# Patient Record
Sex: MALE | Race: WHITE | Employment: PART TIME | URBAN - METROPOLITAN AREA
[De-identification: names, ages, dates, MRNs, and addresses within clinical notes are randomized per-mention and may not be internally consistent; named-entity substitution may affect disease eponyms.]

---

## 2018-11-30 ENCOUNTER — HOSPITAL ENCOUNTER (OUTPATIENT)
Age: 68
Discharge: HOME | End: 2018-12-02
Payer: COMMERCIAL

## 2018-11-30 PROCEDURE — 88342 IMHCHEM/IMCYTCHM 1ST ANTB: CPT

## 2018-11-30 PROCEDURE — 88305 TISSUE EXAM BY PATHOLOGIST: CPT

## 2018-12-03 ENCOUNTER — HOSPITAL ENCOUNTER (OUTPATIENT)
Age: 68
Discharge: HOME | End: 2018-12-03
Payer: MEDICARE

## 2018-12-03 LAB
BUN BLDV-MCNC: 15 MG/DL (ref 8–23)
CREAT SERPL-MCNC: 0.8 MG/DL (ref 0.7–1.2)
GFR AFRICAN AMERICAN: >60
GFR NON-AFRICAN AMERICAN: >60 ML/MIN/1.73
MONO TEST: NEGATIVE
TSH SERPL DL<=0.05 MIU/L-ACNC: 1.05 UIU/ML (ref 0.27–4.2)

## 2018-12-03 PROCEDURE — 86308 HETEROPHILE ANTIBODY SCREEN: CPT

## 2018-12-03 PROCEDURE — 86618 LYME DISEASE ANTIBODY: CPT

## 2018-12-03 PROCEDURE — 82565 ASSAY OF CREATININE: CPT

## 2018-12-03 PROCEDURE — 36415 COLL VENOUS BLD VENIPUNCTURE: CPT

## 2018-12-03 PROCEDURE — 84520 ASSAY OF UREA NITROGEN: CPT

## 2018-12-03 PROCEDURE — 84443 ASSAY THYROID STIM HORMONE: CPT

## 2018-12-06 LAB — LYME, EIA: 2.32 LIV (ref 0–1.2)

## 2018-12-07 LAB
LYME (B. BURGDORFERI) AB IGG WB: POSITIVE
LYME AB IGM BY WB:: POSITIVE

## 2019-01-11 DIAGNOSIS — A69.20 LYME DISEASE: ICD-10-CM

## 2019-01-11 RX ORDER — HEPARIN SODIUM (PORCINE) LOCK FLUSH IV SOLN 100 UNIT/ML 100 UNIT/ML
300 SOLUTION INTRAVENOUS PRN
Status: CANCELLED | OUTPATIENT
Start: 2019-01-11

## 2019-01-11 RX ORDER — SODIUM CHLORIDE 0.9 % (FLUSH) 0.9 %
10 SYRINGE (ML) INJECTION PRN
Status: CANCELLED | OUTPATIENT
Start: 2019-01-11

## 2019-01-14 ENCOUNTER — HOSPITAL ENCOUNTER (OUTPATIENT)
Dept: INFUSION THERAPY | Age: 69
Setting detail: INFUSION SERIES
Discharge: HOME | End: 2019-01-14
Payer: MEDICARE

## 2019-01-14 VITALS
TEMPERATURE: 98.5 F | SYSTOLIC BLOOD PRESSURE: 144 MMHG | HEART RATE: 75 BPM | RESPIRATION RATE: 16 BRPM | DIASTOLIC BLOOD PRESSURE: 67 MMHG | OXYGEN SATURATION: 99 %

## 2019-01-14 DIAGNOSIS — A69.20 LYME DISEASE: ICD-10-CM

## 2019-01-14 LAB
ALBUMIN SERPL-MCNC: 3.2 G/DL (ref 3.5–5.2)
ALP BLD-CCNC: 88 U/L (ref 40–129)
ALT SERPL-CCNC: 11 U/L (ref 0–40)
ANION GAP SERPL CALCULATED.3IONS-SCNC: 10 MMOL/L (ref 7–16)
AST SERPL-CCNC: 28 U/L (ref 0–39)
BASOPHILS ABSOLUTE: 0 E9/L (ref 0–0.2)
BASOPHILS RELATIVE PERCENT: 0 % (ref 0–2)
BILIRUB SERPL-MCNC: 0.3 MG/DL (ref 0–1.2)
BUN BLDV-MCNC: 16 MG/DL (ref 8–23)
C-REACTIVE PROTEIN: 4.7 MG/DL (ref 0–0.4)
CALCIUM SERPL-MCNC: 9 MG/DL (ref 8.6–10.2)
CHLORIDE BLD-SCNC: 105 MMOL/L (ref 98–107)
CO2: 25 MMOL/L (ref 22–29)
CREAT SERPL-MCNC: 0.7 MG/DL (ref 0.7–1.2)
EOSINOPHILS ABSOLUTE: 0.05 E9/L (ref 0.05–0.5)
EOSINOPHILS RELATIVE PERCENT: 1.4 % (ref 0–6)
GFR AFRICAN AMERICAN: >60
GFR NON-AFRICAN AMERICAN: >60 ML/MIN/1.73
GLUCOSE BLD-MCNC: 99 MG/DL (ref 74–99)
HCT VFR BLD CALC: 28.6 % (ref 37–54)
HEMOGLOBIN: 9.3 G/DL (ref 12.5–16.5)
IMMATURE GRANULOCYTES #: 0.06 E9/L
IMMATURE GRANULOCYTES %: 1.7 % (ref 0–5)
LYMPHOCYTES ABSOLUTE: 1.16 E9/L (ref 1.5–4)
LYMPHOCYTES RELATIVE PERCENT: 33.4 % (ref 20–42)
MCH RBC QN AUTO: 28 PG (ref 26–35)
MCHC RBC AUTO-ENTMCNC: 32.5 % (ref 32–34.5)
MCV RBC AUTO: 86.1 FL (ref 80–99.9)
MONOCYTES ABSOLUTE: 0.15 E9/L (ref 0.1–0.95)
MONOCYTES RELATIVE PERCENT: 4.3 % (ref 2–12)
NEUTROPHILS ABSOLUTE: 2.05 E9/L (ref 1.8–7.3)
NEUTROPHILS RELATIVE PERCENT: 59.2 % (ref 43–80)
PDW BLD-RTO: 15.5 FL (ref 11.5–15)
PLATELET # BLD: 329 E9/L (ref 130–450)
PMV BLD AUTO: 10.3 FL (ref 7–12)
POTASSIUM SERPL-SCNC: 4.4 MMOL/L (ref 3.5–5)
RBC # BLD: 3.32 E12/L (ref 3.8–5.8)
SEDIMENTATION RATE, ERYTHROCYTE: 69 MM/HR (ref 0–15)
SODIUM BLD-SCNC: 140 MMOL/L (ref 132–146)
TOTAL PROTEIN: 6.7 G/DL (ref 6.4–8.3)
WBC # BLD: 3.5 E9/L (ref 4.5–11.5)

## 2019-01-14 PROCEDURE — 36415 COLL VENOUS BLD VENIPUNCTURE: CPT

## 2019-01-14 PROCEDURE — 85025 COMPLETE CBC W/AUTO DIFF WBC: CPT

## 2019-01-14 PROCEDURE — 76937 US GUIDE VASCULAR ACCESS: CPT

## 2019-01-14 PROCEDURE — C1751 CATH, INF, PER/CENT/MIDLINE: HCPCS

## 2019-01-14 PROCEDURE — 2580000003 HC RX 258: Performed by: SPECIALIST

## 2019-01-14 PROCEDURE — 6360000002 HC RX W HCPCS: Performed by: CLINICAL NURSE SPECIALIST

## 2019-01-14 PROCEDURE — 2500000003 HC RX 250 WO HCPCS: Performed by: SPECIALIST

## 2019-01-14 PROCEDURE — 2580000003 HC RX 258: Performed by: CLINICAL NURSE SPECIALIST

## 2019-01-14 PROCEDURE — 96365 THER/PROPH/DIAG IV INF INIT: CPT

## 2019-01-14 PROCEDURE — 80053 COMPREHEN METABOLIC PANEL: CPT

## 2019-01-14 PROCEDURE — 6360000002 HC RX W HCPCS: Performed by: SPECIALIST

## 2019-01-14 PROCEDURE — 36569 INSJ PICC 5 YR+ W/O IMAGING: CPT

## 2019-01-14 PROCEDURE — 86140 C-REACTIVE PROTEIN: CPT

## 2019-01-14 PROCEDURE — 85651 RBC SED RATE NONAUTOMATED: CPT

## 2019-01-14 RX ORDER — HEPARIN SODIUM (PORCINE) LOCK FLUSH IV SOLN 100 UNIT/ML 100 UNIT/ML
300 SOLUTION INTRAVENOUS PRN
Status: CANCELLED | OUTPATIENT
Start: 2019-01-14

## 2019-01-14 RX ORDER — HEPARIN SODIUM (PORCINE) LOCK FLUSH IV SOLN 100 UNIT/ML 100 UNIT/ML
3 SOLUTION INTRAVENOUS PRN
Status: DISCONTINUED | OUTPATIENT
Start: 2019-01-14 | End: 2019-01-15 | Stop reason: HOSPADM

## 2019-01-14 RX ORDER — SODIUM CHLORIDE 0.9 % (FLUSH) 0.9 %
10 SYRINGE (ML) INJECTION PRN
Status: CANCELLED | OUTPATIENT
Start: 2019-01-14

## 2019-01-14 RX ORDER — LIDOCAINE HYDROCHLORIDE 10 MG/ML
5 INJECTION, SOLUTION EPIDURAL; INFILTRATION; INTRACAUDAL; PERINEURAL ONCE
Status: COMPLETED | OUTPATIENT
Start: 2019-01-14 | End: 2019-01-14

## 2019-01-14 RX ORDER — HEPARIN SODIUM (PORCINE) LOCK FLUSH IV SOLN 100 UNIT/ML 100 UNIT/ML
300 SOLUTION INTRAVENOUS PRN
Status: DISCONTINUED | OUTPATIENT
Start: 2019-01-14 | End: 2019-01-15 | Stop reason: HOSPADM

## 2019-01-14 RX ORDER — LISINOPRIL AND HYDROCHLOROTHIAZIDE 20; 12.5 MG/1; MG/1
1 TABLET ORAL DAILY
COMMUNITY
End: 2021-10-26 | Stop reason: ALTCHOICE

## 2019-01-14 RX ORDER — ONDANSETRON 4 MG/1
4 TABLET, ORALLY DISINTEGRATING ORAL EVERY 8 HOURS PRN
COMMUNITY
End: 2021-10-26 | Stop reason: ALTCHOICE

## 2019-01-14 RX ORDER — SODIUM CHLORIDE 0.9 % (FLUSH) 0.9 %
10 SYRINGE (ML) INJECTION PRN
Status: DISCONTINUED | OUTPATIENT
Start: 2019-01-14 | End: 2019-01-15 | Stop reason: HOSPADM

## 2019-01-14 RX ORDER — HEPARIN SODIUM (PORCINE) LOCK FLUSH IV SOLN 100 UNIT/ML 100 UNIT/ML
3 SOLUTION INTRAVENOUS EVERY 12 HOURS SCHEDULED
Status: DISCONTINUED | OUTPATIENT
Start: 2019-01-14 | End: 2019-01-15 | Stop reason: HOSPADM

## 2019-01-14 RX ADMIN — Medication 10 ML: at 10:56

## 2019-01-14 RX ADMIN — LIDOCAINE HYDROCHLORIDE 2 ML: 10 INJECTION, SOLUTION EPIDURAL; INFILTRATION; INTRACAUDAL; PERINEURAL at 10:33

## 2019-01-14 RX ADMIN — Medication 10 ML: at 12:02

## 2019-01-14 RX ADMIN — CEFTRIAXONE SODIUM 2 G: 2 INJECTION, POWDER, FOR SOLUTION INTRAMUSCULAR; INTRAVENOUS at 11:30

## 2019-01-14 RX ADMIN — Medication 10 ML: at 12:01

## 2019-01-14 RX ADMIN — Medication 300 UNITS: at 12:03

## 2019-01-14 ASSESSMENT — PAIN DESCRIPTION - ORIENTATION: ORIENTATION: RIGHT;LEFT

## 2019-01-14 ASSESSMENT — PAIN DESCRIPTION - DESCRIPTORS: DESCRIPTORS: ACHING;DISCOMFORT

## 2019-01-14 ASSESSMENT — PAIN DESCRIPTION - PAIN TYPE: TYPE: CHRONIC PAIN

## 2019-01-14 ASSESSMENT — PAIN DESCRIPTION - LOCATION: LOCATION: GENERALIZED

## 2019-01-14 NOTE — PROCEDURES
PICC   Catheter insertion date 1/14/2019     Product Number:  [id number]   Lot No: [id number]   Gauge: 4FR   Lumen: SINGLE   R Basilic    Vein Diameter : 0.37CM   Upper arm circumference (10CM ABOVE AC): 25CM   Catheter Length : 46CM   Internal Length: 44CM   Exposed Catheter Length: 2CM   Ultrasound Used:YES  VPS Blue Bullseye confirms PICC tip is placed in the lower 1/3 of the SVC or at the Cavoatrial junction. Infusion Center nurse notified PICC  is okay to use.    : MARIMAR SYLVESTER RN

## 2019-01-15 ENCOUNTER — HOSPITAL ENCOUNTER (OUTPATIENT)
Dept: INFUSION THERAPY | Age: 69
Setting detail: INFUSION SERIES
Discharge: HOME | End: 2019-01-15
Payer: MEDICARE

## 2019-01-15 VITALS
TEMPERATURE: 98 F | DIASTOLIC BLOOD PRESSURE: 56 MMHG | RESPIRATION RATE: 16 BRPM | HEART RATE: 88 BPM | SYSTOLIC BLOOD PRESSURE: 117 MMHG | HEIGHT: 70 IN | OXYGEN SATURATION: 98 % | WEIGHT: 145 LBS | BODY MASS INDEX: 20.76 KG/M2

## 2019-01-15 DIAGNOSIS — A69.20 LYME DISEASE: ICD-10-CM

## 2019-01-15 PROCEDURE — 6360000002 HC RX W HCPCS: Performed by: CLINICAL NURSE SPECIALIST

## 2019-01-15 PROCEDURE — 2580000003 HC RX 258: Performed by: CLINICAL NURSE SPECIALIST

## 2019-01-15 PROCEDURE — 96365 THER/PROPH/DIAG IV INF INIT: CPT

## 2019-01-15 RX ORDER — HEPARIN SODIUM (PORCINE) LOCK FLUSH IV SOLN 100 UNIT/ML 100 UNIT/ML
300 SOLUTION INTRAVENOUS PRN
Status: DISCONTINUED | OUTPATIENT
Start: 2019-01-15 | End: 2019-01-16 | Stop reason: HOSPADM

## 2019-01-15 RX ORDER — SODIUM CHLORIDE 0.9 % (FLUSH) 0.9 %
10 SYRINGE (ML) INJECTION PRN
Status: DISCONTINUED | OUTPATIENT
Start: 2019-01-15 | End: 2019-01-16 | Stop reason: HOSPADM

## 2019-01-15 RX ORDER — SODIUM CHLORIDE 0.9 % (FLUSH) 0.9 %
10 SYRINGE (ML) INJECTION PRN
Status: CANCELLED | OUTPATIENT
Start: 2019-01-15

## 2019-01-15 RX ORDER — HEPARIN SODIUM (PORCINE) LOCK FLUSH IV SOLN 100 UNIT/ML 100 UNIT/ML
300 SOLUTION INTRAVENOUS PRN
Status: CANCELLED | OUTPATIENT
Start: 2019-01-15

## 2019-01-15 RX ADMIN — Medication 300 UNITS: at 14:41

## 2019-01-15 RX ADMIN — Medication 10 ML: at 14:40

## 2019-01-15 RX ADMIN — DEXTROSE 2 G: 50 INJECTION, SOLUTION INTRAVENOUS at 14:09

## 2019-01-15 RX ADMIN — Medication 10 ML: at 14:41

## 2019-01-15 RX ADMIN — Medication 10 ML: at 14:08

## 2019-01-15 NOTE — PROGRESS NOTES
Tolerated infusion well. Therapy plan reviewed with patient. Verbalizes understanding. Reviewed AVS with patient, reviewed medication information, verbalizes good knowledge of current plan, and has no signs or symptoms to report at this time. Declines copy of AVS. Patient instructed on s/s infection/complication with PICC line to report and instructed on keeping PICC dressing clean, dry and intact patient verbalizes understanding. Next appointment scheduled.

## 2019-01-16 ENCOUNTER — HOSPITAL ENCOUNTER (OUTPATIENT)
Dept: INFUSION THERAPY | Age: 69
Setting detail: INFUSION SERIES
Discharge: HOME | End: 2019-01-16
Payer: MEDICARE

## 2019-01-16 VITALS
WEIGHT: 145 LBS | OXYGEN SATURATION: 97 % | RESPIRATION RATE: 16 BRPM | DIASTOLIC BLOOD PRESSURE: 59 MMHG | TEMPERATURE: 98.5 F | HEIGHT: 70 IN | HEART RATE: 83 BPM | BODY MASS INDEX: 20.76 KG/M2 | SYSTOLIC BLOOD PRESSURE: 115 MMHG

## 2019-01-16 DIAGNOSIS — A69.20 LYME DISEASE: ICD-10-CM

## 2019-01-16 PROCEDURE — 96365 THER/PROPH/DIAG IV INF INIT: CPT

## 2019-01-16 PROCEDURE — 6360000002 HC RX W HCPCS: Performed by: CLINICAL NURSE SPECIALIST

## 2019-01-16 PROCEDURE — 2580000003 HC RX 258: Performed by: CLINICAL NURSE SPECIALIST

## 2019-01-16 RX ORDER — HEPARIN SODIUM (PORCINE) LOCK FLUSH IV SOLN 100 UNIT/ML 100 UNIT/ML
300 SOLUTION INTRAVENOUS PRN
Status: DISCONTINUED | OUTPATIENT
Start: 2019-01-16 | End: 2019-01-17 | Stop reason: HOSPADM

## 2019-01-16 RX ORDER — HEPARIN SODIUM (PORCINE) LOCK FLUSH IV SOLN 100 UNIT/ML 100 UNIT/ML
300 SOLUTION INTRAVENOUS PRN
Status: CANCELLED | OUTPATIENT
Start: 2019-01-16

## 2019-01-16 RX ORDER — SODIUM CHLORIDE 0.9 % (FLUSH) 0.9 %
10 SYRINGE (ML) INJECTION PRN
Status: CANCELLED | OUTPATIENT
Start: 2019-01-16

## 2019-01-16 RX ORDER — SODIUM CHLORIDE 0.9 % (FLUSH) 0.9 %
10 SYRINGE (ML) INJECTION PRN
Status: DISCONTINUED | OUTPATIENT
Start: 2019-01-16 | End: 2019-01-17 | Stop reason: HOSPADM

## 2019-01-16 RX ADMIN — Medication 10 ML: at 14:26

## 2019-01-16 RX ADMIN — Medication 10 ML: at 13:53

## 2019-01-16 RX ADMIN — Medication 10 ML: at 14:25

## 2019-01-16 RX ADMIN — Medication 10 ML: at 13:50

## 2019-01-16 RX ADMIN — Medication 300 UNITS: at 14:26

## 2019-01-16 RX ADMIN — CEFTRIAXONE SODIUM 2 G: 2 INJECTION, POWDER, FOR SOLUTION INTRAMUSCULAR; INTRAVENOUS at 13:53

## 2019-01-17 ENCOUNTER — HOSPITAL ENCOUNTER (OUTPATIENT)
Dept: INFUSION THERAPY | Age: 69
Setting detail: INFUSION SERIES
Discharge: HOME | End: 2019-01-17
Payer: MEDICARE

## 2019-01-17 VITALS
HEART RATE: 76 BPM | OXYGEN SATURATION: 99 % | RESPIRATION RATE: 16 BRPM | TEMPERATURE: 97.5 F | HEIGHT: 70 IN | WEIGHT: 145 LBS | BODY MASS INDEX: 20.76 KG/M2 | DIASTOLIC BLOOD PRESSURE: 58 MMHG | SYSTOLIC BLOOD PRESSURE: 122 MMHG

## 2019-01-17 DIAGNOSIS — A69.20 LYME DISEASE: ICD-10-CM

## 2019-01-17 LAB
ALBUMIN SERPL-MCNC: 3.1 G/DL (ref 3.5–5.2)
ALP BLD-CCNC: 89 U/L (ref 40–129)
ALT SERPL-CCNC: 13 U/L (ref 0–40)
ANION GAP SERPL CALCULATED.3IONS-SCNC: 9 MMOL/L (ref 7–16)
AST SERPL-CCNC: 23 U/L (ref 0–39)
BASOPHILS ABSOLUTE: 0.01 E9/L (ref 0–0.2)
BASOPHILS RELATIVE PERCENT: 0.2 % (ref 0–2)
BILIRUB SERPL-MCNC: 0.2 MG/DL (ref 0–1.2)
BUN BLDV-MCNC: 16 MG/DL (ref 8–23)
CALCIUM SERPL-MCNC: 8.8 MG/DL (ref 8.6–10.2)
CHLORIDE BLD-SCNC: 102 MMOL/L (ref 98–107)
CO2: 26 MMOL/L (ref 22–29)
CREAT SERPL-MCNC: 0.8 MG/DL (ref 0.7–1.2)
EOSINOPHILS ABSOLUTE: 0.09 E9/L (ref 0.05–0.5)
EOSINOPHILS RELATIVE PERCENT: 2 % (ref 0–6)
GFR AFRICAN AMERICAN: >60
GFR NON-AFRICAN AMERICAN: >60 ML/MIN/1.73
GLUCOSE BLD-MCNC: 119 MG/DL (ref 74–99)
HCT VFR BLD CALC: 27.2 % (ref 37–54)
HEMOGLOBIN: 8.8 G/DL (ref 12.5–16.5)
IMMATURE GRANULOCYTES #: 0.06 E9/L
IMMATURE GRANULOCYTES %: 1.4 % (ref 0–5)
LYMPHOCYTES ABSOLUTE: 1.13 E9/L (ref 1.5–4)
LYMPHOCYTES RELATIVE PERCENT: 25.6 % (ref 20–42)
MCH RBC QN AUTO: 28 PG (ref 26–35)
MCHC RBC AUTO-ENTMCNC: 32.4 % (ref 32–34.5)
MCV RBC AUTO: 86.6 FL (ref 80–99.9)
MONOCYTES ABSOLUTE: 0.22 E9/L (ref 0.1–0.95)
MONOCYTES RELATIVE PERCENT: 5 % (ref 2–12)
NEUTROPHILS ABSOLUTE: 2.91 E9/L (ref 1.8–7.3)
NEUTROPHILS RELATIVE PERCENT: 65.8 % (ref 43–80)
PDW BLD-RTO: 15.9 FL (ref 11.5–15)
PLATELET # BLD: 234 E9/L (ref 130–450)
PMV BLD AUTO: 10.5 FL (ref 7–12)
POTASSIUM SERPL-SCNC: 4.3 MMOL/L (ref 3.5–5)
RBC # BLD: 3.14 E12/L (ref 3.8–5.8)
SODIUM BLD-SCNC: 137 MMOL/L (ref 132–146)
TOTAL PROTEIN: 6.2 G/DL (ref 6.4–8.3)
WBC # BLD: 4.4 E9/L (ref 4.5–11.5)

## 2019-01-17 PROCEDURE — 2580000003 HC RX 258: Performed by: CLINICAL NURSE SPECIALIST

## 2019-01-17 PROCEDURE — 96365 THER/PROPH/DIAG IV INF INIT: CPT

## 2019-01-17 PROCEDURE — 85025 COMPLETE CBC W/AUTO DIFF WBC: CPT

## 2019-01-17 PROCEDURE — 80053 COMPREHEN METABOLIC PANEL: CPT

## 2019-01-17 PROCEDURE — 6360000002 HC RX W HCPCS: Performed by: CLINICAL NURSE SPECIALIST

## 2019-01-17 PROCEDURE — 36415 COLL VENOUS BLD VENIPUNCTURE: CPT

## 2019-01-17 RX ORDER — SODIUM CHLORIDE 0.9 % (FLUSH) 0.9 %
10 SYRINGE (ML) INJECTION PRN
Status: DISCONTINUED | OUTPATIENT
Start: 2019-01-17 | End: 2019-01-18 | Stop reason: HOSPADM

## 2019-01-17 RX ORDER — HEPARIN SODIUM (PORCINE) LOCK FLUSH IV SOLN 100 UNIT/ML 100 UNIT/ML
300 SOLUTION INTRAVENOUS PRN
Status: CANCELLED | OUTPATIENT
Start: 2019-01-17

## 2019-01-17 RX ORDER — SODIUM CHLORIDE 0.9 % (FLUSH) 0.9 %
10 SYRINGE (ML) INJECTION PRN
Status: CANCELLED | OUTPATIENT
Start: 2019-01-17

## 2019-01-17 RX ORDER — HEPARIN SODIUM (PORCINE) LOCK FLUSH IV SOLN 100 UNIT/ML 100 UNIT/ML
300 SOLUTION INTRAVENOUS PRN
Status: DISCONTINUED | OUTPATIENT
Start: 2019-01-17 | End: 2019-01-18 | Stop reason: HOSPADM

## 2019-01-17 RX ADMIN — Medication 10 ML: at 13:54

## 2019-01-17 RX ADMIN — CEFTRIAXONE SODIUM 2 G: 2 INJECTION, POWDER, FOR SOLUTION INTRAMUSCULAR; INTRAVENOUS at 14:23

## 2019-01-17 RX ADMIN — Medication 10 ML: at 14:54

## 2019-01-17 RX ADMIN — Medication 10 ML: at 14:53

## 2019-01-17 RX ADMIN — Medication 300 UNITS: at 14:54

## 2019-01-17 RX ADMIN — Medication 10 ML: at 13:56

## 2019-01-18 ENCOUNTER — HOSPITAL ENCOUNTER (EMERGENCY)
Age: 69
Discharge: HOME | End: 2019-01-18
Attending: EMERGENCY MEDICINE
Payer: MEDICARE

## 2019-01-18 ENCOUNTER — HOSPITAL ENCOUNTER (OUTPATIENT)
Dept: GENERAL RADIOLOGY | Age: 69
Discharge: HOME | End: 2019-01-20
Payer: MEDICARE

## 2019-01-18 ENCOUNTER — HOSPITAL ENCOUNTER (OUTPATIENT)
Age: 69
Discharge: HOME | End: 2019-01-20
Payer: MEDICARE

## 2019-01-18 ENCOUNTER — HOSPITAL ENCOUNTER (OUTPATIENT)
Dept: INFUSION THERAPY | Age: 69
Setting detail: INFUSION SERIES
Discharge: HOME | End: 2019-01-18
Payer: MEDICARE

## 2019-01-18 ENCOUNTER — HOSPITAL ENCOUNTER (OUTPATIENT)
Dept: ULTRASOUND IMAGING | Age: 69
Discharge: HOME | End: 2019-01-20
Payer: MEDICARE

## 2019-01-18 VITALS
SYSTOLIC BLOOD PRESSURE: 121 MMHG | HEART RATE: 97 BPM | HEIGHT: 70 IN | TEMPERATURE: 99 F | RESPIRATION RATE: 16 BRPM | DIASTOLIC BLOOD PRESSURE: 76 MMHG | BODY MASS INDEX: 20.62 KG/M2 | OXYGEN SATURATION: 97 % | WEIGHT: 144 LBS

## 2019-01-18 VITALS
OXYGEN SATURATION: 98 % | HEART RATE: 91 BPM | HEIGHT: 70 IN | BODY MASS INDEX: 20.76 KG/M2 | RESPIRATION RATE: 16 BRPM | DIASTOLIC BLOOD PRESSURE: 54 MMHG | SYSTOLIC BLOOD PRESSURE: 99 MMHG | WEIGHT: 145 LBS | TEMPERATURE: 98.1 F

## 2019-01-18 DIAGNOSIS — A69.20 LYME DISEASE: ICD-10-CM

## 2019-01-18 DIAGNOSIS — Z78.9 PROBLEM WITH VASCULAR ACCESS: Primary | ICD-10-CM

## 2019-01-18 DIAGNOSIS — R52 PAIN: ICD-10-CM

## 2019-01-18 DIAGNOSIS — Z45.2 PICC (PERIPHERALLY INSERTED CENTRAL CATHETER) IN PLACE: ICD-10-CM

## 2019-01-18 PROCEDURE — 2580000003 HC RX 258: Performed by: CLINICAL NURSE SPECIALIST

## 2019-01-18 PROCEDURE — 71045 X-RAY EXAM CHEST 1 VIEW: CPT

## 2019-01-18 PROCEDURE — 93971 EXTREMITY STUDY: CPT

## 2019-01-18 PROCEDURE — 6360000002 HC RX W HCPCS: Performed by: CLINICAL NURSE SPECIALIST

## 2019-01-18 PROCEDURE — 96365 THER/PROPH/DIAG IV INF INIT: CPT

## 2019-01-18 PROCEDURE — 99282 EMERGENCY DEPT VISIT SF MDM: CPT

## 2019-01-18 RX ORDER — SODIUM CHLORIDE 0.9 % (FLUSH) 0.9 %
10 SYRINGE (ML) INJECTION PRN
Status: DISCONTINUED | OUTPATIENT
Start: 2019-01-18 | End: 2019-01-19 | Stop reason: HOSPADM

## 2019-01-18 RX ORDER — HEPARIN SODIUM (PORCINE) LOCK FLUSH IV SOLN 100 UNIT/ML 100 UNIT/ML
300 SOLUTION INTRAVENOUS PRN
Status: CANCELLED | OUTPATIENT
Start: 2019-01-18

## 2019-01-18 RX ORDER — SODIUM CHLORIDE 0.9 % (FLUSH) 0.9 %
10 SYRINGE (ML) INJECTION PRN
Status: CANCELLED | OUTPATIENT
Start: 2019-01-18

## 2019-01-18 RX ORDER — HEPARIN SODIUM (PORCINE) LOCK FLUSH IV SOLN 100 UNIT/ML 100 UNIT/ML
300 SOLUTION INTRAVENOUS PRN
Status: DISCONTINUED | OUTPATIENT
Start: 2019-01-18 | End: 2019-01-19 | Stop reason: HOSPADM

## 2019-01-18 RX ADMIN — Medication 10 ML: at 14:42

## 2019-01-18 RX ADMIN — CEFTRIAXONE SODIUM 2 G: 2 INJECTION, POWDER, FOR SOLUTION INTRAMUSCULAR; INTRAVENOUS at 14:10

## 2019-01-18 RX ADMIN — Medication 300 UNITS: at 14:43

## 2019-01-18 RX ADMIN — Medication 10 ML: at 14:09

## 2019-01-18 RX ADMIN — Medication 10 ML: at 14:43

## 2019-01-18 ASSESSMENT — PAIN SCALES - GENERAL: PAINLEVEL_OUTOF10: 0

## 2019-01-18 NOTE — PROGRESS NOTES
Spoke with Staci Bowden at Dr. Crock[facility] office notified her right arm, PICC arm ,the upper arm no swelling same measurement as it was on insertion but his lower arm is swelled and his PICC dressing has a serous drainage on it and the biopatch was wet , there is no active leaking noted when we are infusion the IV, she sent script for US and Xray and patient was taken over after his appointment here.

## 2019-01-18 NOTE — PROGRESS NOTES
Called to assess this patients PICC line. Patient and nurses state that the dressing is wet as well as the patients sleeve. The drainage is serous in color. Biopatch and dressing are obviously wet. The dressing was changed yesterday for the same reason. Right arm circ remains 25 cm at 10 cm above the [facility] although the patients rt hand appears to be swollen. Infusion center staff agreed to notify Dr Essence Gould of these findings.

## 2019-01-18 NOTE — PROGRESS NOTES
Tolerated infusion well. Therapy plan reviewed with patient. Verbalizes understanding. Reviewed AVS with patient, reviewed medication information, verbalizes good knowledge of current plan, and has no signs or symptoms to report at this time. Declines copy of AVS. Patient instructed on s/s infection/complication with PICC line to report and instructed on keeping PICC dressing clean, dry and intact patient verbalizes understanding. Next appointment scheduled. Pt sent over to have ultrasound done of right arm due to site leaking.

## 2019-01-19 ENCOUNTER — HOSPITAL ENCOUNTER (OUTPATIENT)
Dept: INFUSION THERAPY | Age: 69
Setting detail: INFUSION SERIES
Discharge: HOME | End: 2019-01-19
Payer: MEDICARE

## 2019-01-19 VITALS
HEART RATE: 100 BPM | DIASTOLIC BLOOD PRESSURE: 59 MMHG | TEMPERATURE: 97.8 F | SYSTOLIC BLOOD PRESSURE: 125 MMHG | OXYGEN SATURATION: 98 % | RESPIRATION RATE: 16 BRPM

## 2019-01-19 DIAGNOSIS — A69.20 LYME DISEASE: ICD-10-CM

## 2019-01-19 PROCEDURE — 6360000002 HC RX W HCPCS: Performed by: CLINICAL NURSE SPECIALIST

## 2019-01-19 PROCEDURE — 2580000003 HC RX 258: Performed by: CLINICAL NURSE SPECIALIST

## 2019-01-19 PROCEDURE — 96365 THER/PROPH/DIAG IV INF INIT: CPT

## 2019-01-19 RX ORDER — SODIUM CHLORIDE 0.9 % (FLUSH) 0.9 %
10 SYRINGE (ML) INJECTION PRN
Status: CANCELLED | OUTPATIENT
Start: 2019-01-19

## 2019-01-19 RX ORDER — HEPARIN SODIUM (PORCINE) LOCK FLUSH IV SOLN 100 UNIT/ML 100 UNIT/ML
300 SOLUTION INTRAVENOUS PRN
Status: DISCONTINUED | OUTPATIENT
Start: 2019-01-19 | End: 2019-01-20 | Stop reason: HOSPADM

## 2019-01-19 RX ORDER — SODIUM CHLORIDE 0.9 % (FLUSH) 0.9 %
10 SYRINGE (ML) INJECTION PRN
Status: DISCONTINUED | OUTPATIENT
Start: 2019-01-19 | End: 2019-01-20 | Stop reason: HOSPADM

## 2019-01-19 RX ORDER — HEPARIN SODIUM (PORCINE) LOCK FLUSH IV SOLN 100 UNIT/ML 100 UNIT/ML
300 SOLUTION INTRAVENOUS PRN
Status: CANCELLED | OUTPATIENT
Start: 2019-01-19

## 2019-01-19 RX ADMIN — Medication 10 ML: at 10:10

## 2019-01-19 RX ADMIN — Medication 10 ML: at 10:11

## 2019-01-19 RX ADMIN — Medication 300 UNITS: at 10:12

## 2019-01-19 RX ADMIN — Medication 10 ML: at 09:34

## 2019-01-19 RX ADMIN — CEFTRIAXONE SODIUM 2 G: 2 INJECTION, POWDER, FOR SOLUTION INTRAMUSCULAR; INTRAVENOUS at 09:38

## 2019-01-19 ASSESSMENT — PAIN SCALES - GENERAL: PAINLEVEL_OUTOF10: 0

## 2019-01-19 NOTE — PROGRESS NOTES
Tolerated infusion well. Therapy plan reviewed with patient. Verbalizes understanding. Reviewed AVS with patient, reviewed medication information, verbalizes good knowledge of current plan, and has no signs or symptoms to report at this time. Declines copy of AVS. Patient instructed on s/s infection/complication with PICC line to report and instructed on keeping PICC dressing clean, dry and intact patient verbalizes understanding. PICC line dressing wet dressing changed.

## 2019-01-20 ENCOUNTER — HOSPITAL ENCOUNTER (OUTPATIENT)
Dept: INFUSION THERAPY | Age: 69
Setting detail: INFUSION SERIES
Discharge: HOME | End: 2019-01-20
Payer: MEDICARE

## 2019-01-20 ASSESSMENT — ENCOUNTER SYMPTOMS
BACK PAIN: 0
SHORTNESS OF BREATH: 0
ABDOMINAL PAIN: 0
VOMITING: 0
BLOOD IN STOOL: 0
CONSTIPATION: 0
WHEEZING: 0
COUGH: 0
NAUSEA: 0
DIARRHEA: 0

## 2019-01-21 ENCOUNTER — HOSPITAL ENCOUNTER (OUTPATIENT)
Dept: INFUSION THERAPY | Age: 69
Setting detail: INFUSION SERIES
Discharge: HOME | End: 2019-01-21
Payer: MEDICARE

## 2019-01-21 VITALS
WEIGHT: 144 LBS | HEART RATE: 79 BPM | OXYGEN SATURATION: 100 % | DIASTOLIC BLOOD PRESSURE: 62 MMHG | TEMPERATURE: 98.6 F | BODY MASS INDEX: 20.62 KG/M2 | HEIGHT: 70 IN | RESPIRATION RATE: 16 BRPM | SYSTOLIC BLOOD PRESSURE: 136 MMHG

## 2019-01-21 DIAGNOSIS — A69.20 LYME DISEASE: ICD-10-CM

## 2019-01-21 LAB
ALBUMIN SERPL-MCNC: 3.1 G/DL (ref 3.5–5.2)
ALP BLD-CCNC: 83 U/L (ref 40–129)
ALT SERPL-CCNC: 15 U/L (ref 0–40)
ANION GAP SERPL CALCULATED.3IONS-SCNC: 10 MMOL/L (ref 7–16)
AST SERPL-CCNC: 25 U/L (ref 0–39)
BASOPHILS ABSOLUTE: 0 E9/L (ref 0–0.2)
BASOPHILS RELATIVE PERCENT: 0 % (ref 0–2)
BILIRUB SERPL-MCNC: 0.3 MG/DL (ref 0–1.2)
BUN BLDV-MCNC: 14 MG/DL (ref 8–23)
C-REACTIVE PROTEIN: 2.9 MG/DL (ref 0–0.4)
CALCIUM SERPL-MCNC: 8.7 MG/DL (ref 8.6–10.2)
CHLORIDE BLD-SCNC: 104 MMOL/L (ref 98–107)
CO2: 24 MMOL/L (ref 22–29)
CREAT SERPL-MCNC: 0.7 MG/DL (ref 0.7–1.2)
EOSINOPHILS ABSOLUTE: 0.06 E9/L (ref 0.05–0.5)
EOSINOPHILS RELATIVE PERCENT: 1.7 % (ref 0–6)
GFR AFRICAN AMERICAN: >60
GFR NON-AFRICAN AMERICAN: >60 ML/MIN/1.73
GLUCOSE BLD-MCNC: 101 MG/DL (ref 74–99)
HCT VFR BLD CALC: 28.8 % (ref 37–54)
HEMOGLOBIN: 9.1 G/DL (ref 12.5–16.5)
IMMATURE GRANULOCYTES #: 0.05 E9/L
IMMATURE GRANULOCYTES %: 1.4 % (ref 0–5)
LYMPHOCYTES ABSOLUTE: 1.23 E9/L (ref 1.5–4)
LYMPHOCYTES RELATIVE PERCENT: 34.7 % (ref 20–42)
MCH RBC QN AUTO: 27.7 PG (ref 26–35)
MCHC RBC AUTO-ENTMCNC: 31.6 % (ref 32–34.5)
MCV RBC AUTO: 87.5 FL (ref 80–99.9)
MONOCYTES ABSOLUTE: 0.18 E9/L (ref 0.1–0.95)
MONOCYTES RELATIVE PERCENT: 5.1 % (ref 2–12)
NEUTROPHILS ABSOLUTE: 2.02 E9/L (ref 1.8–7.3)
NEUTROPHILS RELATIVE PERCENT: 57.1 % (ref 43–80)
PDW BLD-RTO: 16 FL (ref 11.5–15)
PLATELET # BLD: 249 E9/L (ref 130–450)
PMV BLD AUTO: 10.3 FL (ref 7–12)
POTASSIUM SERPL-SCNC: 4.1 MMOL/L (ref 3.5–5)
RBC # BLD: 3.29 E12/L (ref 3.8–5.8)
SEDIMENTATION RATE, ERYTHROCYTE: 68 MM/HR (ref 0–15)
SODIUM BLD-SCNC: 138 MMOL/L (ref 132–146)
TOTAL PROTEIN: 6.7 G/DL (ref 6.4–8.3)
WBC # BLD: 3.5 E9/L (ref 4.5–11.5)

## 2019-01-21 PROCEDURE — C1751 CATH, INF, PER/CENT/MIDLINE: HCPCS

## 2019-01-21 PROCEDURE — 6360000002 HC RX W HCPCS: Performed by: SPECIALIST

## 2019-01-21 PROCEDURE — 2500000003 HC RX 250 WO HCPCS: Performed by: SPECIALIST

## 2019-01-21 PROCEDURE — 96365 THER/PROPH/DIAG IV INF INIT: CPT

## 2019-01-21 PROCEDURE — 2580000003 HC RX 258: Performed by: SPECIALIST

## 2019-01-21 PROCEDURE — 85651 RBC SED RATE NONAUTOMATED: CPT

## 2019-01-21 PROCEDURE — 36569 INSJ PICC 5 YR+ W/O IMAGING: CPT

## 2019-01-21 PROCEDURE — 6360000002 HC RX W HCPCS: Performed by: CLINICAL NURSE SPECIALIST

## 2019-01-21 PROCEDURE — 86140 C-REACTIVE PROTEIN: CPT

## 2019-01-21 PROCEDURE — 80053 COMPREHEN METABOLIC PANEL: CPT

## 2019-01-21 PROCEDURE — 2580000003 HC RX 258: Performed by: CLINICAL NURSE SPECIALIST

## 2019-01-21 PROCEDURE — 85025 COMPLETE CBC W/AUTO DIFF WBC: CPT

## 2019-01-21 PROCEDURE — 36415 COLL VENOUS BLD VENIPUNCTURE: CPT

## 2019-01-21 PROCEDURE — 76937 US GUIDE VASCULAR ACCESS: CPT

## 2019-01-21 RX ORDER — SODIUM CHLORIDE 0.9 % (FLUSH) 0.9 %
10 SYRINGE (ML) INJECTION PRN
Status: CANCELLED | OUTPATIENT
Start: 2019-01-21

## 2019-01-21 RX ORDER — SODIUM CHLORIDE 0.9 % (FLUSH) 0.9 %
10 SYRINGE (ML) INJECTION PRN
Status: DISCONTINUED | OUTPATIENT
Start: 2019-01-21 | End: 2019-01-22 | Stop reason: HOSPADM

## 2019-01-21 RX ORDER — LIDOCAINE HYDROCHLORIDE 10 MG/ML
5 INJECTION, SOLUTION EPIDURAL; INFILTRATION; INTRACAUDAL; PERINEURAL ONCE
Status: COMPLETED | OUTPATIENT
Start: 2019-01-21 | End: 2019-01-21

## 2019-01-21 RX ORDER — HEPARIN SODIUM (PORCINE) LOCK FLUSH IV SOLN 100 UNIT/ML 100 UNIT/ML
3 SOLUTION INTRAVENOUS PRN
Status: DISCONTINUED | OUTPATIENT
Start: 2019-01-21 | End: 2019-01-22 | Stop reason: HOSPADM

## 2019-01-21 RX ORDER — HEPARIN SODIUM (PORCINE) LOCK FLUSH IV SOLN 100 UNIT/ML 100 UNIT/ML
300 SOLUTION INTRAVENOUS PRN
Status: DISCONTINUED | OUTPATIENT
Start: 2019-01-21 | End: 2019-01-22 | Stop reason: HOSPADM

## 2019-01-21 RX ORDER — HEPARIN SODIUM (PORCINE) LOCK FLUSH IV SOLN 100 UNIT/ML 100 UNIT/ML
300 SOLUTION INTRAVENOUS PRN
Status: CANCELLED | OUTPATIENT
Start: 2019-01-21

## 2019-01-21 RX ADMIN — Medication 10 ML: at 11:53

## 2019-01-21 RX ADMIN — Medication 10 ML: at 12:37

## 2019-01-21 RX ADMIN — CEFTRIAXONE SODIUM 2 G: 2 INJECTION, POWDER, FOR SOLUTION INTRAMUSCULAR; INTRAVENOUS at 12:07

## 2019-01-21 RX ADMIN — LIDOCAINE HYDROCHLORIDE 1.5 ML: 10 INJECTION, SOLUTION EPIDURAL; INFILTRATION; INTRACAUDAL; PERINEURAL at 13:40

## 2019-01-21 RX ADMIN — Medication 10 ML: at 12:36

## 2019-01-21 RX ADMIN — Medication 300 UNITS: at 14:32

## 2019-01-21 NOTE — PROCEDURES
PICC   Catheter insertion date 1/21/2019     Product Number:  [id number]   Lot No: [id number]   Gauge: 4 fr   Lumen: single   Lt Brachial    Vein Diameter : 0.45 cm   Upper arm circumference (10CM ABOVE AC): 25 cm   Catheter Length : 38 cm(12 cm cut from a 50 cm line)   Internal Length: 37 cm   Exposed Catheter Length: 1 cm   Ultrasound Used:yes  VPS Blue Bullseye confirms PICC tip is placed in the lower 1/3 of the SVC or at the Cavoatrial junction. Floor nurse notified PICC is okay to use.    : Harry Bunch RN

## 2019-01-21 NOTE — PROGRESS NOTES
Pt returned from getting his PICC line replaced. Dressing dry and intact. Bulls eye obtained, flushed per protocol.

## 2019-01-22 ENCOUNTER — HOSPITAL ENCOUNTER (OUTPATIENT)
Dept: INFUSION THERAPY | Age: 69
Setting detail: INFUSION SERIES
Discharge: HOME | End: 2019-01-22
Payer: MEDICARE

## 2019-01-22 VITALS
OXYGEN SATURATION: 100 % | HEART RATE: 85 BPM | DIASTOLIC BLOOD PRESSURE: 67 MMHG | RESPIRATION RATE: 16 BRPM | HEIGHT: 70 IN | WEIGHT: 144 LBS | BODY MASS INDEX: 20.62 KG/M2 | SYSTOLIC BLOOD PRESSURE: 153 MMHG | TEMPERATURE: 97.5 F

## 2019-01-22 DIAGNOSIS — A69.20 LYME DISEASE: ICD-10-CM

## 2019-01-22 PROCEDURE — 6360000002 HC RX W HCPCS: Performed by: CLINICAL NURSE SPECIALIST

## 2019-01-22 PROCEDURE — 2580000003 HC RX 258: Performed by: CLINICAL NURSE SPECIALIST

## 2019-01-22 PROCEDURE — 96365 THER/PROPH/DIAG IV INF INIT: CPT

## 2019-01-22 RX ORDER — SODIUM CHLORIDE 0.9 % (FLUSH) 0.9 %
10 SYRINGE (ML) INJECTION PRN
Status: CANCELLED | OUTPATIENT
Start: 2019-01-22

## 2019-01-22 RX ORDER — HEPARIN SODIUM (PORCINE) LOCK FLUSH IV SOLN 100 UNIT/ML 100 UNIT/ML
300 SOLUTION INTRAVENOUS PRN
Status: DISCONTINUED | OUTPATIENT
Start: 2019-01-22 | End: 2019-01-23 | Stop reason: HOSPADM

## 2019-01-22 RX ORDER — SODIUM CHLORIDE 0.9 % (FLUSH) 0.9 %
10 SYRINGE (ML) INJECTION PRN
Status: DISCONTINUED | OUTPATIENT
Start: 2019-01-22 | End: 2019-01-23 | Stop reason: HOSPADM

## 2019-01-22 RX ORDER — HEPARIN SODIUM (PORCINE) LOCK FLUSH IV SOLN 100 UNIT/ML 100 UNIT/ML
300 SOLUTION INTRAVENOUS PRN
Status: CANCELLED | OUTPATIENT
Start: 2019-01-22

## 2019-01-22 RX ADMIN — Medication 10 ML: at 14:15

## 2019-01-22 RX ADMIN — Medication 10 ML: at 14:16

## 2019-01-22 RX ADMIN — CEFTRIAXONE SODIUM 2 G: 2 INJECTION, POWDER, FOR SOLUTION INTRAMUSCULAR; INTRAVENOUS at 13:44

## 2019-01-22 RX ADMIN — Medication 10 ML: at 13:43

## 2019-01-22 RX ADMIN — Medication 300 UNITS: at 14:16

## 2019-01-23 ENCOUNTER — HOSPITAL ENCOUNTER (OUTPATIENT)
Dept: INFUSION THERAPY | Age: 69
Setting detail: INFUSION SERIES
Discharge: HOME | End: 2019-01-23
Payer: MEDICARE

## 2019-01-23 VITALS
HEART RATE: 83 BPM | WEIGHT: 144 LBS | TEMPERATURE: 98.8 F | HEIGHT: 70 IN | DIASTOLIC BLOOD PRESSURE: 60 MMHG | BODY MASS INDEX: 20.62 KG/M2 | SYSTOLIC BLOOD PRESSURE: 121 MMHG | RESPIRATION RATE: 16 BRPM | OXYGEN SATURATION: 97 %

## 2019-01-23 DIAGNOSIS — A69.20 LYME DISEASE: ICD-10-CM

## 2019-01-23 PROCEDURE — 96365 THER/PROPH/DIAG IV INF INIT: CPT

## 2019-01-23 PROCEDURE — 6360000002 HC RX W HCPCS: Performed by: CLINICAL NURSE SPECIALIST

## 2019-01-23 PROCEDURE — 2580000003 HC RX 258: Performed by: CLINICAL NURSE SPECIALIST

## 2019-01-23 RX ORDER — HEPARIN SODIUM (PORCINE) LOCK FLUSH IV SOLN 100 UNIT/ML 100 UNIT/ML
300 SOLUTION INTRAVENOUS PRN
Status: DISCONTINUED | OUTPATIENT
Start: 2019-01-23 | End: 2019-01-24 | Stop reason: HOSPADM

## 2019-01-23 RX ORDER — SODIUM CHLORIDE 0.9 % (FLUSH) 0.9 %
10 SYRINGE (ML) INJECTION PRN
Status: CANCELLED | OUTPATIENT
Start: 2019-01-23

## 2019-01-23 RX ORDER — HEPARIN SODIUM (PORCINE) LOCK FLUSH IV SOLN 100 UNIT/ML 100 UNIT/ML
300 SOLUTION INTRAVENOUS PRN
Status: CANCELLED | OUTPATIENT
Start: 2019-01-23

## 2019-01-23 RX ORDER — SODIUM CHLORIDE 0.9 % (FLUSH) 0.9 %
10 SYRINGE (ML) INJECTION PRN
Status: DISCONTINUED | OUTPATIENT
Start: 2019-01-23 | End: 2019-01-24 | Stop reason: HOSPADM

## 2019-01-23 RX ADMIN — Medication 10 ML: at 13:34

## 2019-01-23 RX ADMIN — CEFTRIAXONE SODIUM 2 G: 2 INJECTION, POWDER, FOR SOLUTION INTRAMUSCULAR; INTRAVENOUS at 13:34

## 2019-01-23 RX ADMIN — Medication 10 ML: at 14:05

## 2019-01-23 RX ADMIN — Medication 10 ML: at 14:06

## 2019-01-23 RX ADMIN — Medication 300 UNITS: at 14:07

## 2019-01-23 ASSESSMENT — PAIN SCALES - GENERAL: PAINLEVEL_OUTOF10: 0

## 2019-01-24 ENCOUNTER — HOSPITAL ENCOUNTER (OUTPATIENT)
Dept: INFUSION THERAPY | Age: 69
Setting detail: INFUSION SERIES
Discharge: HOME | End: 2019-01-24
Payer: MEDICARE

## 2019-01-24 VITALS
HEIGHT: 70 IN | TEMPERATURE: 97.7 F | HEART RATE: 85 BPM | RESPIRATION RATE: 16 BRPM | WEIGHT: 144 LBS | BODY MASS INDEX: 20.62 KG/M2 | DIASTOLIC BLOOD PRESSURE: 56 MMHG | SYSTOLIC BLOOD PRESSURE: 119 MMHG | OXYGEN SATURATION: 100 %

## 2019-01-24 DIAGNOSIS — A69.20 LYME DISEASE: ICD-10-CM

## 2019-01-24 LAB
ALBUMIN SERPL-MCNC: 3.2 G/DL (ref 3.5–5.2)
ALP BLD-CCNC: 86 U/L (ref 40–129)
ALT SERPL-CCNC: 13 U/L (ref 0–40)
ANION GAP SERPL CALCULATED.3IONS-SCNC: 11 MMOL/L (ref 7–16)
AST SERPL-CCNC: 23 U/L (ref 0–39)
BASOPHILS ABSOLUTE: 0.01 E9/L (ref 0–0.2)
BASOPHILS RELATIVE PERCENT: 0.2 % (ref 0–2)
BILIRUB SERPL-MCNC: 0.2 MG/DL (ref 0–1.2)
BUN BLDV-MCNC: 18 MG/DL (ref 8–23)
CALCIUM SERPL-MCNC: 9.1 MG/DL (ref 8.6–10.2)
CHLORIDE BLD-SCNC: 104 MMOL/L (ref 98–107)
CO2: 24 MMOL/L (ref 22–29)
CREAT SERPL-MCNC: 0.8 MG/DL (ref 0.7–1.2)
EOSINOPHILS ABSOLUTE: 0.1 E9/L (ref 0.05–0.5)
EOSINOPHILS RELATIVE PERCENT: 2.3 % (ref 0–6)
GFR AFRICAN AMERICAN: >60
GFR NON-AFRICAN AMERICAN: >60 ML/MIN/1.73
GLUCOSE BLD-MCNC: 107 MG/DL (ref 74–99)
HCT VFR BLD CALC: 28 % (ref 37–54)
HEMOGLOBIN: 9 G/DL (ref 12.5–16.5)
IMMATURE GRANULOCYTES #: 0.09 E9/L
IMMATURE GRANULOCYTES %: 2.1 % (ref 0–5)
LYMPHOCYTES ABSOLUTE: 1.09 E9/L (ref 1.5–4)
LYMPHOCYTES RELATIVE PERCENT: 25.5 % (ref 20–42)
MCH RBC QN AUTO: 28.3 PG (ref 26–35)
MCHC RBC AUTO-ENTMCNC: 32.1 % (ref 32–34.5)
MCV RBC AUTO: 88.1 FL (ref 80–99.9)
MONOCYTES ABSOLUTE: 0.23 E9/L (ref 0.1–0.95)
MONOCYTES RELATIVE PERCENT: 5.4 % (ref 2–12)
NEUTROPHILS ABSOLUTE: 2.76 E9/L (ref 1.8–7.3)
NEUTROPHILS RELATIVE PERCENT: 64.5 % (ref 43–80)
PDW BLD-RTO: 16.4 FL (ref 11.5–15)
PLATELET # BLD: 237 E9/L (ref 130–450)
PMV BLD AUTO: 10.2 FL (ref 7–12)
POTASSIUM SERPL-SCNC: 4.1 MMOL/L (ref 3.5–5)
RBC # BLD: 3.18 E12/L (ref 3.8–5.8)
SODIUM BLD-SCNC: 139 MMOL/L (ref 132–146)
TOTAL PROTEIN: 6.4 G/DL (ref 6.4–8.3)
WBC # BLD: 4.3 E9/L (ref 4.5–11.5)

## 2019-01-24 PROCEDURE — 85025 COMPLETE CBC W/AUTO DIFF WBC: CPT

## 2019-01-24 PROCEDURE — 2580000003 HC RX 258: Performed by: CLINICAL NURSE SPECIALIST

## 2019-01-24 PROCEDURE — 80053 COMPREHEN METABOLIC PANEL: CPT

## 2019-01-24 PROCEDURE — 96365 THER/PROPH/DIAG IV INF INIT: CPT

## 2019-01-24 PROCEDURE — 36415 COLL VENOUS BLD VENIPUNCTURE: CPT

## 2019-01-24 PROCEDURE — 6360000002 HC RX W HCPCS: Performed by: CLINICAL NURSE SPECIALIST

## 2019-01-24 RX ORDER — HEPARIN SODIUM (PORCINE) LOCK FLUSH IV SOLN 100 UNIT/ML 100 UNIT/ML
300 SOLUTION INTRAVENOUS PRN
Status: CANCELLED | OUTPATIENT
Start: 2019-01-24

## 2019-01-24 RX ORDER — SODIUM CHLORIDE 0.9 % (FLUSH) 0.9 %
10 SYRINGE (ML) INJECTION PRN
Status: CANCELLED | OUTPATIENT
Start: 2019-01-24

## 2019-01-24 RX ORDER — SODIUM CHLORIDE 0.9 % (FLUSH) 0.9 %
10 SYRINGE (ML) INJECTION PRN
Status: DISCONTINUED | OUTPATIENT
Start: 2019-01-24 | End: 2019-01-25 | Stop reason: HOSPADM

## 2019-01-24 RX ORDER — HEPARIN SODIUM (PORCINE) LOCK FLUSH IV SOLN 100 UNIT/ML 100 UNIT/ML
300 SOLUTION INTRAVENOUS PRN
Status: DISCONTINUED | OUTPATIENT
Start: 2019-01-24 | End: 2019-01-25 | Stop reason: HOSPADM

## 2019-01-24 RX ADMIN — Medication 10 ML: at 14:04

## 2019-01-24 RX ADMIN — Medication 10 ML: at 14:06

## 2019-01-24 RX ADMIN — Medication 10 ML: at 14:38

## 2019-01-24 RX ADMIN — CEFTRIAXONE SODIUM 2 G: 2 INJECTION, POWDER, FOR SOLUTION INTRAMUSCULAR; INTRAVENOUS at 14:07

## 2019-01-24 RX ADMIN — Medication 10 ML: at 14:37

## 2019-01-24 RX ADMIN — Medication 300 UNITS: at 14:38

## 2019-01-25 ENCOUNTER — HOSPITAL ENCOUNTER (OUTPATIENT)
Dept: INFUSION THERAPY | Age: 69
Setting detail: INFUSION SERIES
Discharge: HOME | End: 2019-01-25
Payer: MEDICARE

## 2019-01-25 VITALS
TEMPERATURE: 97.5 F | RESPIRATION RATE: 16 BRPM | BODY MASS INDEX: 20.66 KG/M2 | SYSTOLIC BLOOD PRESSURE: 124 MMHG | OXYGEN SATURATION: 100 % | WEIGHT: 144 LBS | HEART RATE: 92 BPM | DIASTOLIC BLOOD PRESSURE: 60 MMHG

## 2019-01-25 DIAGNOSIS — A69.20 LYME DISEASE: ICD-10-CM

## 2019-01-25 PROCEDURE — 2580000003 HC RX 258: Performed by: CLINICAL NURSE SPECIALIST

## 2019-01-25 PROCEDURE — 6360000002 HC RX W HCPCS: Performed by: CLINICAL NURSE SPECIALIST

## 2019-01-25 PROCEDURE — 96365 THER/PROPH/DIAG IV INF INIT: CPT

## 2019-01-25 RX ORDER — SODIUM CHLORIDE 0.9 % (FLUSH) 0.9 %
10 SYRINGE (ML) INJECTION PRN
Status: DISCONTINUED | OUTPATIENT
Start: 2019-01-25 | End: 2019-01-26 | Stop reason: HOSPADM

## 2019-01-25 RX ORDER — HEPARIN SODIUM (PORCINE) LOCK FLUSH IV SOLN 100 UNIT/ML 100 UNIT/ML
300 SOLUTION INTRAVENOUS PRN
Status: DISCONTINUED | OUTPATIENT
Start: 2019-01-25 | End: 2019-01-26 | Stop reason: HOSPADM

## 2019-01-25 RX ORDER — SODIUM CHLORIDE 0.9 % (FLUSH) 0.9 %
10 SYRINGE (ML) INJECTION PRN
Status: CANCELLED | OUTPATIENT
Start: 2019-01-25

## 2019-01-25 RX ORDER — HEPARIN SODIUM (PORCINE) LOCK FLUSH IV SOLN 100 UNIT/ML 100 UNIT/ML
300 SOLUTION INTRAVENOUS PRN
Status: CANCELLED | OUTPATIENT
Start: 2019-01-25

## 2019-01-25 RX ADMIN — Medication 300 UNITS: at 14:38

## 2019-01-25 RX ADMIN — Medication 10 ML: at 14:37

## 2019-01-25 RX ADMIN — Medication 10 ML: at 14:38

## 2019-01-25 RX ADMIN — Medication 10 ML: at 14:04

## 2019-01-25 RX ADMIN — CEFTRIAXONE SODIUM 2 G: 2 INJECTION, POWDER, FOR SOLUTION INTRAMUSCULAR; INTRAVENOUS at 14:05

## 2019-01-25 ASSESSMENT — PAIN DESCRIPTION - DESCRIPTORS: DESCRIPTORS: ACHING;DISCOMFORT

## 2019-01-25 ASSESSMENT — PAIN DESCRIPTION - ORIENTATION: ORIENTATION: RIGHT;LEFT

## 2019-01-25 ASSESSMENT — PAIN DESCRIPTION - LOCATION: LOCATION: GENERALIZED

## 2019-01-25 ASSESSMENT — PAIN SCALES - GENERAL: PAINLEVEL_OUTOF10: 0

## 2019-01-25 ASSESSMENT — PAIN DESCRIPTION - PAIN TYPE: TYPE: CHRONIC PAIN

## 2019-01-26 ENCOUNTER — HOSPITAL ENCOUNTER (OUTPATIENT)
Dept: INFUSION THERAPY | Age: 69
Setting detail: INFUSION SERIES
Discharge: HOME | End: 2019-01-26
Payer: MEDICARE

## 2019-01-26 VITALS
HEART RATE: 86 BPM | OXYGEN SATURATION: 100 % | RESPIRATION RATE: 16 BRPM | TEMPERATURE: 97.8 F | DIASTOLIC BLOOD PRESSURE: 58 MMHG | HEIGHT: 70 IN | WEIGHT: 144 LBS | BODY MASS INDEX: 20.62 KG/M2 | SYSTOLIC BLOOD PRESSURE: 127 MMHG

## 2019-01-26 DIAGNOSIS — A69.20 LYME DISEASE: ICD-10-CM

## 2019-01-26 PROCEDURE — 96365 THER/PROPH/DIAG IV INF INIT: CPT

## 2019-01-26 PROCEDURE — 2580000003 HC RX 258: Performed by: CLINICAL NURSE SPECIALIST

## 2019-01-26 PROCEDURE — 6360000002 HC RX W HCPCS: Performed by: CLINICAL NURSE SPECIALIST

## 2019-01-26 RX ORDER — HEPARIN SODIUM (PORCINE) LOCK FLUSH IV SOLN 100 UNIT/ML 100 UNIT/ML
300 SOLUTION INTRAVENOUS PRN
Status: DISCONTINUED | OUTPATIENT
Start: 2019-01-26 | End: 2019-01-27 | Stop reason: HOSPADM

## 2019-01-26 RX ORDER — SODIUM CHLORIDE 0.9 % (FLUSH) 0.9 %
10 SYRINGE (ML) INJECTION PRN
Status: CANCELLED | OUTPATIENT
Start: 2019-01-26

## 2019-01-26 RX ORDER — HEPARIN SODIUM (PORCINE) LOCK FLUSH IV SOLN 100 UNIT/ML 100 UNIT/ML
300 SOLUTION INTRAVENOUS PRN
Status: CANCELLED | OUTPATIENT
Start: 2019-01-26

## 2019-01-26 RX ORDER — SODIUM CHLORIDE 0.9 % (FLUSH) 0.9 %
10 SYRINGE (ML) INJECTION PRN
Status: DISCONTINUED | OUTPATIENT
Start: 2019-01-26 | End: 2019-01-27 | Stop reason: HOSPADM

## 2019-01-26 RX ADMIN — Medication 300 UNITS: at 09:41

## 2019-01-26 RX ADMIN — DEXTROSE 2 G: 50 INJECTION, SOLUTION INTRAVENOUS at 09:12

## 2019-01-26 RX ADMIN — Medication 10 ML: at 09:40

## 2019-01-26 RX ADMIN — Medication 10 ML: at 09:41

## 2019-01-26 RX ADMIN — Medication 10 ML: at 09:11

## 2019-01-27 ENCOUNTER — HOSPITAL ENCOUNTER (OUTPATIENT)
Dept: INFUSION THERAPY | Age: 69
Setting detail: INFUSION SERIES
Discharge: HOME | End: 2019-01-27
Payer: MEDICARE

## 2019-01-27 VITALS
DIASTOLIC BLOOD PRESSURE: 58 MMHG | RESPIRATION RATE: 16 BRPM | HEART RATE: 86 BPM | OXYGEN SATURATION: 100 % | SYSTOLIC BLOOD PRESSURE: 107 MMHG | TEMPERATURE: 97.8 F

## 2019-01-27 DIAGNOSIS — A69.20 LYME DISEASE: ICD-10-CM

## 2019-01-27 PROCEDURE — 96365 THER/PROPH/DIAG IV INF INIT: CPT

## 2019-01-27 PROCEDURE — 2580000003 HC RX 258: Performed by: CLINICAL NURSE SPECIALIST

## 2019-01-27 PROCEDURE — 6360000002 HC RX W HCPCS: Performed by: CLINICAL NURSE SPECIALIST

## 2019-01-27 RX ORDER — HEPARIN SODIUM (PORCINE) LOCK FLUSH IV SOLN 100 UNIT/ML 100 UNIT/ML
300 SOLUTION INTRAVENOUS PRN
Status: DISCONTINUED | OUTPATIENT
Start: 2019-01-27 | End: 2019-01-28 | Stop reason: HOSPADM

## 2019-01-27 RX ORDER — SODIUM CHLORIDE 0.9 % (FLUSH) 0.9 %
10 SYRINGE (ML) INJECTION PRN
Status: CANCELLED | OUTPATIENT
Start: 2019-01-27

## 2019-01-27 RX ORDER — SODIUM CHLORIDE 0.9 % (FLUSH) 0.9 %
10 SYRINGE (ML) INJECTION PRN
Status: DISCONTINUED | OUTPATIENT
Start: 2019-01-27 | End: 2019-01-28 | Stop reason: HOSPADM

## 2019-01-27 RX ORDER — HEPARIN SODIUM (PORCINE) LOCK FLUSH IV SOLN 100 UNIT/ML 100 UNIT/ML
300 SOLUTION INTRAVENOUS PRN
Status: CANCELLED | OUTPATIENT
Start: 2019-01-27

## 2019-01-27 RX ADMIN — Medication 10 ML: at 08:51

## 2019-01-27 RX ADMIN — Medication 10 ML: at 09:26

## 2019-01-27 RX ADMIN — CEFTRIAXONE SODIUM 2 G: 2 INJECTION, POWDER, FOR SOLUTION INTRAMUSCULAR; INTRAVENOUS at 08:56

## 2019-01-27 RX ADMIN — Medication 10 ML: at 09:27

## 2019-01-27 RX ADMIN — Medication 300 UNITS: at 09:27

## 2019-01-27 ASSESSMENT — PAIN SCALES - GENERAL: PAINLEVEL_OUTOF10: 0

## 2019-01-28 ENCOUNTER — HOSPITAL ENCOUNTER (OUTPATIENT)
Dept: INFUSION THERAPY | Age: 69
Setting detail: INFUSION SERIES
Discharge: HOME | End: 2019-01-28
Payer: MEDICARE

## 2019-01-28 VITALS
OXYGEN SATURATION: 100 % | SYSTOLIC BLOOD PRESSURE: 118 MMHG | RESPIRATION RATE: 16 BRPM | DIASTOLIC BLOOD PRESSURE: 56 MMHG | TEMPERATURE: 97.6 F | HEART RATE: 83 BPM

## 2019-01-28 DIAGNOSIS — A69.20 LYME DISEASE: ICD-10-CM

## 2019-01-28 LAB
ALBUMIN SERPL-MCNC: 3 G/DL (ref 3.5–5.2)
ALP BLD-CCNC: 87 U/L (ref 40–129)
ALT SERPL-CCNC: 13 U/L (ref 0–40)
ANION GAP SERPL CALCULATED.3IONS-SCNC: 9 MMOL/L (ref 7–16)
AST SERPL-CCNC: 25 U/L (ref 0–39)
BASOPHILS ABSOLUTE: 0.01 E9/L (ref 0–0.2)
BASOPHILS RELATIVE PERCENT: 0.2 % (ref 0–2)
BILIRUB SERPL-MCNC: 0.3 MG/DL (ref 0–1.2)
BUN BLDV-MCNC: 18 MG/DL (ref 8–23)
C-REACTIVE PROTEIN: 2.5 MG/DL (ref 0–0.4)
CALCIUM SERPL-MCNC: 8.6 MG/DL (ref 8.6–10.2)
CHLORIDE BLD-SCNC: 104 MMOL/L (ref 98–107)
CO2: 24 MMOL/L (ref 22–29)
CREAT SERPL-MCNC: 0.8 MG/DL (ref 0.7–1.2)
EOSINOPHILS ABSOLUTE: 0.19 E9/L (ref 0.05–0.5)
EOSINOPHILS RELATIVE PERCENT: 4.6 % (ref 0–6)
GFR AFRICAN AMERICAN: >60
GFR NON-AFRICAN AMERICAN: >60 ML/MIN/1.73
GLUCOSE BLD-MCNC: 115 MG/DL (ref 74–99)
HCT VFR BLD CALC: 27.2 % (ref 37–54)
HEMOGLOBIN: 8.7 G/DL (ref 12.5–16.5)
IMMATURE GRANULOCYTES #: 0.08 E9/L
IMMATURE GRANULOCYTES %: 2 % (ref 0–5)
LYMPHOCYTES ABSOLUTE: 1.23 E9/L (ref 1.5–4)
LYMPHOCYTES RELATIVE PERCENT: 30.1 % (ref 20–42)
MCH RBC QN AUTO: 28.2 PG (ref 26–35)
MCHC RBC AUTO-ENTMCNC: 32 % (ref 32–34.5)
MCV RBC AUTO: 88 FL (ref 80–99.9)
MONOCYTES ABSOLUTE: 0.19 E9/L (ref 0.1–0.95)
MONOCYTES RELATIVE PERCENT: 4.6 % (ref 2–12)
NEUTROPHILS ABSOLUTE: 2.39 E9/L (ref 1.8–7.3)
NEUTROPHILS RELATIVE PERCENT: 58.5 % (ref 43–80)
PDW BLD-RTO: 16.9 FL (ref 11.5–15)
PLATELET # BLD: 236 E9/L (ref 130–450)
PMV BLD AUTO: 10.8 FL (ref 7–12)
POTASSIUM SERPL-SCNC: 4.1 MMOL/L (ref 3.5–5)
RBC # BLD: 3.09 E12/L (ref 3.8–5.8)
SEDIMENTATION RATE, ERYTHROCYTE: 65 MM/HR (ref 0–15)
SODIUM BLD-SCNC: 137 MMOL/L (ref 132–146)
TOTAL PROTEIN: 6.3 G/DL (ref 6.4–8.3)
WBC # BLD: 4.1 E9/L (ref 4.5–11.5)

## 2019-01-28 PROCEDURE — 85651 RBC SED RATE NONAUTOMATED: CPT

## 2019-01-28 PROCEDURE — 85025 COMPLETE CBC W/AUTO DIFF WBC: CPT

## 2019-01-28 PROCEDURE — 6360000002 HC RX W HCPCS: Performed by: CLINICAL NURSE SPECIALIST

## 2019-01-28 PROCEDURE — 86140 C-REACTIVE PROTEIN: CPT

## 2019-01-28 PROCEDURE — 80053 COMPREHEN METABOLIC PANEL: CPT

## 2019-01-28 PROCEDURE — 36415 COLL VENOUS BLD VENIPUNCTURE: CPT

## 2019-01-28 PROCEDURE — 96365 THER/PROPH/DIAG IV INF INIT: CPT

## 2019-01-28 PROCEDURE — 2580000003 HC RX 258: Performed by: CLINICAL NURSE SPECIALIST

## 2019-01-28 RX ORDER — SODIUM CHLORIDE 0.9 % (FLUSH) 0.9 %
10 SYRINGE (ML) INJECTION PRN
Status: DISCONTINUED | OUTPATIENT
Start: 2019-01-28 | End: 2019-01-29 | Stop reason: HOSPADM

## 2019-01-28 RX ORDER — HEPARIN SODIUM (PORCINE) LOCK FLUSH IV SOLN 100 UNIT/ML 100 UNIT/ML
300 SOLUTION INTRAVENOUS PRN
Status: DISCONTINUED | OUTPATIENT
Start: 2019-01-28 | End: 2019-01-29 | Stop reason: HOSPADM

## 2019-01-28 RX ORDER — SODIUM CHLORIDE 0.9 % (FLUSH) 0.9 %
10 SYRINGE (ML) INJECTION PRN
Status: CANCELLED | OUTPATIENT
Start: 2019-01-28

## 2019-01-28 RX ORDER — HEPARIN SODIUM (PORCINE) LOCK FLUSH IV SOLN 100 UNIT/ML 100 UNIT/ML
300 SOLUTION INTRAVENOUS PRN
Status: CANCELLED | OUTPATIENT
Start: 2019-01-28

## 2019-01-28 RX ADMIN — Medication 300 UNITS: at 14:29

## 2019-01-28 RX ADMIN — Medication 10 ML: at 14:29

## 2019-01-28 RX ADMIN — Medication 10 ML: at 13:55

## 2019-01-28 RX ADMIN — Medication 10 ML: at 14:28

## 2019-01-28 RX ADMIN — CEFTRIAXONE SODIUM 2 G: 2 INJECTION, POWDER, FOR SOLUTION INTRAMUSCULAR; INTRAVENOUS at 13:57

## 2019-01-28 RX ADMIN — Medication 10 ML: at 13:56

## 2019-01-28 ASSESSMENT — PAIN DESCRIPTION - PAIN TYPE: TYPE: CHRONIC PAIN

## 2019-01-28 ASSESSMENT — PAIN DESCRIPTION - ORIENTATION: ORIENTATION: LEFT;RIGHT

## 2019-01-28 ASSESSMENT — PAIN DESCRIPTION - FREQUENCY: FREQUENCY: INTERMITTENT

## 2019-01-28 ASSESSMENT — PAIN DESCRIPTION - DESCRIPTORS: DESCRIPTORS: ACHING

## 2019-01-28 ASSESSMENT — PAIN DESCRIPTION - LOCATION: LOCATION: HAND

## 2019-01-29 ENCOUNTER — HOSPITAL ENCOUNTER (OUTPATIENT)
Dept: INFUSION THERAPY | Age: 69
Setting detail: INFUSION SERIES
Discharge: HOME | End: 2019-01-29
Payer: MEDICARE

## 2019-01-29 VITALS
HEIGHT: 70 IN | OXYGEN SATURATION: 99 % | DIASTOLIC BLOOD PRESSURE: 56 MMHG | RESPIRATION RATE: 16 BRPM | HEART RATE: 84 BPM | TEMPERATURE: 97.5 F | BODY MASS INDEX: 20.62 KG/M2 | SYSTOLIC BLOOD PRESSURE: 121 MMHG | WEIGHT: 144 LBS

## 2019-01-29 DIAGNOSIS — A69.20 LYME DISEASE: ICD-10-CM

## 2019-01-29 PROCEDURE — 2580000003 HC RX 258: Performed by: CLINICAL NURSE SPECIALIST

## 2019-01-29 PROCEDURE — 6360000002 HC RX W HCPCS: Performed by: CLINICAL NURSE SPECIALIST

## 2019-01-29 PROCEDURE — 96365 THER/PROPH/DIAG IV INF INIT: CPT

## 2019-01-29 RX ORDER — HEPARIN SODIUM (PORCINE) LOCK FLUSH IV SOLN 100 UNIT/ML 100 UNIT/ML
300 SOLUTION INTRAVENOUS PRN
Status: CANCELLED | OUTPATIENT
Start: 2019-01-29

## 2019-01-29 RX ORDER — SODIUM CHLORIDE 0.9 % (FLUSH) 0.9 %
10 SYRINGE (ML) INJECTION PRN
Status: DISCONTINUED | OUTPATIENT
Start: 2019-01-29 | End: 2019-01-30 | Stop reason: HOSPADM

## 2019-01-29 RX ORDER — HEPARIN SODIUM (PORCINE) LOCK FLUSH IV SOLN 100 UNIT/ML 100 UNIT/ML
300 SOLUTION INTRAVENOUS PRN
Status: DISCONTINUED | OUTPATIENT
Start: 2019-01-29 | End: 2019-01-30 | Stop reason: HOSPADM

## 2019-01-29 RX ORDER — SODIUM CHLORIDE 0.9 % (FLUSH) 0.9 %
10 SYRINGE (ML) INJECTION PRN
Status: CANCELLED | OUTPATIENT
Start: 2019-01-29

## 2019-01-29 RX ADMIN — Medication 10 ML: at 14:33

## 2019-01-29 RX ADMIN — Medication 10 ML: at 13:56

## 2019-01-29 RX ADMIN — CEFTRIAXONE SODIUM 2 G: 2 INJECTION, POWDER, FOR SOLUTION INTRAMUSCULAR; INTRAVENOUS at 13:56

## 2019-01-29 RX ADMIN — Medication 300 UNITS: at 14:34

## 2019-01-29 RX ADMIN — Medication 10 ML: at 14:34

## 2019-01-29 ASSESSMENT — PAIN DESCRIPTION - ONSET: ONSET: ON-GOING

## 2019-01-29 ASSESSMENT — PAIN DESCRIPTION - FREQUENCY: FREQUENCY: INTERMITTENT

## 2019-01-29 ASSESSMENT — PAIN DESCRIPTION - DESCRIPTORS: DESCRIPTORS: DISCOMFORT

## 2019-01-29 ASSESSMENT — PAIN SCALES - GENERAL: PAINLEVEL_OUTOF10: 4

## 2019-01-29 ASSESSMENT — PAIN DESCRIPTION - PAIN TYPE: TYPE: ACUTE PAIN

## 2019-01-29 ASSESSMENT — PAIN DESCRIPTION - LOCATION: LOCATION: FINGER (COMMENT WHICH ONE)

## 2019-01-29 ASSESSMENT — PAIN - FUNCTIONAL ASSESSMENT: PAIN_FUNCTIONAL_ASSESSMENT: ACTIVITIES ARE NOT PREVENTED

## 2019-01-29 ASSESSMENT — PAIN DESCRIPTION - ORIENTATION: ORIENTATION: RIGHT;LEFT

## 2019-01-30 ENCOUNTER — HOSPITAL ENCOUNTER (OUTPATIENT)
Dept: INFUSION THERAPY | Age: 69
Setting detail: INFUSION SERIES
Discharge: HOME | End: 2019-01-30
Payer: MEDICARE

## 2019-01-30 VITALS
TEMPERATURE: 97.8 F | RESPIRATION RATE: 16 BRPM | SYSTOLIC BLOOD PRESSURE: 120 MMHG | HEIGHT: 70 IN | WEIGHT: 144 LBS | BODY MASS INDEX: 20.62 KG/M2 | DIASTOLIC BLOOD PRESSURE: 59 MMHG | OXYGEN SATURATION: 97 % | HEART RATE: 82 BPM

## 2019-01-30 DIAGNOSIS — A69.20 LYME DISEASE: ICD-10-CM

## 2019-01-30 PROCEDURE — 96365 THER/PROPH/DIAG IV INF INIT: CPT

## 2019-01-30 PROCEDURE — 2580000003 HC RX 258: Performed by: CLINICAL NURSE SPECIALIST

## 2019-01-30 PROCEDURE — 6360000002 HC RX W HCPCS: Performed by: CLINICAL NURSE SPECIALIST

## 2019-01-30 RX ORDER — SODIUM CHLORIDE 0.9 % (FLUSH) 0.9 %
10 SYRINGE (ML) INJECTION PRN
Status: DISCONTINUED | OUTPATIENT
Start: 2019-01-30 | End: 2019-01-31 | Stop reason: HOSPADM

## 2019-01-30 RX ORDER — HEPARIN SODIUM (PORCINE) LOCK FLUSH IV SOLN 100 UNIT/ML 100 UNIT/ML
300 SOLUTION INTRAVENOUS PRN
Status: CANCELLED | OUTPATIENT
Start: 2019-01-30

## 2019-01-30 RX ORDER — SODIUM CHLORIDE 0.9 % (FLUSH) 0.9 %
10 SYRINGE (ML) INJECTION PRN
Status: CANCELLED | OUTPATIENT
Start: 2019-01-30

## 2019-01-30 RX ORDER — HEPARIN SODIUM (PORCINE) LOCK FLUSH IV SOLN 100 UNIT/ML 100 UNIT/ML
300 SOLUTION INTRAVENOUS PRN
Status: DISCONTINUED | OUTPATIENT
Start: 2019-01-30 | End: 2019-01-31 | Stop reason: HOSPADM

## 2019-01-30 RX ADMIN — Medication 10 ML: at 13:59

## 2019-01-30 RX ADMIN — Medication 10 ML: at 14:32

## 2019-01-30 RX ADMIN — Medication 10 ML: at 14:31

## 2019-01-30 RX ADMIN — CEFTRIAXONE SODIUM 2 G: 2 INJECTION, POWDER, FOR SOLUTION INTRAMUSCULAR; INTRAVENOUS at 14:00

## 2019-01-30 RX ADMIN — Medication 300 UNITS: at 14:32

## 2019-01-30 ASSESSMENT — PAIN DESCRIPTION - ORIENTATION: ORIENTATION: RIGHT;LEFT

## 2019-01-30 ASSESSMENT — PAIN DESCRIPTION - FREQUENCY: FREQUENCY: INTERMITTENT

## 2019-01-30 ASSESSMENT — PAIN SCALES - GENERAL: PAINLEVEL_OUTOF10: 5

## 2019-01-30 ASSESSMENT — PAIN DESCRIPTION - ONSET: ONSET: ON-GOING

## 2019-01-30 ASSESSMENT — PAIN DESCRIPTION - DESCRIPTORS: DESCRIPTORS: ACHING

## 2019-01-30 ASSESSMENT — PAIN DESCRIPTION - LOCATION: LOCATION: FINGER (COMMENT WHICH ONE)

## 2019-01-30 ASSESSMENT — PAIN DESCRIPTION - PROGRESSION: CLINICAL_PROGRESSION: NOT CHANGED

## 2019-01-30 ASSESSMENT — PAIN DESCRIPTION - PAIN TYPE: TYPE: ACUTE PAIN

## 2019-01-31 ENCOUNTER — HOSPITAL ENCOUNTER (OUTPATIENT)
Dept: INFUSION THERAPY | Age: 69
Setting detail: INFUSION SERIES
Discharge: HOME | End: 2019-01-31
Payer: MEDICARE

## 2019-01-31 VITALS
HEIGHT: 70 IN | TEMPERATURE: 98.5 F | WEIGHT: 144 LBS | RESPIRATION RATE: 16 BRPM | OXYGEN SATURATION: 100 % | DIASTOLIC BLOOD PRESSURE: 60 MMHG | HEART RATE: 89 BPM | BODY MASS INDEX: 20.62 KG/M2 | SYSTOLIC BLOOD PRESSURE: 121 MMHG

## 2019-01-31 DIAGNOSIS — A69.20 LYME DISEASE: ICD-10-CM

## 2019-01-31 LAB
ALBUMIN SERPL-MCNC: 3.1 G/DL (ref 3.5–5.2)
ALP BLD-CCNC: 91 U/L (ref 40–129)
ALT SERPL-CCNC: 14 U/L (ref 0–40)
ANION GAP SERPL CALCULATED.3IONS-SCNC: 9 MMOL/L (ref 7–16)
AST SERPL-CCNC: 26 U/L (ref 0–39)
BASOPHILS ABSOLUTE: 0.01 E9/L (ref 0–0.2)
BASOPHILS RELATIVE PERCENT: 0.3 % (ref 0–2)
BILIRUB SERPL-MCNC: 0.3 MG/DL (ref 0–1.2)
BUN BLDV-MCNC: 23 MG/DL (ref 8–23)
CALCIUM SERPL-MCNC: 8.7 MG/DL (ref 8.6–10.2)
CHLORIDE BLD-SCNC: 102 MMOL/L (ref 98–107)
CO2: 24 MMOL/L (ref 22–29)
CREAT SERPL-MCNC: 0.9 MG/DL (ref 0.7–1.2)
EOSINOPHILS ABSOLUTE: 0.11 E9/L (ref 0.05–0.5)
EOSINOPHILS RELATIVE PERCENT: 2.9 % (ref 0–6)
GFR AFRICAN AMERICAN: >60
GFR NON-AFRICAN AMERICAN: >60 ML/MIN/1.73
GLUCOSE BLD-MCNC: 109 MG/DL (ref 74–99)
HCT VFR BLD CALC: 25.7 % (ref 37–54)
HEMOGLOBIN: 8.5 G/DL (ref 12.5–16.5)
IMMATURE GRANULOCYTES #: 0.1 E9/L
IMMATURE GRANULOCYTES %: 2.7 % (ref 0–5)
LYMPHOCYTES ABSOLUTE: 0.96 E9/L (ref 1.5–4)
LYMPHOCYTES RELATIVE PERCENT: 25.7 % (ref 20–42)
MCH RBC QN AUTO: 29 PG (ref 26–35)
MCHC RBC AUTO-ENTMCNC: 33.1 % (ref 32–34.5)
MCV RBC AUTO: 87.7 FL (ref 80–99.9)
MONOCYTES ABSOLUTE: 0.23 E9/L (ref 0.1–0.95)
MONOCYTES RELATIVE PERCENT: 6.2 % (ref 2–12)
NEUTROPHILS ABSOLUTE: 2.32 E9/L (ref 1.8–7.3)
NEUTROPHILS RELATIVE PERCENT: 62.2 % (ref 43–80)
PDW BLD-RTO: 17.2 FL (ref 11.5–15)
PLATELET # BLD: 222 E9/L (ref 130–450)
PMV BLD AUTO: 10.9 FL (ref 7–12)
POTASSIUM SERPL-SCNC: 4 MMOL/L (ref 3.5–5)
RBC # BLD: 2.93 E12/L (ref 3.8–5.8)
SODIUM BLD-SCNC: 135 MMOL/L (ref 132–146)
TOTAL PROTEIN: 6 G/DL (ref 6.4–8.3)
WBC # BLD: 3.7 E9/L (ref 4.5–11.5)

## 2019-01-31 PROCEDURE — 85025 COMPLETE CBC W/AUTO DIFF WBC: CPT

## 2019-01-31 PROCEDURE — 96365 THER/PROPH/DIAG IV INF INIT: CPT

## 2019-01-31 PROCEDURE — 36415 COLL VENOUS BLD VENIPUNCTURE: CPT

## 2019-01-31 PROCEDURE — 2580000003 HC RX 258: Performed by: CLINICAL NURSE SPECIALIST

## 2019-01-31 PROCEDURE — 6360000002 HC RX W HCPCS: Performed by: CLINICAL NURSE SPECIALIST

## 2019-01-31 PROCEDURE — 80053 COMPREHEN METABOLIC PANEL: CPT

## 2019-01-31 RX ORDER — HEPARIN SODIUM (PORCINE) LOCK FLUSH IV SOLN 100 UNIT/ML 100 UNIT/ML
300 SOLUTION INTRAVENOUS PRN
Status: CANCELLED | OUTPATIENT
Start: 2019-01-31

## 2019-01-31 RX ORDER — SODIUM CHLORIDE 0.9 % (FLUSH) 0.9 %
10 SYRINGE (ML) INJECTION PRN
Status: CANCELLED | OUTPATIENT
Start: 2019-01-31

## 2019-01-31 RX ORDER — SODIUM CHLORIDE 0.9 % (FLUSH) 0.9 %
10 SYRINGE (ML) INJECTION PRN
Status: DISCONTINUED | OUTPATIENT
Start: 2019-01-31 | End: 2019-02-01 | Stop reason: HOSPADM

## 2019-01-31 RX ORDER — HEPARIN SODIUM (PORCINE) LOCK FLUSH IV SOLN 100 UNIT/ML 100 UNIT/ML
300 SOLUTION INTRAVENOUS PRN
Status: DISCONTINUED | OUTPATIENT
Start: 2019-01-31 | End: 2019-02-01 | Stop reason: HOSPADM

## 2019-01-31 RX ADMIN — Medication 300 UNITS: at 14:24

## 2019-01-31 RX ADMIN — Medication 10 ML: at 13:54

## 2019-01-31 RX ADMIN — Medication 10 ML: at 14:23

## 2019-01-31 RX ADMIN — Medication 10 ML: at 14:24

## 2019-01-31 RX ADMIN — CEFTRIAXONE SODIUM 2 G: 2 INJECTION, POWDER, FOR SOLUTION INTRAMUSCULAR; INTRAVENOUS at 13:55

## 2019-01-31 RX ADMIN — Medication 10 ML: at 13:55

## 2019-02-01 ENCOUNTER — HOSPITAL ENCOUNTER (OUTPATIENT)
Dept: INFUSION THERAPY | Age: 69
Setting detail: INFUSION SERIES
Discharge: HOME | End: 2019-02-01
Payer: MEDICARE

## 2019-02-01 VITALS
SYSTOLIC BLOOD PRESSURE: 136 MMHG | TEMPERATURE: 98.8 F | BODY MASS INDEX: 20.62 KG/M2 | WEIGHT: 144 LBS | OXYGEN SATURATION: 97 % | HEART RATE: 98 BPM | HEIGHT: 70 IN | RESPIRATION RATE: 16 BRPM | DIASTOLIC BLOOD PRESSURE: 66 MMHG

## 2019-02-01 DIAGNOSIS — A69.20 LYME DISEASE: ICD-10-CM

## 2019-02-01 PROCEDURE — 96365 THER/PROPH/DIAG IV INF INIT: CPT

## 2019-02-01 PROCEDURE — 2580000003 HC RX 258: Performed by: CLINICAL NURSE SPECIALIST

## 2019-02-01 PROCEDURE — 6360000002 HC RX W HCPCS: Performed by: CLINICAL NURSE SPECIALIST

## 2019-02-01 RX ORDER — SODIUM CHLORIDE 0.9 % (FLUSH) 0.9 %
10 SYRINGE (ML) INJECTION PRN
Status: CANCELLED | OUTPATIENT
Start: 2019-02-01

## 2019-02-01 RX ORDER — SODIUM CHLORIDE 0.9 % (FLUSH) 0.9 %
10 SYRINGE (ML) INJECTION PRN
Status: DISCONTINUED | OUTPATIENT
Start: 2019-02-01 | End: 2019-02-02 | Stop reason: HOSPADM

## 2019-02-01 RX ORDER — HEPARIN SODIUM (PORCINE) LOCK FLUSH IV SOLN 100 UNIT/ML 100 UNIT/ML
300 SOLUTION INTRAVENOUS PRN
Status: CANCELLED | OUTPATIENT
Start: 2019-02-01

## 2019-02-01 RX ORDER — HEPARIN SODIUM (PORCINE) LOCK FLUSH IV SOLN 100 UNIT/ML 100 UNIT/ML
300 SOLUTION INTRAVENOUS PRN
Status: DISCONTINUED | OUTPATIENT
Start: 2019-02-01 | End: 2019-02-02 | Stop reason: HOSPADM

## 2019-02-01 RX ADMIN — Medication 10 ML: at 14:20

## 2019-02-01 RX ADMIN — Medication 10 ML: at 13:46

## 2019-02-01 RX ADMIN — Medication 10 ML: at 14:22

## 2019-02-01 RX ADMIN — Medication 300 UNITS: at 14:22

## 2019-02-01 RX ADMIN — CEFTRIAXONE SODIUM 2 G: 2 INJECTION, POWDER, FOR SOLUTION INTRAMUSCULAR; INTRAVENOUS at 13:47

## 2019-02-02 ENCOUNTER — HOSPITAL ENCOUNTER (OUTPATIENT)
Dept: INFUSION THERAPY | Age: 69
Setting detail: INFUSION SERIES
Discharge: HOME | End: 2019-02-02
Payer: MEDICARE

## 2019-02-02 VITALS
WEIGHT: 144 LBS | OXYGEN SATURATION: 100 % | TEMPERATURE: 98.1 F | DIASTOLIC BLOOD PRESSURE: 59 MMHG | HEIGHT: 70 IN | BODY MASS INDEX: 20.62 KG/M2 | SYSTOLIC BLOOD PRESSURE: 110 MMHG | HEART RATE: 86 BPM | RESPIRATION RATE: 16 BRPM

## 2019-02-02 DIAGNOSIS — A69.20 LYME DISEASE: ICD-10-CM

## 2019-02-02 PROCEDURE — 2580000003 HC RX 258: Performed by: CLINICAL NURSE SPECIALIST

## 2019-02-02 PROCEDURE — 96365 THER/PROPH/DIAG IV INF INIT: CPT

## 2019-02-02 PROCEDURE — 6360000002 HC RX W HCPCS: Performed by: CLINICAL NURSE SPECIALIST

## 2019-02-02 RX ORDER — SODIUM CHLORIDE 0.9 % (FLUSH) 0.9 %
10 SYRINGE (ML) INJECTION PRN
Status: CANCELLED | OUTPATIENT
Start: 2019-02-02

## 2019-02-02 RX ORDER — HEPARIN SODIUM (PORCINE) LOCK FLUSH IV SOLN 100 UNIT/ML 100 UNIT/ML
300 SOLUTION INTRAVENOUS PRN
Status: DISCONTINUED | OUTPATIENT
Start: 2019-02-02 | End: 2019-02-03 | Stop reason: HOSPADM

## 2019-02-02 RX ORDER — HEPARIN SODIUM (PORCINE) LOCK FLUSH IV SOLN 100 UNIT/ML 100 UNIT/ML
300 SOLUTION INTRAVENOUS PRN
Status: CANCELLED | OUTPATIENT
Start: 2019-02-02

## 2019-02-02 RX ORDER — SODIUM CHLORIDE 0.9 % (FLUSH) 0.9 %
10 SYRINGE (ML) INJECTION PRN
Status: DISCONTINUED | OUTPATIENT
Start: 2019-02-02 | End: 2019-02-03 | Stop reason: HOSPADM

## 2019-02-02 RX ADMIN — Medication 10 ML: at 09:05

## 2019-02-02 RX ADMIN — Medication 10 ML: at 09:04

## 2019-02-02 RX ADMIN — CEFTRIAXONE SODIUM 2 G: 2 INJECTION, POWDER, FOR SOLUTION INTRAMUSCULAR; INTRAVENOUS at 08:31

## 2019-02-02 RX ADMIN — Medication 10 ML: at 08:31

## 2019-02-02 RX ADMIN — Medication 300 UNITS: at 09:05

## 2019-02-02 NOTE — PROGRESS NOTES
Tolerated infusion well. Therapy plan reviewed with patient. Verbalizes understanding. Reviewed AVS with patient, reviewed medication information, verbalizes good knowledge of current plan, and has no signs or symptoms to report at this time. Declines copy of AVS. Patient instructed on s/s infection/complication with PICC line to report and instructed on keeping PICC dressing clean, dry and intact patient verbalizes understanding. Next appointment scheduled.   Froylan Juarez RN  2/2/2019  5909

## 2019-02-03 ENCOUNTER — HOSPITAL ENCOUNTER (OUTPATIENT)
Dept: INFUSION THERAPY | Age: 69
Setting detail: INFUSION SERIES
Discharge: HOME | End: 2019-02-03
Payer: MEDICARE

## 2019-02-03 VITALS
BODY MASS INDEX: 20.62 KG/M2 | HEIGHT: 70 IN | WEIGHT: 144 LBS | SYSTOLIC BLOOD PRESSURE: 110 MMHG | HEART RATE: 84 BPM | DIASTOLIC BLOOD PRESSURE: 51 MMHG | TEMPERATURE: 97.7 F | RESPIRATION RATE: 16 BRPM | OXYGEN SATURATION: 99 %

## 2019-02-03 DIAGNOSIS — A69.20 LYME DISEASE: ICD-10-CM

## 2019-02-03 PROCEDURE — 96365 THER/PROPH/DIAG IV INF INIT: CPT

## 2019-02-03 PROCEDURE — 2580000003 HC RX 258: Performed by: CLINICAL NURSE SPECIALIST

## 2019-02-03 PROCEDURE — 6360000002 HC RX W HCPCS: Performed by: CLINICAL NURSE SPECIALIST

## 2019-02-03 RX ORDER — SODIUM CHLORIDE 0.9 % (FLUSH) 0.9 %
10 SYRINGE (ML) INJECTION PRN
Status: CANCELLED | OUTPATIENT
Start: 2019-02-03

## 2019-02-03 RX ORDER — HEPARIN SODIUM (PORCINE) LOCK FLUSH IV SOLN 100 UNIT/ML 100 UNIT/ML
300 SOLUTION INTRAVENOUS PRN
Status: CANCELLED | OUTPATIENT
Start: 2019-02-03

## 2019-02-03 RX ORDER — HEPARIN SODIUM (PORCINE) LOCK FLUSH IV SOLN 100 UNIT/ML 100 UNIT/ML
300 SOLUTION INTRAVENOUS PRN
Status: DISCONTINUED | OUTPATIENT
Start: 2019-02-03 | End: 2019-02-04 | Stop reason: HOSPADM

## 2019-02-03 RX ORDER — SODIUM CHLORIDE 0.9 % (FLUSH) 0.9 %
10 SYRINGE (ML) INJECTION PRN
Status: DISCONTINUED | OUTPATIENT
Start: 2019-02-03 | End: 2019-02-04 | Stop reason: HOSPADM

## 2019-02-03 RX ADMIN — Medication 10 ML: at 08:56

## 2019-02-03 RX ADMIN — Medication 300 UNITS: at 08:57

## 2019-02-03 RX ADMIN — Medication 10 ML: at 08:27

## 2019-02-03 RX ADMIN — Medication 10 ML: at 08:57

## 2019-02-03 RX ADMIN — CEFTRIAXONE SODIUM 2 G: 2 INJECTION, POWDER, FOR SOLUTION INTRAMUSCULAR; INTRAVENOUS at 08:27

## 2019-02-04 ENCOUNTER — HOSPITAL ENCOUNTER (OUTPATIENT)
Dept: INFUSION THERAPY | Age: 69
Setting detail: INFUSION SERIES
Discharge: HOME | End: 2019-02-04
Payer: MEDICARE

## 2019-02-04 VITALS
TEMPERATURE: 98 F | HEIGHT: 70 IN | BODY MASS INDEX: 21.62 KG/M2 | RESPIRATION RATE: 16 BRPM | OXYGEN SATURATION: 97 % | WEIGHT: 151 LBS | HEART RATE: 87 BPM | DIASTOLIC BLOOD PRESSURE: 57 MMHG | SYSTOLIC BLOOD PRESSURE: 121 MMHG

## 2019-02-04 DIAGNOSIS — A69.20 LYME DISEASE: ICD-10-CM

## 2019-02-04 LAB
ALBUMIN SERPL-MCNC: 2.9 G/DL (ref 3.5–5.2)
ALP BLD-CCNC: 83 U/L (ref 40–129)
ALT SERPL-CCNC: 12 U/L (ref 0–40)
ANION GAP SERPL CALCULATED.3IONS-SCNC: 11 MMOL/L (ref 7–16)
ANISOCYTOSIS: ABNORMAL
AST SERPL-CCNC: 30 U/L (ref 0–39)
ATYPICAL LYMPHOCYTE RELATIVE PERCENT: 1 % (ref 0–4)
BASOPHILS ABSOLUTE: 0 E9/L (ref 0–0.2)
BASOPHILS RELATIVE PERCENT: 0 % (ref 0–2)
BILIRUB SERPL-MCNC: 0.2 MG/DL (ref 0–1.2)
BUN BLDV-MCNC: 20 MG/DL (ref 8–23)
C-REACTIVE PROTEIN: 2.5 MG/DL (ref 0–0.4)
CALCIUM SERPL-MCNC: 8.2 MG/DL (ref 8.6–10.2)
CHLORIDE BLD-SCNC: 103 MMOL/L (ref 98–107)
CO2: 23 MMOL/L (ref 22–29)
CREAT SERPL-MCNC: 0.8 MG/DL (ref 0.7–1.2)
EOSINOPHILS ABSOLUTE: 0.12 E9/L (ref 0.05–0.5)
EOSINOPHILS RELATIVE PERCENT: 3 % (ref 0–6)
GFR AFRICAN AMERICAN: >60
GFR NON-AFRICAN AMERICAN: >60 ML/MIN/1.73
GLUCOSE BLD-MCNC: 108 MG/DL (ref 74–99)
HCT VFR BLD CALC: 26.4 % (ref 37–54)
HEMOGLOBIN: 8.7 G/DL (ref 12.5–16.5)
LYMPHOCYTES ABSOLUTE: 1.32 E9/L (ref 1.5–4)
LYMPHOCYTES RELATIVE PERCENT: 32 % (ref 20–42)
MCH RBC QN AUTO: 29.2 PG (ref 26–35)
MCHC RBC AUTO-ENTMCNC: 33 % (ref 32–34.5)
MCV RBC AUTO: 88.6 FL (ref 80–99.9)
MONOCYTES ABSOLUTE: 0.36 E9/L (ref 0.1–0.95)
MONOCYTES RELATIVE PERCENT: 9 % (ref 2–12)
NEUTROPHILS ABSOLUTE: 2.2 E9/L (ref 1.8–7.3)
NEUTROPHILS RELATIVE PERCENT: 55 % (ref 43–80)
OVALOCYTES: ABNORMAL
PDW BLD-RTO: 17.8 FL (ref 11.5–15)
PLATELET # BLD: 211 E9/L (ref 130–450)
PMV BLD AUTO: 10.4 FL (ref 7–12)
POIKILOCYTES: ABNORMAL
POLYCHROMASIA: ABNORMAL
POTASSIUM SERPL-SCNC: 4.2 MMOL/L (ref 3.5–5)
RBC # BLD: 2.98 E12/L (ref 3.8–5.8)
SEDIMENTATION RATE, ERYTHROCYTE: 49 MM/HR (ref 0–15)
SMUDGE CELLS: ABNORMAL
SODIUM BLD-SCNC: 137 MMOL/L (ref 132–146)
TOTAL PROTEIN: 5.9 G/DL (ref 6.4–8.3)
WBC # BLD: 4 E9/L (ref 4.5–11.5)

## 2019-02-04 PROCEDURE — 96365 THER/PROPH/DIAG IV INF INIT: CPT

## 2019-02-04 PROCEDURE — 80053 COMPREHEN METABOLIC PANEL: CPT

## 2019-02-04 PROCEDURE — 6360000002 HC RX W HCPCS: Performed by: CLINICAL NURSE SPECIALIST

## 2019-02-04 PROCEDURE — 85651 RBC SED RATE NONAUTOMATED: CPT

## 2019-02-04 PROCEDURE — 85025 COMPLETE CBC W/AUTO DIFF WBC: CPT

## 2019-02-04 PROCEDURE — 2580000003 HC RX 258: Performed by: CLINICAL NURSE SPECIALIST

## 2019-02-04 PROCEDURE — 36415 COLL VENOUS BLD VENIPUNCTURE: CPT

## 2019-02-04 PROCEDURE — 86140 C-REACTIVE PROTEIN: CPT

## 2019-02-04 RX ORDER — SODIUM CHLORIDE 0.9 % (FLUSH) 0.9 %
10 SYRINGE (ML) INJECTION PRN
Status: DISCONTINUED | OUTPATIENT
Start: 2019-02-04 | End: 2019-02-05 | Stop reason: HOSPADM

## 2019-02-04 RX ORDER — HEPARIN SODIUM (PORCINE) LOCK FLUSH IV SOLN 100 UNIT/ML 100 UNIT/ML
300 SOLUTION INTRAVENOUS PRN
Status: CANCELLED | OUTPATIENT
Start: 2019-02-04

## 2019-02-04 RX ORDER — SODIUM CHLORIDE 0.9 % (FLUSH) 0.9 %
10 SYRINGE (ML) INJECTION PRN
Status: CANCELLED | OUTPATIENT
Start: 2019-02-04

## 2019-02-04 RX ORDER — HEPARIN SODIUM (PORCINE) LOCK FLUSH IV SOLN 100 UNIT/ML 100 UNIT/ML
300 SOLUTION INTRAVENOUS PRN
Status: DISCONTINUED | OUTPATIENT
Start: 2019-02-04 | End: 2019-02-05 | Stop reason: HOSPADM

## 2019-02-04 RX ADMIN — Medication 300 UNITS: at 14:30

## 2019-02-04 RX ADMIN — Medication 10 ML: at 13:55

## 2019-02-04 RX ADMIN — CEFTRIAXONE SODIUM 2 G: 2 INJECTION, POWDER, FOR SOLUTION INTRAMUSCULAR; INTRAVENOUS at 13:57

## 2019-02-04 RX ADMIN — Medication 10 ML: at 14:29

## 2019-02-04 RX ADMIN — Medication 10 ML: at 14:30

## 2019-02-04 RX ADMIN — Medication 10 ML: at 13:57

## 2019-02-05 ENCOUNTER — HOSPITAL ENCOUNTER (OUTPATIENT)
Dept: INFUSION THERAPY | Age: 69
Setting detail: INFUSION SERIES
Discharge: HOME | End: 2019-02-05
Payer: MEDICARE

## 2019-02-05 VITALS
BODY MASS INDEX: 21.62 KG/M2 | RESPIRATION RATE: 16 BRPM | HEART RATE: 84 BPM | HEIGHT: 70 IN | WEIGHT: 151 LBS | OXYGEN SATURATION: 100 % | DIASTOLIC BLOOD PRESSURE: 59 MMHG | SYSTOLIC BLOOD PRESSURE: 118 MMHG | TEMPERATURE: 97.8 F

## 2019-02-05 DIAGNOSIS — A69.20 LYME DISEASE: ICD-10-CM

## 2019-02-05 PROCEDURE — 6360000002 HC RX W HCPCS: Performed by: CLINICAL NURSE SPECIALIST

## 2019-02-05 PROCEDURE — 2580000003 HC RX 258: Performed by: CLINICAL NURSE SPECIALIST

## 2019-02-05 PROCEDURE — 96365 THER/PROPH/DIAG IV INF INIT: CPT

## 2019-02-05 RX ORDER — SODIUM CHLORIDE 0.9 % (FLUSH) 0.9 %
10 SYRINGE (ML) INJECTION PRN
Status: CANCELLED | OUTPATIENT
Start: 2019-02-05

## 2019-02-05 RX ORDER — HEPARIN SODIUM (PORCINE) LOCK FLUSH IV SOLN 100 UNIT/ML 100 UNIT/ML
300 SOLUTION INTRAVENOUS PRN
Status: CANCELLED | OUTPATIENT
Start: 2019-02-05

## 2019-02-05 RX ORDER — HEPARIN SODIUM (PORCINE) LOCK FLUSH IV SOLN 100 UNIT/ML 100 UNIT/ML
300 SOLUTION INTRAVENOUS PRN
Status: DISCONTINUED | OUTPATIENT
Start: 2019-02-05 | End: 2019-02-06 | Stop reason: HOSPADM

## 2019-02-05 RX ORDER — SODIUM CHLORIDE 0.9 % (FLUSH) 0.9 %
10 SYRINGE (ML) INJECTION PRN
Status: DISCONTINUED | OUTPATIENT
Start: 2019-02-05 | End: 2019-02-06 | Stop reason: HOSPADM

## 2019-02-05 RX ORDER — LANOLIN ALCOHOL/MO/W.PET/CERES
325 CREAM (GRAM) TOPICAL
COMMUNITY
End: 2021-10-26

## 2019-02-05 RX ADMIN — Medication 10 ML: at 14:25

## 2019-02-05 RX ADMIN — CEFTRIAXONE 2 G: 2 INJECTION, POWDER, FOR SOLUTION INTRAMUSCULAR; INTRAVENOUS at 13:54

## 2019-02-05 RX ADMIN — Medication 300 UNITS: at 14:26

## 2019-02-05 RX ADMIN — Medication 10 ML: at 14:26

## 2019-02-05 RX ADMIN — Medication 10 ML: at 13:54

## 2019-02-05 NOTE — PROGRESS NOTES
Lab results faxed to [facility] and Dr Froylan Hedrick office(per pt request)

## 2019-02-06 ENCOUNTER — HOSPITAL ENCOUNTER (OUTPATIENT)
Dept: INFUSION THERAPY | Age: 69
Setting detail: INFUSION SERIES
Discharge: HOME | End: 2019-02-06
Payer: MEDICARE

## 2019-02-06 VITALS
OXYGEN SATURATION: 99 % | TEMPERATURE: 97.8 F | BODY MASS INDEX: 21.62 KG/M2 | WEIGHT: 151 LBS | HEART RATE: 85 BPM | HEIGHT: 70 IN | RESPIRATION RATE: 16 BRPM | SYSTOLIC BLOOD PRESSURE: 112 MMHG | DIASTOLIC BLOOD PRESSURE: 56 MMHG

## 2019-02-06 DIAGNOSIS — A69.20 LYME DISEASE: ICD-10-CM

## 2019-02-06 PROCEDURE — 96365 THER/PROPH/DIAG IV INF INIT: CPT

## 2019-02-06 PROCEDURE — 2580000003 HC RX 258: Performed by: CLINICAL NURSE SPECIALIST

## 2019-02-06 PROCEDURE — 6360000002 HC RX W HCPCS: Performed by: CLINICAL NURSE SPECIALIST

## 2019-02-06 RX ORDER — HEPARIN SODIUM (PORCINE) LOCK FLUSH IV SOLN 100 UNIT/ML 100 UNIT/ML
300 SOLUTION INTRAVENOUS PRN
Status: CANCELLED | OUTPATIENT
Start: 2019-02-06

## 2019-02-06 RX ORDER — SODIUM CHLORIDE 0.9 % (FLUSH) 0.9 %
10 SYRINGE (ML) INJECTION PRN
Status: DISCONTINUED | OUTPATIENT
Start: 2019-02-06 | End: 2019-02-07 | Stop reason: HOSPADM

## 2019-02-06 RX ORDER — SODIUM CHLORIDE 0.9 % (FLUSH) 0.9 %
10 SYRINGE (ML) INJECTION PRN
Status: CANCELLED | OUTPATIENT
Start: 2019-02-06

## 2019-02-06 RX ORDER — HEPARIN SODIUM (PORCINE) LOCK FLUSH IV SOLN 100 UNIT/ML 100 UNIT/ML
300 SOLUTION INTRAVENOUS PRN
Status: DISCONTINUED | OUTPATIENT
Start: 2019-02-06 | End: 2019-02-07 | Stop reason: HOSPADM

## 2019-02-06 RX ADMIN — Medication 10 ML: at 14:25

## 2019-02-06 RX ADMIN — CEFTRIAXONE 2 G: 2 INJECTION, POWDER, FOR SOLUTION INTRAMUSCULAR; INTRAVENOUS at 13:54

## 2019-02-06 RX ADMIN — Medication 10 ML: at 13:54

## 2019-02-06 RX ADMIN — Medication 10 ML: at 14:26

## 2019-02-06 RX ADMIN — Medication 300 UNITS: at 14:27

## 2019-02-06 ASSESSMENT — PAIN DESCRIPTION - PROGRESSION: CLINICAL_PROGRESSION: NOT CHANGED

## 2019-02-06 ASSESSMENT — PAIN SCALES - GENERAL: PAINLEVEL_OUTOF10: 0

## 2019-02-07 ENCOUNTER — HOSPITAL ENCOUNTER (OUTPATIENT)
Dept: INFUSION THERAPY | Age: 69
Setting detail: INFUSION SERIES
Discharge: HOME | End: 2019-02-07
Payer: MEDICARE

## 2019-02-07 VITALS
BODY MASS INDEX: 21.67 KG/M2 | HEART RATE: 87 BPM | WEIGHT: 151 LBS | RESPIRATION RATE: 16 BRPM | TEMPERATURE: 98.3 F | DIASTOLIC BLOOD PRESSURE: 54 MMHG | SYSTOLIC BLOOD PRESSURE: 109 MMHG | OXYGEN SATURATION: 100 %

## 2019-02-07 DIAGNOSIS — A69.20 LYME DISEASE: ICD-10-CM

## 2019-02-07 LAB
ALBUMIN SERPL-MCNC: 2.9 G/DL (ref 3.5–5.2)
ALP BLD-CCNC: 85 U/L (ref 40–129)
ALT SERPL-CCNC: 15 U/L (ref 0–40)
ANION GAP SERPL CALCULATED.3IONS-SCNC: 8 MMOL/L (ref 7–16)
ANISOCYTOSIS: ABNORMAL
AST SERPL-CCNC: 34 U/L (ref 0–39)
BASOPHILS ABSOLUTE: 0 E9/L (ref 0–0.2)
BASOPHILS RELATIVE PERCENT: 0 % (ref 0–2)
BILIRUB SERPL-MCNC: 0.3 MG/DL (ref 0–1.2)
BUN BLDV-MCNC: 19 MG/DL (ref 8–23)
CALCIUM SERPL-MCNC: 8.4 MG/DL (ref 8.6–10.2)
CHLORIDE BLD-SCNC: 101 MMOL/L (ref 98–107)
CO2: 24 MMOL/L (ref 22–29)
CREAT SERPL-MCNC: 0.9 MG/DL (ref 0.7–1.2)
EOSINOPHILS ABSOLUTE: 0.04 E9/L (ref 0.05–0.5)
EOSINOPHILS RELATIVE PERCENT: 1 % (ref 0–6)
GFR AFRICAN AMERICAN: >60
GFR NON-AFRICAN AMERICAN: >60 ML/MIN/1.73
GLUCOSE BLD-MCNC: 102 MG/DL (ref 74–99)
HCT VFR BLD CALC: 28.3 % (ref 37–54)
HEMOGLOBIN: 9 G/DL (ref 12.5–16.5)
LYMPHOCYTES ABSOLUTE: 0.97 E9/L (ref 1.5–4)
LYMPHOCYTES RELATIVE PERCENT: 27 % (ref 20–42)
MCH RBC QN AUTO: 28.3 PG (ref 26–35)
MCHC RBC AUTO-ENTMCNC: 31.8 % (ref 32–34.5)
MCV RBC AUTO: 89 FL (ref 80–99.9)
METAMYELOCYTES RELATIVE PERCENT: 1 % (ref 0–1)
MONOCYTES ABSOLUTE: 0.29 E9/L (ref 0.1–0.95)
MONOCYTES RELATIVE PERCENT: 8 % (ref 2–12)
MYELOCYTE PERCENT: 3 % (ref 0–0)
NEUTROPHILS ABSOLUTE: 2.3 E9/L (ref 1.8–7.3)
NEUTROPHILS RELATIVE PERCENT: 60 % (ref 43–80)
OVALOCYTES: ABNORMAL
PDW BLD-RTO: 17.8 FL (ref 11.5–15)
PLATELET # BLD: 196 E9/L (ref 130–450)
PMV BLD AUTO: 10.7 FL (ref 7–12)
POIKILOCYTES: ABNORMAL
POTASSIUM SERPL-SCNC: 4.2 MMOL/L (ref 3.5–5)
RBC # BLD: 3.18 E12/L (ref 3.8–5.8)
SODIUM BLD-SCNC: 133 MMOL/L (ref 132–146)
TOTAL PROTEIN: 6.1 G/DL (ref 6.4–8.3)
WBC # BLD: 3.6 E9/L (ref 4.5–11.5)

## 2019-02-07 PROCEDURE — 96365 THER/PROPH/DIAG IV INF INIT: CPT

## 2019-02-07 PROCEDURE — 80053 COMPREHEN METABOLIC PANEL: CPT

## 2019-02-07 PROCEDURE — 36415 COLL VENOUS BLD VENIPUNCTURE: CPT

## 2019-02-07 PROCEDURE — 6360000002 HC RX W HCPCS: Performed by: CLINICAL NURSE SPECIALIST

## 2019-02-07 PROCEDURE — 85025 COMPLETE CBC W/AUTO DIFF WBC: CPT

## 2019-02-07 PROCEDURE — 2580000003 HC RX 258: Performed by: CLINICAL NURSE SPECIALIST

## 2019-02-07 RX ORDER — SODIUM CHLORIDE 0.9 % (FLUSH) 0.9 %
10 SYRINGE (ML) INJECTION PRN
Status: DISCONTINUED | OUTPATIENT
Start: 2019-02-07 | End: 2019-02-08 | Stop reason: HOSPADM

## 2019-02-07 RX ORDER — SODIUM CHLORIDE 0.9 % (FLUSH) 0.9 %
10 SYRINGE (ML) INJECTION PRN
Status: CANCELLED | OUTPATIENT
Start: 2019-02-07

## 2019-02-07 RX ORDER — HEPARIN SODIUM (PORCINE) LOCK FLUSH IV SOLN 100 UNIT/ML 100 UNIT/ML
300 SOLUTION INTRAVENOUS PRN
Status: CANCELLED | OUTPATIENT
Start: 2019-02-07

## 2019-02-07 RX ORDER — HEPARIN SODIUM (PORCINE) LOCK FLUSH IV SOLN 100 UNIT/ML 100 UNIT/ML
300 SOLUTION INTRAVENOUS PRN
Status: DISCONTINUED | OUTPATIENT
Start: 2019-02-07 | End: 2019-02-08 | Stop reason: HOSPADM

## 2019-02-07 RX ADMIN — Medication 10 ML: at 14:27

## 2019-02-07 RX ADMIN — CEFTRIAXONE 2 G: 2 INJECTION, POWDER, FOR SOLUTION INTRAMUSCULAR; INTRAVENOUS at 13:56

## 2019-02-07 RX ADMIN — Medication 10 ML: at 13:55

## 2019-02-07 RX ADMIN — Medication 10 ML: at 13:53

## 2019-02-07 RX ADMIN — Medication 300 UNITS: at 14:28

## 2019-02-07 RX ADMIN — Medication 10 ML: at 14:28

## 2019-02-08 ENCOUNTER — HOSPITAL ENCOUNTER (OUTPATIENT)
Dept: INFUSION THERAPY | Age: 69
Setting detail: INFUSION SERIES
Discharge: HOME | End: 2019-02-08
Payer: MEDICARE

## 2019-02-08 VITALS
WEIGHT: 151 LBS | BODY MASS INDEX: 21.62 KG/M2 | TEMPERATURE: 97.3 F | RESPIRATION RATE: 16 BRPM | HEIGHT: 70 IN | SYSTOLIC BLOOD PRESSURE: 111 MMHG | HEART RATE: 75 BPM | DIASTOLIC BLOOD PRESSURE: 56 MMHG

## 2019-02-08 DIAGNOSIS — A69.20 LYME DISEASE: ICD-10-CM

## 2019-02-08 PROCEDURE — 96365 THER/PROPH/DIAG IV INF INIT: CPT

## 2019-02-08 PROCEDURE — 6360000002 HC RX W HCPCS: Performed by: CLINICAL NURSE SPECIALIST

## 2019-02-08 PROCEDURE — 2580000003 HC RX 258: Performed by: CLINICAL NURSE SPECIALIST

## 2019-02-08 RX ORDER — HEPARIN SODIUM (PORCINE) LOCK FLUSH IV SOLN 100 UNIT/ML 100 UNIT/ML
300 SOLUTION INTRAVENOUS PRN
Status: CANCELLED | OUTPATIENT
Start: 2019-02-08

## 2019-02-08 RX ORDER — HEPARIN SODIUM (PORCINE) LOCK FLUSH IV SOLN 100 UNIT/ML 100 UNIT/ML
300 SOLUTION INTRAVENOUS PRN
Status: DISCONTINUED | OUTPATIENT
Start: 2019-02-08 | End: 2019-02-09 | Stop reason: HOSPADM

## 2019-02-08 RX ORDER — SODIUM CHLORIDE 0.9 % (FLUSH) 0.9 %
10 SYRINGE (ML) INJECTION PRN
Status: CANCELLED | OUTPATIENT
Start: 2019-02-09

## 2019-02-08 RX ORDER — SODIUM CHLORIDE 0.9 % (FLUSH) 0.9 %
10 SYRINGE (ML) INJECTION PRN
Status: DISCONTINUED | OUTPATIENT
Start: 2019-02-08 | End: 2019-02-09 | Stop reason: HOSPADM

## 2019-02-08 RX ORDER — HEPARIN SODIUM (PORCINE) LOCK FLUSH IV SOLN 100 UNIT/ML 100 UNIT/ML
300 SOLUTION INTRAVENOUS PRN
Status: CANCELLED | OUTPATIENT
Start: 2019-02-09

## 2019-02-08 RX ORDER — SODIUM CHLORIDE 0.9 % (FLUSH) 0.9 %
10 SYRINGE (ML) INJECTION PRN
Status: CANCELLED | OUTPATIENT
Start: 2019-02-08

## 2019-02-08 RX ADMIN — CEFTRIAXONE 2 G: 2 INJECTION, POWDER, FOR SOLUTION INTRAMUSCULAR; INTRAVENOUS at 14:00

## 2019-02-08 RX ADMIN — Medication 10 ML: at 14:32

## 2019-02-08 RX ADMIN — Medication 10 ML: at 14:00

## 2019-02-08 RX ADMIN — Medication 10 ML: at 14:31

## 2019-02-08 RX ADMIN — Medication 300 UNITS: at 14:32

## 2019-02-09 ENCOUNTER — HOSPITAL ENCOUNTER (OUTPATIENT)
Dept: INFUSION THERAPY | Age: 69
Setting detail: INFUSION SERIES
Discharge: HOME | End: 2019-02-09
Payer: MEDICARE

## 2019-02-09 VITALS
BODY MASS INDEX: 21.62 KG/M2 | HEIGHT: 70 IN | HEART RATE: 86 BPM | DIASTOLIC BLOOD PRESSURE: 57 MMHG | TEMPERATURE: 98.8 F | SYSTOLIC BLOOD PRESSURE: 122 MMHG | RESPIRATION RATE: 16 BRPM | OXYGEN SATURATION: 100 % | WEIGHT: 151 LBS

## 2019-02-09 DIAGNOSIS — A69.20 LYME DISEASE: ICD-10-CM

## 2019-02-09 PROCEDURE — 6360000002 HC RX W HCPCS: Performed by: SPECIALIST

## 2019-02-09 PROCEDURE — 2580000003 HC RX 258: Performed by: SPECIALIST

## 2019-02-09 PROCEDURE — 96365 THER/PROPH/DIAG IV INF INIT: CPT

## 2019-02-09 RX ORDER — SODIUM CHLORIDE 0.9 % (FLUSH) 0.9 %
10 SYRINGE (ML) INJECTION PRN
Status: DISCONTINUED | OUTPATIENT
Start: 2019-02-09 | End: 2019-02-10 | Stop reason: HOSPADM

## 2019-02-09 RX ORDER — HEPARIN SODIUM (PORCINE) LOCK FLUSH IV SOLN 100 UNIT/ML 100 UNIT/ML
300 SOLUTION INTRAVENOUS PRN
Status: DISCONTINUED | OUTPATIENT
Start: 2019-02-09 | End: 2019-02-10 | Stop reason: HOSPADM

## 2019-02-09 RX ORDER — DIPHENHYDRAMINE HYDROCHLORIDE 50 MG/ML
50 INJECTION INTRAMUSCULAR; INTRAVENOUS DAILY
Status: DISCONTINUED | OUTPATIENT
Start: 2019-02-10 | End: 2019-02-10 | Stop reason: HOSPADM

## 2019-02-09 RX ORDER — HEPARIN SODIUM (PORCINE) LOCK FLUSH IV SOLN 100 UNIT/ML 100 UNIT/ML
300 SOLUTION INTRAVENOUS PRN
Status: CANCELLED | OUTPATIENT
Start: 2019-02-09

## 2019-02-09 RX ORDER — SODIUM CHLORIDE 0.9 % (FLUSH) 0.9 %
10 SYRINGE (ML) INJECTION PRN
Status: CANCELLED | OUTPATIENT
Start: 2019-02-09

## 2019-02-09 RX ADMIN — Medication 300 UNITS: at 09:38

## 2019-02-09 RX ADMIN — Medication 10 ML: at 09:37

## 2019-02-09 RX ADMIN — Medication 10 ML: at 09:05

## 2019-02-09 RX ADMIN — Medication 10 ML: at 09:38

## 2019-02-09 RX ADMIN — CEFTRIAXONE 2 G: 2 INJECTION, POWDER, FOR SOLUTION INTRAMUSCULAR; INTRAVENOUS at 09:05

## 2019-02-09 NOTE — PROGRESS NOTES
Patient face noticeably flushed and puffy before infusion. States it has been progressively getting worse but it has been gradual and seems  worse at certain times. Denies itching or discomfort. But complains of generalized swelling.skin dry and flaky and red   States this has been ongoing since treatment. States he felt slight tingling sensation in face during infusion and felt face flushing. Dr Yolanda Shane notified and orders obtained. Patient and wife notified of order.

## 2019-02-10 ENCOUNTER — HOSPITAL ENCOUNTER (OUTPATIENT)
Dept: INFUSION THERAPY | Age: 69
Setting detail: INFUSION SERIES
Discharge: HOME | End: 2019-02-10
Payer: MEDICARE

## 2019-02-10 VITALS
SYSTOLIC BLOOD PRESSURE: 105 MMHG | DIASTOLIC BLOOD PRESSURE: 53 MMHG | WEIGHT: 151 LBS | HEIGHT: 70 IN | HEART RATE: 85 BPM | OXYGEN SATURATION: 100 % | RESPIRATION RATE: 16 BRPM | TEMPERATURE: 98.6 F | BODY MASS INDEX: 21.62 KG/M2

## 2019-02-10 DIAGNOSIS — A69.20 LYME DISEASE: ICD-10-CM

## 2019-02-10 PROCEDURE — 2580000003 HC RX 258: Performed by: SPECIALIST

## 2019-02-10 PROCEDURE — 96375 TX/PRO/DX INJ NEW DRUG ADDON: CPT

## 2019-02-10 PROCEDURE — 96365 THER/PROPH/DIAG IV INF INIT: CPT

## 2019-02-10 PROCEDURE — 6360000002 HC RX W HCPCS: Performed by: SPECIALIST

## 2019-02-10 RX ORDER — DIPHENHYDRAMINE HYDROCHLORIDE 50 MG/ML
50 INJECTION INTRAMUSCULAR; INTRAVENOUS ONCE
Status: COMPLETED | OUTPATIENT
Start: 2019-02-10 | End: 2019-02-10

## 2019-02-10 RX ORDER — SODIUM CHLORIDE 0.9 % (FLUSH) 0.9 %
10 SYRINGE (ML) INJECTION PRN
Status: CANCELLED | OUTPATIENT
Start: 2019-02-10

## 2019-02-10 RX ORDER — HEPARIN SODIUM (PORCINE) LOCK FLUSH IV SOLN 100 UNIT/ML 100 UNIT/ML
300 SOLUTION INTRAVENOUS PRN
Status: CANCELLED | OUTPATIENT
Start: 2019-02-10

## 2019-02-10 RX ORDER — SODIUM CHLORIDE 0.9 % (FLUSH) 0.9 %
10 SYRINGE (ML) INJECTION PRN
Status: DISCONTINUED | OUTPATIENT
Start: 2019-02-10 | End: 2019-02-11 | Stop reason: HOSPADM

## 2019-02-10 RX ORDER — HEPARIN SODIUM (PORCINE) LOCK FLUSH IV SOLN 100 UNIT/ML 100 UNIT/ML
300 SOLUTION INTRAVENOUS PRN
Status: DISCONTINUED | OUTPATIENT
Start: 2019-02-10 | End: 2019-02-11 | Stop reason: HOSPADM

## 2019-02-10 RX ADMIN — Medication 10 ML: at 09:41

## 2019-02-10 RX ADMIN — CEFTRIAXONE 2 G: 2 INJECTION, POWDER, FOR SOLUTION INTRAMUSCULAR; INTRAVENOUS at 09:06

## 2019-02-10 RX ADMIN — Medication 300 UNITS: at 09:42

## 2019-02-10 RX ADMIN — Medication 10 ML: at 09:06

## 2019-02-10 RX ADMIN — DIPHENHYDRAMINE HYDROCHLORIDE 50 MG: 50 INJECTION, SOLUTION INTRAMUSCULAR; INTRAVENOUS at 09:05

## 2019-02-10 RX ADMIN — Medication 10 ML: at 09:40

## 2019-02-10 RX ADMIN — Medication 10 ML: at 09:04

## 2019-02-10 NOTE — PROGRESS NOTES
Tolerated infusion well. Therapy plan reviewed with patient. Verbalizes understanding. Reviewed AVS with patient, reviewed medication information, verbalizes good knowledge of current plan, and has no signs or symptoms to report at this time. Pt has no tingling, flushing, puffiness of face today. Declines copy of AVS. Patient instructed on s/s infection/complication with PICC line to report and instructed on keeping PICC dressing clean, dry and intact patient verbalizes understanding. Next appointment scheduled.   Nora Bell RN  2/10/2019  9:45 AM

## 2019-02-11 ENCOUNTER — HOSPITAL ENCOUNTER (OUTPATIENT)
Dept: INFUSION THERAPY | Age: 69
Setting detail: INFUSION SERIES
Discharge: HOME | End: 2019-02-11
Payer: MEDICARE

## 2019-02-11 VITALS
DIASTOLIC BLOOD PRESSURE: 55 MMHG | RESPIRATION RATE: 16 BRPM | BODY MASS INDEX: 21.67 KG/M2 | OXYGEN SATURATION: 96 % | TEMPERATURE: 98.6 F | SYSTOLIC BLOOD PRESSURE: 106 MMHG | WEIGHT: 151 LBS | HEART RATE: 86 BPM

## 2019-02-11 DIAGNOSIS — A69.20 LYME DISEASE: ICD-10-CM

## 2019-02-11 PROCEDURE — 99211 OFF/OP EST MAY X REQ PHY/QHP: CPT

## 2019-02-11 PROCEDURE — 2580000003 HC RX 258: Performed by: SPECIALIST

## 2019-02-11 RX ORDER — DIPHENHYDRAMINE HYDROCHLORIDE 50 MG/ML
50 INJECTION INTRAMUSCULAR; INTRAVENOUS ONCE
Status: CANCELLED
Start: 2019-02-11 | End: 2019-02-11

## 2019-02-11 RX ORDER — HEPARIN SODIUM (PORCINE) LOCK FLUSH IV SOLN 100 UNIT/ML 100 UNIT/ML
300 SOLUTION INTRAVENOUS PRN
Status: CANCELLED | OUTPATIENT
Start: 2019-02-11

## 2019-02-11 RX ORDER — HEPARIN SODIUM (PORCINE) LOCK FLUSH IV SOLN 100 UNIT/ML 100 UNIT/ML
300 SOLUTION INTRAVENOUS PRN
Status: DISCONTINUED | OUTPATIENT
Start: 2019-02-11 | End: 2019-02-12 | Stop reason: HOSPADM

## 2019-02-11 RX ORDER — SODIUM CHLORIDE 0.9 % (FLUSH) 0.9 %
10 SYRINGE (ML) INJECTION PRN
Status: CANCELLED | OUTPATIENT
Start: 2019-02-11

## 2019-02-11 RX ORDER — SODIUM CHLORIDE 0.9 % (FLUSH) 0.9 %
10 SYRINGE (ML) INJECTION PRN
Status: DISCONTINUED | OUTPATIENT
Start: 2019-02-11 | End: 2019-02-12 | Stop reason: HOSPADM

## 2019-02-11 RX ORDER — DIPHENHYDRAMINE HYDROCHLORIDE 50 MG/ML
50 INJECTION INTRAMUSCULAR; INTRAVENOUS ONCE
Status: DISCONTINUED | OUTPATIENT
Start: 2019-02-11 | End: 2019-02-12 | Stop reason: HOSPADM

## 2019-02-11 RX ADMIN — Medication 10 ML: at 14:28

## 2019-02-11 ASSESSMENT — PAIN SCALES - GENERAL: PAINLEVEL_OUTOF10: 0

## 2019-02-11 ASSESSMENT — PAIN DESCRIPTION - ORIENTATION: ORIENTATION: LEFT;RIGHT

## 2019-02-11 ASSESSMENT — PAIN DESCRIPTION - LOCATION: LOCATION: FINGER (COMMENT WHICH ONE)

## 2019-02-11 ASSESSMENT — PAIN DESCRIPTION - PROGRESSION: CLINICAL_PROGRESSION: NOT CHANGED

## 2019-02-11 NOTE — PROGRESS NOTES
A peripherally-inserted central catheter is often called a PICC. It is a long, thin, flexible intravenous (I.V.) line or catheter. The catheter is placed into a small vein in your upper arm. It is moved forward until it is in a larger vein near your heart. INSTRUCTIONS AFTER PICC REMOVED     After the 24 hours is up, the dressing may be removed. The PICC insertion site is very small. A small scab may develop over the insertion site. It is okay to wash the site gently with soap and water. Be careful to not remove or pick the scab off. After washing, gently pat the site dry. You do not need to put another dressing over the insertion site after you wash it. Avoid heavy, strenuous physical activity for 24 hours after the PICC is removed. This includes things like:   Weight lifting. Strenuous yard work. Any physical activity with repetitive arm movement. ... When do I need to call the doctor? Signs of infection. These include a fever of 100.4°F (38°C) or higher, chills. Signs of infection where PICC was put in. These include swelling, redness, warmth around the site, too much pain when touched, yellowish or greenish or bloody discharge, foul smell coming from the site. Swelling and pain in the arm where the PICC was put in. This might mean there is a blood clot in your arm. Bleeding that won't stop from the insertion site. Apply pressure, elevate arm above the level of the heart. If bleeding does not stop call you physician or go to the nearest Emergency Room.

## 2020-09-23 NOTE — PROGRESS NOTES
Tolerated infusion well. Therapy plan reviewed with patient. Verbalizes understanding. Reviewed AVS with patient, reviewed medication information, verbalizes good knowledge of current plan, and has no signs or symptoms to report at this time. Declines copy of AVS. Patient instructed on s/s infection/complication with PICC line to report and instructed on keeping PICC dressing clean, dry and intact patient verbalizes understanding. Next appointment scheduled. yes

## 2021-04-08 ENCOUNTER — HOSPITAL ENCOUNTER (INPATIENT)
Dept: HOSPITAL 54 - GPS | Age: 71
LOS: 8 days | Discharge: NURSING, REHAB OR LONG-TERM CARE | DRG: 885 | End: 2021-04-16
Attending: INTERNAL MEDICINE | Admitting: PSYCHIATRY & NEUROLOGY
Payer: MEDICARE

## 2021-04-08 VITALS — BODY MASS INDEX: 20.29 KG/M2 | HEIGHT: 69 IN | WEIGHT: 137 LBS
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
## 2021-04-08 DIAGNOSIS — G31.84: ICD-10-CM

## 2021-04-08 DIAGNOSIS — Z86.711: ICD-10-CM

## 2021-04-08 DIAGNOSIS — F20.9: Primary | ICD-10-CM

## 2021-04-08 DIAGNOSIS — F39: ICD-10-CM

## 2021-04-08 DIAGNOSIS — F41.9: ICD-10-CM

## 2021-04-08 DIAGNOSIS — Z79.01: ICD-10-CM

## 2021-04-08 DIAGNOSIS — D72.829: ICD-10-CM

## 2021-04-08 DIAGNOSIS — F29: ICD-10-CM

## 2021-04-08 DIAGNOSIS — F17.200: ICD-10-CM

## 2021-04-08 DIAGNOSIS — Z73.6: ICD-10-CM

## 2021-04-08 DIAGNOSIS — F10.10: ICD-10-CM

## 2021-04-08 DIAGNOSIS — D53.9: ICD-10-CM

## 2021-04-08 DIAGNOSIS — E51.2: ICD-10-CM

## 2021-04-08 NOTE — NUR
GPS ADMISSION NOTE, RECEIVED PATIENT FROM [facility]. PATIENT 
ARRIVED ON THIS UNIT AT 2215 VIA WHEELCHAIR WITH 1 ESCORT.  PATIENT ADMITTED ON A 5150 HOLD 
FOR GD.  PER HOLD PATIENT HAS NOT SHOWERED OR BATHED IN ONE YEAR PER HIS WIFE.  PATIENT 
APPEARS SKINNY AND WITHOUT ASSISTANCE WOULD NOT BE ABLE TO CARE FOR HIMSELF.  PATIENT HAS NO 
VIABLE PLAN FOR SELF CARE AT THIS TIME.  THE 5150 WAS REVIEWED AND THE DOCUMENTATION IN THE 
5150 HOLD APPEARS TO REFLECT THE PRESENTATION OF THE PATIENT.  UPON FACE TO FACE ASSESSMENT 
PATIENT IS NOTED TO BEING CONFUSED, DISHEVELED, DISORGANIZED, COOPERATIVE, AND NEEDS 
REDIRECTION. PATIENT IS CURRENTLY LYING IN BED AWAKE,  PATIENT HAS A COMPLAINT OF CHRONIC 
LOWER BACK PAIN AT A 3 OUT 10 ON THE PAIN SCALE.  PATIENT IS TAKING ORAL PAIN MEDICATION FOR 
THIS PAIN.  PATIENT IS DISPLAYING NO S/S OF APPARENT DISTRESS.  PATIENT BREATHING IS 
UNLABORED WITH EQUAL RISE AND FALL OF THE CHEST.  PATIENT IS ALERT AND ORIENTATED X 1 ON 
ROOM AIR.  PATIENT ASSISTED WITH TURING AND REPOSITIONING Q2HR AND PRN FOR COMFORT AND 
CIRCULATION.  PATIENT HAS NO NEEDS AT THIS TIME.   PATIENT DENIES SUICIDE IDEATIONS AND 
HOMICIDAL IDEATIONS AT THIS TIME.  PATIENT REFUSED TO SIGN ANY PAPER WORK AND THINKS THIS IS 
ALL A MISTAKE.  PATIENT ADVISED OF HIS HOLD AND PATIENT RIGHTS BOOKLET GIVEN.  PATIENT IS 
UNDER THE PSYCHIATRIC CARE OF DR. GARCIA AND THE MEDICAL CARE OF DR MARTIN.  PATIENT 
BELONGINGS WERE INVENTORIED AND CHECKED FOR CONTRABAND.  ALL CONTRABAND REMOVED AND STORED 
IN PATIENT HALLWAY LOCKER.  PATIENT ADVANCED DIRECTIVES PREFERENCE, IMMUNIZATIONS 
QUESTIONER, NECESSARY PAPERWORK COMPLETED. SKIN ASSESSMENT DONE.  PATIENT ORIENTATED TO 
ROOM, FLOOR, AND STAFF WITH ALL QUESTIONS ANSWERED.  PATIENT EDUCATED ON THE USE OF THE CALL 
BELL.  PATIENT BED SIDE RAILS ARE UP X 2 FOR SAFETY.  PATIENT BED IS LOCKED, LOW AND I WILL 
CONTINUE TO MONITOR THIS PATIENT Q 15 MIN WITH THE HELP OF STAFF TO MAINTAIN SAFETY.

## 2021-04-09 VITALS — DIASTOLIC BLOOD PRESSURE: 71 MMHG | SYSTOLIC BLOOD PRESSURE: 143 MMHG

## 2021-04-09 VITALS — SYSTOLIC BLOOD PRESSURE: 129 MMHG | DIASTOLIC BLOOD PRESSURE: 70 MMHG

## 2021-04-09 VITALS — DIASTOLIC BLOOD PRESSURE: 75 MMHG | SYSTOLIC BLOOD PRESSURE: 129 MMHG

## 2021-04-09 VITALS — SYSTOLIC BLOOD PRESSURE: 139 MMHG | DIASTOLIC BLOOD PRESSURE: 67 MMHG

## 2021-04-09 LAB
BASOPHILS # BLD AUTO: 0 /CMM (ref 0–0.2)
BASOPHILS NFR BLD AUTO: 0.4 % (ref 0–2)
BUN SERPL-MCNC: 14 MG/DL (ref 7–18)
CALCIUM SERPL-MCNC: 8.9 MG/DL (ref 8.5–10.1)
CHLORIDE SERPL-SCNC: 104 MMOL/L (ref 98–107)
CHOLEST SERPL-MCNC: 172 MG/DL (ref ?–200)
CO2 SERPL-SCNC: 27 MMOL/L (ref 21–32)
CREAT SERPL-MCNC: 0.6 MG/DL (ref 0.6–1.3)
EOSINOPHIL NFR BLD AUTO: 1.5 % (ref 0–6)
GLUCOSE SERPL-MCNC: 86 MG/DL (ref 74–106)
HCT VFR BLD AUTO: 38 % (ref 39–51)
HDLC SERPL-MCNC: 71 MG/DL (ref 40–60)
HGB BLD-MCNC: 12.9 G/DL (ref 13.5–17.5)
LDLC SERPL DIRECT ASSAY-MCNC: 79 MG/DL (ref 0–99)
LYMPHOCYTES NFR BLD AUTO: 1.3 /CMM (ref 0.8–4.8)
LYMPHOCYTES NFR BLD AUTO: 13.9 % (ref 20–44)
MCHC RBC AUTO-ENTMCNC: 34 G/DL (ref 31–36)
MCV RBC AUTO: 99 FL (ref 80–96)
MONOCYTES NFR BLD AUTO: 1 /CMM (ref 0.1–1.3)
MONOCYTES NFR BLD AUTO: 11 % (ref 2–12)
NEUTROPHILS # BLD AUTO: 6.9 /CMM (ref 1.8–8.9)
NEUTROPHILS NFR BLD AUTO: 73.2 % (ref 43–81)
PLATELET # BLD AUTO: 291 /CMM (ref 150–450)
POTASSIUM SERPL-SCNC: 4.4 MMOL/L (ref 3.5–5.1)
RBC # BLD AUTO: 3.86 MIL/UL (ref 4.5–6)
SODIUM SERPL-SCNC: 140 MMOL/L (ref 136–145)
TRIGL SERPL-MCNC: 16 MG/DL (ref 30–150)
WBC NRBC COR # BLD AUTO: 9.4 K/UL (ref 4.3–11)

## 2021-04-09 RX ADMIN — LORAZEPAM PRN MG: 1 TABLET ORAL at 21:43

## 2021-04-09 RX ADMIN — HALOPERIDOL SCH MG: 5 TABLET ORAL at 23:35

## 2021-04-09 RX ADMIN — TEMAZEPAM PRN MG: 7.5 CAPSULE ORAL at 23:47

## 2021-04-09 RX ADMIN — NICOTINE SCH MG: 14 PATCH TRANSDERMAL at 09:00

## 2021-04-09 RX ADMIN — Medication SCH MG: at 12:00

## 2021-04-09 RX ADMIN — THERA TABS SCH UDTAB: TAB at 09:00

## 2021-04-09 RX ADMIN — Medication SCH ML: at 08:18

## 2021-04-09 RX ADMIN — DIVALPROEX SODIUM SCH MG: 250 TABLET, DELAYED RELEASE ORAL at 22:30

## 2021-04-09 RX ADMIN — DIVALPROEX SODIUM SCH MG: 250 TABLET, DELAYED RELEASE ORAL at 23:35

## 2021-04-09 RX ADMIN — BUDESONIDE SCH MG: 0.5 SUSPENSION RESPIRATORY (INHALATION) at 09:00

## 2021-04-09 RX ADMIN — Medication SCH ML: at 16:33

## 2021-04-09 RX ADMIN — BUDESONIDE SCH MG: 0.5 SUSPENSION RESPIRATORY (INHALATION) at 16:32

## 2021-04-09 RX ADMIN — APIXABAN SCH MG: 5 TABLET, FILM COATED ORAL at 16:32

## 2021-04-09 RX ADMIN — Medication SCH TAB: at 09:00

## 2021-04-09 RX ADMIN — APIXABAN SCH MG: 5 TABLET, FILM COATED ORAL at 09:00

## 2021-04-09 NOTE — NUR
GPS RN NOTE



PT RETURNED BACK TO BED, VERBALIZED WILL TRY TO SLEEP. REQUESTS HAT PT C/O OF HIS HEAD BEING 
COLD. HAT BELONGING TO PT GIVEN AND REMAINS ON PERSON. ALL OTHER BELONGINGS REMAIN IN PT 
PERSONAL LOCKER.

## 2021-04-09 NOTE — NUR
RN NOTE- PT STILL REMAINS IRRITABLE TAKING CLOTHING OFF AND ATTEMPTING TO GET OUT OF CHAIR. 
MONITORING

## 2021-04-09 NOTE — NUR
Family Contact: SW called the pts wife, Mendy ([phone]), and discussed how she is 
not sure at time what the discharge plan will be as the pt needs to be stabilized and it 
will depend on how the pt is at the time of discharge.

## 2021-04-09 NOTE — NUR
RN PM OPENING NOTE. 

PER REPORT FROM CARMEL BEAL.  PT COMBATIVE TODAY, NON COOPERATIVE WITH CARE. REFUSING 
MEDICATIONS. TAKING PUNCHES AT STAFF. 

AT THIS TIME PATIENT DISORIENTED SITTING IN CHAIR. QUIET GARBLED SPEECH 

WILL CONT TO MONITOR BEHAVIOR AND SAFETY PER PROTOCOL.

## 2021-04-09 NOTE — NUR
RN NOTE- PT CALMER . NOT COMBATIVE GARBLED SPEECH CONFUSED STILL ATTEMPTING TO GET OUT OF 
CHAIR . RX EFFECTIVE REDUCING AGITATION

## 2021-04-09 NOTE — NUR
GPS RN NOTE



PT BLOOD SUGAR IS 72, GAVE 8 OZ JUICE AND SNACKS GIVEN. CHARGE RN MADE AWARE OF SITUATION.

## 2021-04-09 NOTE — NUR
RN NOTE- PT DISRUPTIVE IN GISSELL CHAIR ATTEMPTING TO CLIMB OUT AND PUTTING SELF AT RISK. 
ATTEMPTED TO GIVE PO PRN RX BUT PT REFUSED. TAKING OFF GOWN, BANGING ON TRAY. PHONED DR GARCIA. HALDOL 10 MG, ATIVAN 2 MG BENADRYL 25 MG IM STAT ORDERED.

## 2021-04-09 NOTE — NUR
RN NOTE- CHANGED PT. CALM . PASSIVE. MOVED TO DAY ROOM W OTHER PTS. COVERED W BLANKET IN 
GISSELL CHAIR. SLEEPING NOW

## 2021-04-09 NOTE — NUR
Initial Discharge Plan: Pt currently resides with his wife, Mendy ([phone]), in 
their home located at [address] [place] [address]. Per pts wife, she is 
unsure at this time if the pt can return. SW will work with the pt and the MD regarding 
appropriate discharge planning. SW will form a safe and proper discharge.

## 2021-04-09 NOTE — NUR
GPS RN NOTE



PT RESTLESS IN BED, ATTEMPTS TO GET UP. PLACED IN GISSELL CHAIR IN HALLWAY. PT TALKING TO SELF, 
UNCLEAR SPEECH. WILL CONTINUE TO MONITOR FOR SAFETY AND BEHAVIOR THROUGHOUT SHIFT.

## 2021-04-09 NOTE — NUR
RN NOTE- PT AGITATED TAKING SWING AT RN TWICE. PT REFUSING BREAKFAST CARE ORMEDS. ATTEMPTED 
TO GIVE ATIVAN 0.5 MG FOR AGITATION. PT REFUSED. WENT TO WASTE ATIVAN 0.5 MG W MICHELLE SINGH RN BUT OMNICELL NWOULDN'T ALLOW WASTE FOR SOME REASON. WASTED IN PILL DESTROYER VALORIE SCHULTE RN AND MICHELLE BEAL.

## 2021-04-10 VITALS — SYSTOLIC BLOOD PRESSURE: 107 MMHG | DIASTOLIC BLOOD PRESSURE: 49 MMHG

## 2021-04-10 VITALS — SYSTOLIC BLOOD PRESSURE: 126 MMHG | DIASTOLIC BLOOD PRESSURE: 72 MMHG

## 2021-04-10 VITALS — SYSTOLIC BLOOD PRESSURE: 154 MMHG | DIASTOLIC BLOOD PRESSURE: 74 MMHG

## 2021-04-10 RX ADMIN — Medication SCH ML: at 10:26

## 2021-04-10 RX ADMIN — BENZTROPINE MESYLATE SCH MG: 1 TABLET ORAL at 10:20

## 2021-04-10 RX ADMIN — DIVALPROEX SODIUM SCH MG: 250 TABLET, DELAYED RELEASE ORAL at 10:19

## 2021-04-10 RX ADMIN — HALOPERIDOL SCH MG: 5 TABLET ORAL at 16:35

## 2021-04-10 RX ADMIN — APIXABAN SCH MG: 5 TABLET, FILM COATED ORAL at 16:35

## 2021-04-10 RX ADMIN — NICOTINE SCH MG: 14 PATCH TRANSDERMAL at 10:19

## 2021-04-10 RX ADMIN — Medication SCH ML: at 17:24

## 2021-04-10 RX ADMIN — TEMAZEPAM PRN MG: 7.5 CAPSULE ORAL at 22:23

## 2021-04-10 RX ADMIN — HALOPERIDOL SCH MG: 5 TABLET ORAL at 10:21

## 2021-04-10 RX ADMIN — DIVALPROEX SODIUM SCH MG: 250 TABLET, DELAYED RELEASE ORAL at 21:49

## 2021-04-10 RX ADMIN — Medication SCH MG: at 10:19

## 2021-04-10 RX ADMIN — APIXABAN SCH MG: 5 TABLET, FILM COATED ORAL at 10:20

## 2021-04-10 RX ADMIN — BUDESONIDE SCH MG: 0.5 SUSPENSION RESPIRATORY (INHALATION) at 17:00

## 2021-04-10 RX ADMIN — BENZTROPINE MESYLATE SCH MG: 1 TABLET ORAL at 16:34

## 2021-04-10 RX ADMIN — THERA TABS SCH UDTAB: TAB at 10:20

## 2021-04-10 RX ADMIN — Medication SCH TAB: at 10:19

## 2021-04-10 RX ADMIN — BUDESONIDE SCH MG: 0.5 SUSPENSION RESPIRATORY (INHALATION) at 12:56

## 2021-04-10 NOTE — NUR
GPS RN NOTES:

PATIENT REQUESTED SLEEP MEDICATION D/T INSOMNIA. RESTORIL 7.5MG/1CAP GIVEN PO AS ORDERED. 
WILL CONTINUE TO MONITOR.

## 2021-04-11 VITALS — DIASTOLIC BLOOD PRESSURE: 69 MMHG | SYSTOLIC BLOOD PRESSURE: 131 MMHG

## 2021-04-11 VITALS — SYSTOLIC BLOOD PRESSURE: 105 MMHG | DIASTOLIC BLOOD PRESSURE: 59 MMHG

## 2021-04-11 VITALS — SYSTOLIC BLOOD PRESSURE: 122 MMHG | DIASTOLIC BLOOD PRESSURE: 62 MMHG

## 2021-04-11 LAB
ALBUMIN SERPL BCP-MCNC: 3.8 G/DL (ref 3.4–5)
ALP SERPL-CCNC: 76 U/L (ref 46–116)
ALT SERPL W P-5'-P-CCNC: 19 U/L (ref 12–78)
AST SERPL W P-5'-P-CCNC: 31 U/L (ref 15–37)
BASOPHILS # BLD AUTO: 0 /CMM (ref 0–0.2)
BASOPHILS NFR BLD AUTO: 0.4 % (ref 0–2)
BILIRUB SERPL-MCNC: 0.5 MG/DL (ref 0.2–1)
BUN SERPL-MCNC: 21 MG/DL (ref 7–18)
CALCIUM SERPL-MCNC: 9.8 MG/DL (ref 8.5–10.1)
CHLORIDE SERPL-SCNC: 102 MMOL/L (ref 98–107)
CO2 SERPL-SCNC: 28 MMOL/L (ref 21–32)
CREAT SERPL-MCNC: 0.7 MG/DL (ref 0.6–1.3)
EOSINOPHIL NFR BLD AUTO: 0.8 % (ref 0–6)
GLUCOSE SERPL-MCNC: 99 MG/DL (ref 74–106)
HCT VFR BLD AUTO: 40 % (ref 39–51)
HGB BLD-MCNC: 13.2 G/DL (ref 13.5–17.5)
LYMPHOCYTES NFR BLD AUTO: 1.8 /CMM (ref 0.8–4.8)
LYMPHOCYTES NFR BLD AUTO: 14.3 % (ref 20–44)
MCHC RBC AUTO-ENTMCNC: 33 G/DL (ref 31–36)
MCV RBC AUTO: 101 FL (ref 80–96)
MONOCYTES NFR BLD AUTO: 1.6 /CMM (ref 0.1–1.3)
MONOCYTES NFR BLD AUTO: 13.1 % (ref 2–12)
NEUTROPHILS # BLD AUTO: 8.9 /CMM (ref 1.8–8.9)
NEUTROPHILS NFR BLD AUTO: 71.4 % (ref 43–81)
PLATELET # BLD AUTO: 271 /CMM (ref 150–450)
POTASSIUM SERPL-SCNC: 4.2 MMOL/L (ref 3.5–5.1)
PROT SERPL-MCNC: 7.4 G/DL (ref 6.4–8.2)
RBC # BLD AUTO: 3.97 MIL/UL (ref 4.5–6)
SODIUM SERPL-SCNC: 139 MMOL/L (ref 136–145)
WBC NRBC COR # BLD AUTO: 12.4 K/UL (ref 4.3–11)

## 2021-04-11 RX ADMIN — APIXABAN SCH MG: 5 TABLET, FILM COATED ORAL at 16:23

## 2021-04-11 RX ADMIN — THERA TABS SCH UDTAB: TAB at 08:25

## 2021-04-11 RX ADMIN — APIXABAN SCH MG: 5 TABLET, FILM COATED ORAL at 08:27

## 2021-04-11 RX ADMIN — Medication SCH ML: at 17:42

## 2021-04-11 RX ADMIN — NICOTINE SCH MG: 14 PATCH TRANSDERMAL at 08:25

## 2021-04-11 RX ADMIN — BENZTROPINE MESYLATE SCH MG: 1 TABLET ORAL at 08:25

## 2021-04-11 RX ADMIN — Medication SCH ML: at 08:00

## 2021-04-11 RX ADMIN — DIVALPROEX SODIUM SCH MG: 125 CAPSULE ORAL at 21:32

## 2021-04-11 RX ADMIN — LORAZEPAM PRN MG: 1 TABLET ORAL at 14:39

## 2021-04-11 RX ADMIN — BUDESONIDE SCH MG: 0.5 SUSPENSION RESPIRATORY (INHALATION) at 16:24

## 2021-04-11 RX ADMIN — Medication SCH MG: at 08:25

## 2021-04-11 RX ADMIN — BENZTROPINE MESYLATE SCH MG: 1 TABLET ORAL at 16:22

## 2021-04-11 RX ADMIN — TEMAZEPAM PRN MG: 7.5 CAPSULE ORAL at 21:32

## 2021-04-11 RX ADMIN — Medication SCH TAB: at 08:25

## 2021-04-11 RX ADMIN — HALOPERIDOL SCH MG: 5 TABLET ORAL at 08:25

## 2021-04-11 RX ADMIN — DIVALPROEX SODIUM SCH MG: 250 TABLET, DELAYED RELEASE ORAL at 08:25

## 2021-04-11 RX ADMIN — BUDESONIDE SCH MG: 0.5 SUSPENSION RESPIRATORY (INHALATION) at 09:00

## 2021-04-11 RX ADMIN — HALOPERIDOL SCH MG: 5 TABLET ORAL at 16:22

## 2021-04-11 NOTE — NUR
GPS RN CLOSING NOTES:

PATIENT IS CURRENTLY LAYING ON BED AWAKE, A/O X1. PATIENT SLEPT 7HR THIS SHIFT. PATIENT WAS 
MED COMPLIANT THIS SHIFT. NO C/O PAIN AND NO BEHAVIORAL ISSUES THIS SHIFT. NO S/S OF 
DISTRESS. RESPIRATION EVEN AND UNLABORED WITH EQUAL RISE AND FALL OF THE CHEST ON ROOM AIR. 
ALL PATIENT CARE NEEDS HAVE BEEN MET AS ANTICIPATED. BED IN LOWEST POSITION AND LOCKED WITH 
SIDE RAILS UP X2. WILL CONTINUE TO MONITOR FOR SAFETY, MOOD AND BEHAVIOR AND ENDORSE TO AM 
SHIFT.

## 2021-04-12 VITALS — SYSTOLIC BLOOD PRESSURE: 127 MMHG | DIASTOLIC BLOOD PRESSURE: 65 MMHG

## 2021-04-12 VITALS — SYSTOLIC BLOOD PRESSURE: 134 MMHG | DIASTOLIC BLOOD PRESSURE: 70 MMHG

## 2021-04-12 VITALS — DIASTOLIC BLOOD PRESSURE: 63 MMHG | SYSTOLIC BLOOD PRESSURE: 114 MMHG

## 2021-04-12 RX ADMIN — Medication SCH TAB: at 08:44

## 2021-04-12 RX ADMIN — BENZTROPINE MESYLATE SCH MG: 1 TABLET ORAL at 16:34

## 2021-04-12 RX ADMIN — DIVALPROEX SODIUM SCH MG: 125 CAPSULE ORAL at 08:44

## 2021-04-12 RX ADMIN — NICOTINE SCH MG: 14 PATCH TRANSDERMAL at 08:44

## 2021-04-12 RX ADMIN — HALOPERIDOL SCH MG: 5 TABLET ORAL at 16:33

## 2021-04-12 RX ADMIN — APIXABAN SCH MG: 5 TABLET, FILM COATED ORAL at 16:38

## 2021-04-12 RX ADMIN — APIXABAN SCH MG: 5 TABLET, FILM COATED ORAL at 08:45

## 2021-04-12 RX ADMIN — BUDESONIDE SCH MG: 0.5 SUSPENSION RESPIRATORY (INHALATION) at 08:11

## 2021-04-12 RX ADMIN — TEMAZEPAM PRN MG: 7.5 CAPSULE ORAL at 22:07

## 2021-04-12 RX ADMIN — Medication SCH MG: at 08:44

## 2021-04-12 RX ADMIN — Medication SCH ML: at 16:07

## 2021-04-12 RX ADMIN — HALOPERIDOL SCH MG: 5 TABLET ORAL at 08:44

## 2021-04-12 RX ADMIN — DIVALPROEX SODIUM SCH MG: 125 CAPSULE ORAL at 20:28

## 2021-04-12 RX ADMIN — THERA TABS SCH UDTAB: TAB at 08:44

## 2021-04-12 RX ADMIN — BUDESONIDE SCH MG: 0.5 SUSPENSION RESPIRATORY (INHALATION) at 16:00

## 2021-04-12 RX ADMIN — Medication SCH ML: at 07:26

## 2021-04-12 RX ADMIN — BENZTROPINE MESYLATE SCH MG: 1 TABLET ORAL at 08:44

## 2021-04-12 NOTE — NUR
[facility] APS: Omid DE JESUS ([phone]), APS SW, called the SW and stated that she 
is recommending that the pt does not return to the home as he gets aggressive with the pts 
wife.

## 2021-04-12 NOTE — NUR
RT



Pt is on room air with adequate SpO2. No COVID testing has been done with this pt, HHN tx 
not given due to risk of aerosolization. EMIGDIO Wheatley notified and aware. No SOB or respiratory 
distress noted at this time. Start time: 1331
Cecum: 1333
TI intubation: no 
End time:1349

[place] Bowel Prep Score :  7
Right colon:                       2
transverse colon:               3
left colon:                         2

## 2021-04-13 VITALS — DIASTOLIC BLOOD PRESSURE: 53 MMHG | SYSTOLIC BLOOD PRESSURE: 119 MMHG

## 2021-04-13 VITALS — SYSTOLIC BLOOD PRESSURE: 112 MMHG | DIASTOLIC BLOOD PRESSURE: 60 MMHG

## 2021-04-13 VITALS — DIASTOLIC BLOOD PRESSURE: 58 MMHG | SYSTOLIC BLOOD PRESSURE: 111 MMHG

## 2021-04-13 LAB
BASOPHILS # BLD AUTO: 0 /CMM (ref 0–0.2)
BASOPHILS NFR BLD AUTO: 0.3 % (ref 0–2)
BILIRUB UR QL STRIP: NEGATIVE
BUN SERPL-MCNC: 19 MG/DL (ref 7–18)
CALCIUM SERPL-MCNC: 9.4 MG/DL (ref 8.5–10.1)
CHLORIDE SERPL-SCNC: 98 MMOL/L (ref 98–107)
CO2 SERPL-SCNC: 29 MMOL/L (ref 21–32)
COLOR UR: YELLOW
CREAT SERPL-MCNC: 0.7 MG/DL (ref 0.6–1.3)
EOSINOPHIL NFR BLD AUTO: 2.8 % (ref 0–6)
GLUCOSE SERPL-MCNC: 98 MG/DL (ref 74–106)
GLUCOSE UR STRIP-MCNC: NEGATIVE MG/DL
HCT VFR BLD AUTO: 39 % (ref 39–51)
HGB BLD-MCNC: 12.7 G/DL (ref 13.5–17.5)
LEUKOCYTE ESTERASE UR QL STRIP: NEGATIVE
LYMPHOCYTES NFR BLD AUTO: 19.7 % (ref 20–44)
LYMPHOCYTES NFR BLD AUTO: 2.3 /CMM (ref 0.8–4.8)
MAGNESIUM SERPL-MCNC: 2 MG/DL (ref 1.8–2.4)
MCHC RBC AUTO-ENTMCNC: 33 G/DL (ref 31–36)
MCV RBC AUTO: 101 FL (ref 80–96)
MONOCYTES NFR BLD AUTO: 1.4 /CMM (ref 0.1–1.3)
MONOCYTES NFR BLD AUTO: 12.2 % (ref 2–12)
NEUTROPHILS # BLD AUTO: 7.7 /CMM (ref 1.8–8.9)
NEUTROPHILS NFR BLD AUTO: 65 % (ref 43–81)
NITRITE UR QL STRIP: NEGATIVE
PH UR STRIP: 5.5 [PH] (ref 5–8)
PHOSPHATE SERPL-MCNC: 3.8 MG/DL (ref 2.5–4.9)
PLATELET # BLD AUTO: 274 /CMM (ref 150–450)
POTASSIUM SERPL-SCNC: 3.9 MMOL/L (ref 3.5–5.1)
PROT UR QL STRIP: NEGATIVE MG/DL
RBC # BLD AUTO: 3.84 MIL/UL (ref 4.5–6)
SODIUM SERPL-SCNC: 136 MMOL/L (ref 136–145)
UROBILINOGEN UR STRIP-MCNC: 0.2 EU/DL
WBC NRBC COR # BLD AUTO: 11.8 K/UL (ref 4.3–11)

## 2021-04-13 RX ADMIN — APIXABAN SCH MG: 5 TABLET, FILM COATED ORAL at 09:46

## 2021-04-13 RX ADMIN — BUDESONIDE SCH MG: 0.5 SUSPENSION RESPIRATORY (INHALATION) at 17:00

## 2021-04-13 RX ADMIN — Medication SCH MG: at 09:43

## 2021-04-13 RX ADMIN — DIVALPROEX SODIUM SCH MG: 125 CAPSULE ORAL at 09:44

## 2021-04-13 RX ADMIN — APIXABAN SCH MG: 5 TABLET, FILM COATED ORAL at 18:22

## 2021-04-13 RX ADMIN — LORAZEPAM PRN MG: 1 TABLET ORAL at 21:40

## 2021-04-13 RX ADMIN — BENZTROPINE MESYLATE SCH MG: 1 TABLET ORAL at 18:23

## 2021-04-13 RX ADMIN — Medication SCH TAB: at 09:44

## 2021-04-13 RX ADMIN — LORAZEPAM PRN MG: 1 TABLET ORAL at 01:06

## 2021-04-13 RX ADMIN — THERA TABS SCH UDTAB: TAB at 09:44

## 2021-04-13 RX ADMIN — BUDESONIDE SCH MG: 0.5 SUSPENSION RESPIRATORY (INHALATION) at 09:00

## 2021-04-13 RX ADMIN — BENZTROPINE MESYLATE SCH MG: 1 TABLET ORAL at 09:44

## 2021-04-13 RX ADMIN — NICOTINE SCH MG: 14 PATCH TRANSDERMAL at 09:43

## 2021-04-13 RX ADMIN — DIVALPROEX SODIUM SCH MG: 125 CAPSULE ORAL at 21:40

## 2021-04-13 RX ADMIN — HALOPERIDOL SCH MG: 5 TABLET ORAL at 09:43

## 2021-04-13 RX ADMIN — HALOPERIDOL SCH MG: 5 TABLET ORAL at 18:22

## 2021-04-13 RX ADMIN — Medication SCH ML: at 17:00

## 2021-04-13 RX ADMIN — Medication SCH ML: at 08:00

## 2021-04-13 NOTE — NUR
RN NOTES,



PATIENT IN DINNING ROOM AT THIS TIME, RESTORIL GIVEN LAST NIGHT, AFTER PATIENT NOTED TRYING 
TO GETTING UP FROM BED AND ANXIOUS ATIVAN GIVEN, WITH LACK OF HOURS OF  SLEEP,  NO 
DISRUPTIVE BEHAVIOR NOTED THROUGHOUT THE SHIFT,  HAD SNACK LAST NIGHT, NO DISTRESS NOTED 
DURING THE NIGHT, WILL ENDORSE CONTINUITY OF CARE TO ONCOMING NURSE

## 2021-04-13 NOTE — NUR
Family Contact: SW called the pts wife, Mendy ([phone]), and discussed the pts 
discharge plan. SW explained SNF discharge and discussed the pts current behaviors and 
diagnosis with the pts wife. Pts wife stated that she cannot have the pt return home at this 
time and that she would like him to be placed. SW stated that she will work on that.

## 2021-04-13 NOTE — NUR
GPS RN OPENING NOTE



REPORT RECIEVED FROM ALLA KIRKPATRICK RN. PATIENT IN DINNING ROOM/[facility] IN [facility]. AT THIS TIME, 
HE IS NOT PARTICIPARING OR TALKATIVE, WITH OTHERS. RESPONDS INAPPRPRIATELY WHEN SPOKEN TOO. 
FRGAMENTED SENTENCING. BREATHING EVEN AND UNLABORED. IN NO APPARENT DISTRESS OR PAIN. AT 
THIS TIME HE IS COOPERATIVE WITH CARE, NO APPARENT SI/HI AT THIS TIME. WILL CONT TO MONITOR 
PER GPS PROTOCOL AND BEHAVIOR.

## 2021-04-14 VITALS — SYSTOLIC BLOOD PRESSURE: 150 MMHG | DIASTOLIC BLOOD PRESSURE: 77 MMHG

## 2021-04-14 VITALS — DIASTOLIC BLOOD PRESSURE: 61 MMHG | SYSTOLIC BLOOD PRESSURE: 126 MMHG

## 2021-04-14 VITALS — DIASTOLIC BLOOD PRESSURE: 77 MMHG | SYSTOLIC BLOOD PRESSURE: 150 MMHG

## 2021-04-14 RX ADMIN — NICOTINE SCH MG: 14 PATCH TRANSDERMAL at 10:46

## 2021-04-14 RX ADMIN — HALOPERIDOL SCH MG: 5 TABLET ORAL at 10:46

## 2021-04-14 RX ADMIN — THERA TABS SCH UDTAB: TAB at 10:45

## 2021-04-14 RX ADMIN — APIXABAN SCH MG: 5 TABLET, FILM COATED ORAL at 18:05

## 2021-04-14 RX ADMIN — DIVALPROEX SODIUM SCH MG: 125 CAPSULE ORAL at 10:45

## 2021-04-14 RX ADMIN — LORAZEPAM PRN MG: 1 TABLET ORAL at 20:00

## 2021-04-14 RX ADMIN — BENZTROPINE MESYLATE SCH MG: 1 TABLET ORAL at 10:46

## 2021-04-14 RX ADMIN — BUDESONIDE SCH MG: 0.5 SUSPENSION RESPIRATORY (INHALATION) at 16:13

## 2021-04-14 RX ADMIN — BUDESONIDE SCH MG: 0.5 SUSPENSION RESPIRATORY (INHALATION) at 08:00

## 2021-04-14 RX ADMIN — Medication SCH ML: at 17:50

## 2021-04-14 RX ADMIN — Medication SCH ML: at 08:00

## 2021-04-14 RX ADMIN — HALOPERIDOL SCH MG: 5 TABLET ORAL at 17:50

## 2021-04-14 RX ADMIN — APIXABAN SCH MG: 5 TABLET, FILM COATED ORAL at 10:51

## 2021-04-14 RX ADMIN — Medication SCH MG: at 10:46

## 2021-04-14 RX ADMIN — BENZTROPINE MESYLATE SCH MG: 1 TABLET ORAL at 17:50

## 2021-04-14 RX ADMIN — TEMAZEPAM PRN MG: 7.5 CAPSULE ORAL at 21:12

## 2021-04-14 RX ADMIN — Medication SCH TAB: at 10:46

## 2021-04-14 NOTE — NUR
Family Contact: Pts wife, Mendy ([phone]), called the SW and stated that she wanted 
to know how she can manage the pts finances. SW stated that she can apply to become the pts 
DPOA once the pt is released from the hospital as he cannot sign any paper work once he is 
on a hold. OLGA then informed her that the pt is going to be discharged on Friday.

## 2021-04-14 NOTE — NUR
radha sent.

-------------------------------------------------------------------------------

Addendum: 04/14/21 at 1906 by ALLA BOWEN RN

-------------------------------------------------------------------------------

above note incorrect.

## 2021-04-14 NOTE — NUR
SNF Referral: OLGA faxed a referral to [facility] with attn to Mary to the fax 
number: [phone].

## 2021-04-15 VITALS — DIASTOLIC BLOOD PRESSURE: 68 MMHG | SYSTOLIC BLOOD PRESSURE: 145 MMHG

## 2021-04-15 VITALS — DIASTOLIC BLOOD PRESSURE: 56 MMHG | SYSTOLIC BLOOD PRESSURE: 98 MMHG

## 2021-04-15 VITALS — SYSTOLIC BLOOD PRESSURE: 119 MMHG | DIASTOLIC BLOOD PRESSURE: 59 MMHG

## 2021-04-15 RX ADMIN — DIVALPROEX SODIUM SCH MG: 250 TABLET, DELAYED RELEASE ORAL at 09:34

## 2021-04-15 RX ADMIN — BUDESONIDE SCH MG: 0.5 SUSPENSION RESPIRATORY (INHALATION) at 08:01

## 2021-04-15 RX ADMIN — NICOTINE SCH MG: 14 PATCH TRANSDERMAL at 08:35

## 2021-04-15 RX ADMIN — BENZTROPINE MESYLATE SCH MG: 1 TABLET ORAL at 08:33

## 2021-04-15 RX ADMIN — Medication SCH ML: at 08:40

## 2021-04-15 RX ADMIN — APIXABAN SCH MG: 5 TABLET, FILM COATED ORAL at 17:17

## 2021-04-15 RX ADMIN — HALOPERIDOL SCH MG: 5 TABLET ORAL at 17:17

## 2021-04-15 RX ADMIN — BENZTROPINE MESYLATE SCH MG: 1 TABLET ORAL at 17:17

## 2021-04-15 RX ADMIN — Medication SCH MG: at 08:33

## 2021-04-15 RX ADMIN — APIXABAN SCH MG: 5 TABLET, FILM COATED ORAL at 08:35

## 2021-04-15 RX ADMIN — Medication SCH TAB: at 08:35

## 2021-04-15 RX ADMIN — DIVALPROEX SODIUM SCH MG: 250 TABLET, DELAYED RELEASE ORAL at 17:17

## 2021-04-15 RX ADMIN — HALOPERIDOL SCH MG: 5 TABLET ORAL at 08:33

## 2021-04-15 RX ADMIN — LORAZEPAM PRN MG: 1 TABLET ORAL at 21:47

## 2021-04-15 RX ADMIN — DIVALPROEX SODIUM SCH MG: 250 TABLET, DELAYED RELEASE ORAL at 12:03

## 2021-04-15 RX ADMIN — BUDESONIDE SCH MG: 0.5 SUSPENSION RESPIRATORY (INHALATION) at 16:17

## 2021-04-15 RX ADMIN — Medication SCH ML: at 17:49

## 2021-04-15 RX ADMIN — THERA TABS SCH UDTAB: TAB at 08:35

## 2021-04-15 NOTE — NUR
RN NOTES: ANXIETY 

PT. C/O FEELING ANXIOUS ,RESTLESS, 1 MG PO PRN GIVEN PER PT. REQUEST ,WILL CONTINUE TO 
MONITOR.

## 2021-04-15 NOTE — NUR
SNF Contact: Karl ([phone]) from [facility] contacted the SW and stated that the pt 
was accepted to their facility.

## 2021-04-16 VITALS — DIASTOLIC BLOOD PRESSURE: 59 MMHG | SYSTOLIC BLOOD PRESSURE: 131 MMHG

## 2021-04-16 RX ADMIN — DIVALPROEX SODIUM SCH MG: 250 TABLET, DELAYED RELEASE ORAL at 12:58

## 2021-04-16 RX ADMIN — HALOPERIDOL SCH MG: 5 TABLET ORAL at 09:29

## 2021-04-16 RX ADMIN — BENZTROPINE MESYLATE SCH MG: 1 TABLET ORAL at 09:29

## 2021-04-16 RX ADMIN — Medication SCH TAB: at 09:31

## 2021-04-16 RX ADMIN — Medication SCH ML: at 08:05

## 2021-04-16 RX ADMIN — APIXABAN SCH MG: 5 TABLET, FILM COATED ORAL at 09:30

## 2021-04-16 RX ADMIN — DIVALPROEX SODIUM SCH MG: 250 TABLET, DELAYED RELEASE ORAL at 09:29

## 2021-04-16 RX ADMIN — Medication SCH MG: at 09:29

## 2021-04-16 RX ADMIN — NICOTINE SCH MG: 14 PATCH TRANSDERMAL at 09:00

## 2021-04-16 RX ADMIN — BUDESONIDE SCH MG: 0.5 SUSPENSION RESPIRATORY (INHALATION) at 09:00

## 2021-04-16 RX ADMIN — THERA TABS SCH UDTAB: TAB at 09:29

## 2021-04-16 NOTE — NUR
Discharge Note: Pt will be discharged to [facility] located at 
[address] [place] [address]; [phone]. Pts wife, Mendy 
([phone]), was informed. Pt will be transported via [facility] at 1PM. Upon discharge, 
the pt appears to be alert and oriented x3 (time, place, and self). Pt appears to be in a 
depressed mood and presents with a calm mood. Pt denies both suicidal and homicidal ideation 
as well as auditory and visual hallucinations. Pt appears to be ambulatory with an unsteady 
gait. Pt appears to be well groomed and appropriately dressed. Pt will continue to be under 
the care of her psychiatrist, Dr. Aguilera, located at [address] [place] [address]; 
[phone] and internist, Dr. Cabrera, located at [address] [facility] [address], [place] 
[address]; [phone]. The choice of vendor form and multidisciplinary exit care form were 
done, printed, signed, and given to the patient.

-------------------------------------------------------------------------------

Addendum: 04/16/21 at 1300 by OLGA SON

-------------------------------------------------------------------------------

OLGA provided the pt with substance abuse referrals as well as smoking cessation referrals. OLGA 
provided the pt with a copy and placed a copy in the pts chart. Pt will continue to work 
with his treatment team.

## 2021-04-16 NOTE — NUR
Dr. Aguilera gave an order to D/C hold and D/C to [facility], [facility] [place]. To 
continue same meds including prn and to follow up with the psych and medical doctors.

## 2021-04-16 NOTE — NUR
RN NOTE-PT ALERT ORIENTED TO PERSON ONLY, CONFUSED, INTERACTIVE PT MED COMPLIANT AND GOOD PO 
INTAKE

## 2021-04-16 NOTE — NUR
RN NOTE- PT DC AT THIS TIME TO [facility]., PT IS ALERT 
ORIENTED TO PERSON ONLY. CONFUSED AND CALM. PT DIRECTABLE AND MILDLY INTERACTIVE. MED 
COMPLIANT, PO INTAKE GOOD. ID WRISTBAND REMOVED, VALUABLES RETURNED. REPORT GIVEN TO 
FACILITY. AFTERCARE REVIEWED W AMBULANCE STAFF. PT UNABLE TO RECEIVE VACCS DUE TO CONFUSION 
AS TO HIS VACC HX. ESCORTED OFF UNIT BY STAFF.

## 2021-10-25 ENCOUNTER — APPOINTMENT (OUTPATIENT)
Dept: CT IMAGING | Age: 71
DRG: 813 | End: 2021-10-25
Payer: MEDICARE

## 2021-10-25 ENCOUNTER — APPOINTMENT (OUTPATIENT)
Dept: GENERAL RADIOLOGY | Age: 71
DRG: 813 | End: 2021-10-25
Payer: MEDICARE

## 2021-10-25 ENCOUNTER — HOSPITAL ENCOUNTER (INPATIENT)
Age: 71
LOS: 8 days | Discharge: HOME | DRG: 813 | End: 2021-11-03
Attending: EMERGENCY MEDICINE | Admitting: INTERNAL MEDICINE
Payer: MEDICARE

## 2021-10-25 DIAGNOSIS — D64.9 ANEMIA REQUIRING TRANSFUSIONS: Primary | ICD-10-CM

## 2021-10-25 DIAGNOSIS — R41.82 ALTERED MENTAL STATUS, UNSPECIFIED ALTERED MENTAL STATUS TYPE: ICD-10-CM

## 2021-10-25 LAB
ABO/RH: NORMAL
ALBUMIN SERPL-MCNC: 2.8 G/DL (ref 3.5–5.2)
ALP BLD-CCNC: 76 U/L (ref 40–129)
ALT SERPL-CCNC: 11 U/L (ref 0–40)
ANION GAP SERPL CALCULATED.3IONS-SCNC: 11 MMOL/L (ref 7–16)
ANTIBODY SCREEN: NORMAL
AST SERPL-CCNC: 42 U/L (ref 0–39)
BASOPHILS ABSOLUTE: 0.01 E9/L (ref 0–0.2)
BASOPHILS RELATIVE PERCENT: 0.1 % (ref 0–2)
BILIRUB SERPL-MCNC: 0.5 MG/DL (ref 0–1.2)
BUN BLDV-MCNC: 58 MG/DL (ref 6–23)
CALCIUM SERPL-MCNC: 8.4 MG/DL (ref 8.6–10.2)
CHLORIDE BLD-SCNC: 101 MMOL/L (ref 98–107)
CO2: 19 MMOL/L (ref 22–29)
CREAT SERPL-MCNC: 2 MG/DL (ref 0.7–1.2)
EOSINOPHILS ABSOLUTE: 0 E9/L (ref 0.05–0.5)
EOSINOPHILS RELATIVE PERCENT: 0 % (ref 0–6)
GFR AFRICAN AMERICAN: 40
GFR NON-AFRICAN AMERICAN: 33 ML/MIN/1.73
GLUCOSE BLD-MCNC: 83 MG/DL (ref 74–99)
HCT VFR BLD CALC: 18.7 % (ref 37–54)
HEMOGLOBIN: 6.1 G/DL (ref 12.5–16.5)
IMMATURE GRANULOCYTES #: 0.29 E9/L
IMMATURE GRANULOCYTES %: 2.9 % (ref 0–5)
LACTIC ACID, SEPSIS: 1.2 MMOL/L (ref 0.5–1.9)
LYMPHOCYTES ABSOLUTE: 1.25 E9/L (ref 1.5–4)
LYMPHOCYTES RELATIVE PERCENT: 12.5 % (ref 20–42)
MCH RBC QN AUTO: 31.6 PG (ref 26–35)
MCHC RBC AUTO-ENTMCNC: 32.6 % (ref 32–34.5)
MCV RBC AUTO: 96.9 FL (ref 80–99.9)
MONOCYTES ABSOLUTE: 0.74 E9/L (ref 0.1–0.95)
MONOCYTES RELATIVE PERCENT: 7.4 % (ref 2–12)
NEUTROPHILS ABSOLUTE: 7.7 E9/L (ref 1.8–7.3)
NEUTROPHILS RELATIVE PERCENT: 77.1 % (ref 43–80)
PDW BLD-RTO: 13.4 FL (ref 11.5–15)
PLATELET # BLD: 220 E9/L (ref 130–450)
PMV BLD AUTO: 10.6 FL (ref 7–12)
POTASSIUM REFLEX MAGNESIUM: 4.8 MMOL/L (ref 3.5–5)
PRO-BNP: 755 PG/ML (ref 0–125)
RBC # BLD: 1.93 E12/L (ref 3.8–5.8)
SODIUM BLD-SCNC: 131 MMOL/L (ref 132–146)
TOTAL PROTEIN: 6.1 G/DL (ref 6.4–8.3)
TROPONIN, HIGH SENSITIVITY: 63 NG/L (ref 0–11)
WBC # BLD: 10 E9/L (ref 4.5–11.5)

## 2021-10-25 PROCEDURE — 80053 COMPREHEN METABOLIC PANEL: CPT

## 2021-10-25 PROCEDURE — 84484 ASSAY OF TROPONIN QUANT: CPT

## 2021-10-25 PROCEDURE — 74176 CT ABD & PELVIS W/O CONTRAST: CPT

## 2021-10-25 PROCEDURE — 83880 ASSAY OF NATRIURETIC PEPTIDE: CPT

## 2021-10-25 PROCEDURE — 6360000002 HC RX W HCPCS

## 2021-10-25 PROCEDURE — 86901 BLOOD TYPING SEROLOGIC RH(D): CPT

## 2021-10-25 PROCEDURE — 86900 BLOOD TYPING SEROLOGIC ABO: CPT

## 2021-10-25 PROCEDURE — 36415 COLL VENOUS BLD VENIPUNCTURE: CPT

## 2021-10-25 PROCEDURE — P9016 RBC LEUKOCYTES REDUCED: HCPCS

## 2021-10-25 PROCEDURE — 96374 THER/PROPH/DIAG INJ IV PUSH: CPT

## 2021-10-25 PROCEDURE — 83605 ASSAY OF LACTIC ACID: CPT

## 2021-10-25 PROCEDURE — 86923 COMPATIBILITY TEST ELECTRIC: CPT

## 2021-10-25 PROCEDURE — 93005 ELECTROCARDIOGRAM TRACING: CPT

## 2021-10-25 PROCEDURE — 71045 X-RAY EXAM CHEST 1 VIEW: CPT

## 2021-10-25 PROCEDURE — 85025 COMPLETE CBC W/AUTO DIFF WBC: CPT

## 2021-10-25 PROCEDURE — 99285 EMERGENCY DEPT VISIT HI MDM: CPT

## 2021-10-25 PROCEDURE — 86850 RBC ANTIBODY SCREEN: CPT

## 2021-10-25 PROCEDURE — 87040 BLOOD CULTURE FOR BACTERIA: CPT

## 2021-10-25 PROCEDURE — 70450 CT HEAD/BRAIN W/O DYE: CPT

## 2021-10-25 PROCEDURE — 2580000003 HC RX 258

## 2021-10-25 RX ORDER — SODIUM CHLORIDE 9 MG/ML
INJECTION, SOLUTION INTRAVENOUS PRN
Status: DISCONTINUED | OUTPATIENT
Start: 2021-10-25 | End: 2021-11-01 | Stop reason: SDUPTHER

## 2021-10-25 RX ORDER — PANTOPRAZOLE SODIUM 40 MG/10ML
40 INJECTION, POWDER, LYOPHILIZED, FOR SOLUTION INTRAVENOUS ONCE
Status: COMPLETED | OUTPATIENT
Start: 2021-10-25 | End: 2021-10-26

## 2021-10-25 RX ORDER — 0.9 % SODIUM CHLORIDE 0.9 %
1000 INTRAVENOUS SOLUTION INTRAVENOUS ONCE
Status: COMPLETED | OUTPATIENT
Start: 2021-10-25 | End: 2021-10-26

## 2021-10-25 RX ORDER — KETOROLAC TROMETHAMINE 30 MG/ML
15 INJECTION, SOLUTION INTRAMUSCULAR; INTRAVENOUS ONCE
Status: COMPLETED | OUTPATIENT
Start: 2021-10-25 | End: 2021-10-25

## 2021-10-25 RX ADMIN — SODIUM CHLORIDE 1000 ML: 9 INJECTION, SOLUTION INTRAVENOUS at 23:09

## 2021-10-25 RX ADMIN — KETOROLAC TROMETHAMINE 15 MG: 30 INJECTION, SOLUTION INTRAMUSCULAR at 23:09

## 2021-10-25 ASSESSMENT — PAIN SCALES - GENERAL: PAINLEVEL_OUTOF10: 5

## 2021-10-25 NOTE — LETTER
[place] Department Medicaid  CERTIFICATION OF NECESSITY  FOR NON-EMERGENCY TRANSPORTATION   BY GROUND AMBULANCE      Individual Information   1. Name: Jacqueline Linares 2. [place] Medicaid Billing Number:    3. Address: [address] [place] [address] [address] [address]      Transportation Provider Information   4. Provider Name: MARITA   5. [place] Medicaid Provider Number:  National Provider Identifier (NPI):      Certification  7. Criteria:  During transport, this individual requires:  [] Medical treatment or continuous     supervision by an EMT. [x] The administration or regulation of oxygen by another person. [] Supervised protective restraint. 8. Period Beginning Date: 10/     /21   9. Length  [x] Not more than 1 day(s)  [] One Year     Additional Information Relevant to Certification   10. Comments or Explanations, If Necessary or Appropriate   Significantly elevated PTT and bleeding in setting of history of Lyme disease. I am concerned about acquired hemophilia. Needs further work up at tertiary center not available at present location. Left pleural effusion vs scarring. Certifying Practitioner Information   11. Name of Practitioner: Dr Seth HESS   12. [place] Medicaid Provider Number, If Applicable:  [address] Provider Identifier (NPI):      Signature Information   14. Date of Signature: 10/29/21 15. Name of Person Signing: Jose Luis HENDERSON   16. Signature and Professional Designation:  Cait HENDERSON discharge planner     [facility] [address]  Rev. 7/2015      [address] Encounter Date/Time: 10/25/2021 2059    Hospital Account: [de-identified]    MRN: [id number]    Patient: Jacqueline Linares    Contact Serial #: [id number]      ENCOUNTER          Patient Class: I Private Enc?   No Unit RM BD: [facility] Service: Med/Surg   Encounter DX: Anemia requiring transfu*   ADM Provider: Alyssia Meng DO   Procedure:     ATT Provider: Dhara Santos MD   REF Provider:        Admission DX: Anemia requiring transfusions, Altered mental status, unspecified altered mental status type and DX codes: D64.9, R41.82      PATIENT                 Name: Dipesh Purcell : 1950 (71 yrs)   Address: [address] [place] [address] Sex: Male   [facility] [address]         Marital Status:    Employer: [employer]         Nondenominational: None   Primary Care Provider: Nimco Odell MD         Primary Phone: [phone]   EMERGENCY CONTACT   Contact Name Legal Guardian? Relationship to Patient Home Phone Work Phone   1. Jefry Summers  2. *No Contact Specified*      Spouse    [phone]                 GUARANTOR            Guarantor: Dipesh Purcell     : 1950   Address: [address] [place] [address] Sex: Male   [place] [address]     Relation to Patient: Self       Home Phone: [phone]   Guarantor ID: [id number]       Work Phone: [phone]   Guarantor Employer: UNKNOWN         Status: UNKNOWN      COVERAGE        PRIMARY INSURANCE   Payor: MEDICARE Plan: MEDICARE PART A AND B   Payor Address: [facility] [address],  [facility] [address], [place] [address]       Group Number:   Insurance Type: INDEMNITY   Subscriber Name: Rufino Castellanos : 1950   Subscriber ID: [id number] Pat. Rel. to Sub: Self   SECONDARY INSURANCE   Payor: MEDICAL MUTUAL Plan: MEDICAL MUTUAL TRADITION*   Payor Address:  [place], [place] [address]          Group Number: [id number] Insurance Type: [id number] Name: Rufino Castellanos : 1950   Subscriber ID: [id number] Pat.  Rel. to Sub: SELF           CSN: [id number]

## 2021-10-26 PROBLEM — D64.9 ANEMIA REQUIRING TRANSFUSIONS: Status: ACTIVE | Noted: 2021-10-26

## 2021-10-26 PROBLEM — M27.61: Status: ACTIVE | Noted: 2021-10-26

## 2021-10-26 PROBLEM — A69.20 LYME DISEASE: Status: RESOLVED | Noted: 2019-01-11 | Resolved: 2021-10-26

## 2021-10-26 PROBLEM — J69.0 ASPIRATION PNEUMONIA (HCC): Status: ACTIVE | Noted: 2021-10-26

## 2021-10-26 PROBLEM — N40.0 BPH (BENIGN PROSTATIC HYPERPLASIA): Status: ACTIVE | Noted: 2021-10-26

## 2021-10-26 LAB
BACTERIA: ABNORMAL /HPF
BILIRUBIN URINE: NEGATIVE
BLOOD BANK DISPENSE STATUS: NORMAL
BLOOD BANK DISPENSE STATUS: NORMAL
BLOOD BANK PRODUCT CODE: NORMAL
BLOOD BANK PRODUCT CODE: NORMAL
BLOOD, URINE: ABNORMAL
BPU ID: NORMAL
BPU ID: NORMAL
C-REACTIVE PROTEIN: 13.7 MG/DL (ref 0–0.4)
CLARITY: CLEAR
COLOR: YELLOW
CREATININE URINE: 91 MG/DL (ref 40–278)
DESCRIPTION BLOOD BANK: NORMAL
DESCRIPTION BLOOD BANK: NORMAL
GLUCOSE URINE: NEGATIVE MG/DL
HCT VFR BLD CALC: 23.6 % (ref 37–54)
HEMOGLOBIN: 8.1 G/DL (ref 12.5–16.5)
INR BLD: 1.1
KETONES, URINE: NEGATIVE MG/DL
LACTIC ACID, SEPSIS: 1.5 MMOL/L (ref 0.5–1.9)
LEUKOCYTE ESTERASE, URINE: ABNORMAL
NITRITE, URINE: NEGATIVE
PH UA: 6 (ref 5–9)
PROCALCITONIN: 0.67 NG/ML (ref 0–0.08)
PROTEIN PROTEIN: 21 MG/DL (ref 0–12)
PROTEIN UA: NEGATIVE MG/DL
PROTEIN/CREAT RATIO: 0.2
PROTEIN/CREAT RATIO: 0.2 (ref 0–0.2)
PROTHROMBIN TIME: 12 SEC (ref 9.3–12.4)
RBC UA: ABNORMAL /HPF (ref 0–2)
SODIUM URINE: 53 MMOL/L
SPECIFIC GRAVITY UA: 1.01 (ref 1–1.03)
TROPONIN, HIGH SENSITIVITY: 62 NG/L (ref 0–11)
UROBILINOGEN, URINE: 0.2 E.U./DL
WBC UA: ABNORMAL /HPF (ref 0–5)

## 2021-10-26 PROCEDURE — 85610 PROTHROMBIN TIME: CPT

## 2021-10-26 PROCEDURE — 82570 ASSAY OF URINE CREATININE: CPT

## 2021-10-26 PROCEDURE — 85018 HEMOGLOBIN: CPT

## 2021-10-26 PROCEDURE — 36430 TRANSFUSION BLD/BLD COMPNT: CPT

## 2021-10-26 PROCEDURE — 6360000002 HC RX W HCPCS

## 2021-10-26 PROCEDURE — 86140 C-REACTIVE PROTEIN: CPT

## 2021-10-26 PROCEDURE — 2580000003 HC RX 258: Performed by: INTERNAL MEDICINE

## 2021-10-26 PROCEDURE — 81001 URINALYSIS AUTO W/SCOPE: CPT

## 2021-10-26 PROCEDURE — 99223 1ST HOSP IP/OBS HIGH 75: CPT | Performed by: INTERNAL MEDICINE

## 2021-10-26 PROCEDURE — 36415 COLL VENOUS BLD VENIPUNCTURE: CPT

## 2021-10-26 PROCEDURE — 85014 HEMATOCRIT: CPT

## 2021-10-26 PROCEDURE — 84484 ASSAY OF TROPONIN QUANT: CPT

## 2021-10-26 PROCEDURE — 2060000000 HC ICU INTERMEDIATE R&B

## 2021-10-26 PROCEDURE — C9113 INJ PANTOPRAZOLE SODIUM, VIA: HCPCS

## 2021-10-26 PROCEDURE — 84156 ASSAY OF PROTEIN URINE: CPT

## 2021-10-26 PROCEDURE — 83605 ASSAY OF LACTIC ACID: CPT

## 2021-10-26 PROCEDURE — 6370000000 HC RX 637 (ALT 250 FOR IP): Performed by: INTERNAL MEDICINE

## 2021-10-26 PROCEDURE — 84300 ASSAY OF URINE SODIUM: CPT

## 2021-10-26 PROCEDURE — 84145 PROCALCITONIN (PCT): CPT

## 2021-10-26 PROCEDURE — 87088 URINE BACTERIA CULTURE: CPT

## 2021-10-26 RX ORDER — ONDANSETRON 4 MG/1
4 TABLET, ORALLY DISINTEGRATING ORAL EVERY 8 HOURS PRN
Status: DISCONTINUED | OUTPATIENT
Start: 2021-10-26 | End: 2021-11-03 | Stop reason: HOSPADM

## 2021-10-26 RX ORDER — HYDROCHLOROTHIAZIDE 12.5 MG/1
12.5 TABLET ORAL DAILY
Status: DISCONTINUED | OUTPATIENT
Start: 2021-10-26 | End: 2021-11-03 | Stop reason: HOSPADM

## 2021-10-26 RX ORDER — PREDNISONE 10 MG/1
10 TABLET ORAL EVERY MORNING
Status: ON HOLD | COMMUNITY
End: 2021-11-03 | Stop reason: HOSPADM

## 2021-10-26 RX ORDER — 0.9 % SODIUM CHLORIDE 0.9 %
1000 INTRAVENOUS SOLUTION INTRAVENOUS ONCE
Status: COMPLETED | OUTPATIENT
Start: 2021-10-26 | End: 2021-10-26

## 2021-10-26 RX ORDER — VALSARTAN AND HYDROCHLOROTHIAZIDE 320; 12.5 MG/1; MG/1
1 TABLET, FILM COATED ORAL DAILY
COMMUNITY

## 2021-10-26 RX ORDER — ONDANSETRON 2 MG/ML
4 INJECTION INTRAMUSCULAR; INTRAVENOUS EVERY 6 HOURS PRN
Status: DISCONTINUED | OUTPATIENT
Start: 2021-10-26 | End: 2021-11-03 | Stop reason: HOSPADM

## 2021-10-26 RX ORDER — SODIUM CHLORIDE 0.9 % (FLUSH) 0.9 %
5-40 SYRINGE (ML) INJECTION PRN
Status: DISCONTINUED | OUTPATIENT
Start: 2021-10-26 | End: 2021-11-03 | Stop reason: HOSPADM

## 2021-10-26 RX ORDER — HYDROCODONE BITARTRATE AND ACETAMINOPHEN 10; 325 MG/1; MG/1
1 TABLET ORAL EVERY 6 HOURS PRN
Status: DISCONTINUED | OUTPATIENT
Start: 2021-10-26 | End: 2021-11-03 | Stop reason: HOSPADM

## 2021-10-26 RX ORDER — AMOXICILLIN AND CLAVULANATE POTASSIUM 500; 125 MG/1; MG/1
1 TABLET, FILM COATED ORAL EVERY 8 HOURS SCHEDULED
Status: DISCONTINUED | OUTPATIENT
Start: 2021-10-26 | End: 2021-11-03

## 2021-10-26 RX ORDER — ACETAMINOPHEN 325 MG/1
650 TABLET ORAL EVERY 6 HOURS PRN
Status: DISCONTINUED | OUTPATIENT
Start: 2021-10-26 | End: 2021-11-03 | Stop reason: HOSPADM

## 2021-10-26 RX ORDER — LISINOPRIL AND HYDROCHLOROTHIAZIDE 20; 12.5 MG/1; MG/1
1 TABLET ORAL DAILY
Status: DISCONTINUED | OUTPATIENT
Start: 2021-10-26 | End: 2021-10-26 | Stop reason: CLARIF

## 2021-10-26 RX ORDER — LISINOPRIL 20 MG/1
20 TABLET ORAL DAILY
Status: DISCONTINUED | OUTPATIENT
Start: 2021-10-26 | End: 2021-11-03 | Stop reason: HOSPADM

## 2021-10-26 RX ORDER — SODIUM CHLORIDE 9 MG/ML
25 INJECTION, SOLUTION INTRAVENOUS PRN
Status: DISCONTINUED | OUTPATIENT
Start: 2021-10-26 | End: 2021-11-03 | Stop reason: HOSPADM

## 2021-10-26 RX ORDER — ONDANSETRON 4 MG/1
4 TABLET, ORALLY DISINTEGRATING ORAL EVERY 8 HOURS PRN
Status: DISCONTINUED | OUTPATIENT
Start: 2021-10-26 | End: 2021-10-26 | Stop reason: SDUPTHER

## 2021-10-26 RX ORDER — ACETAMINOPHEN 650 MG/1
650 SUPPOSITORY RECTAL EVERY 6 HOURS PRN
Status: DISCONTINUED | OUTPATIENT
Start: 2021-10-26 | End: 2021-11-03 | Stop reason: HOSPADM

## 2021-10-26 RX ORDER — SODIUM CHLORIDE 0.9 % (FLUSH) 0.9 %
5-40 SYRINGE (ML) INJECTION EVERY 12 HOURS SCHEDULED
Status: DISCONTINUED | OUTPATIENT
Start: 2021-10-26 | End: 2021-11-03 | Stop reason: HOSPADM

## 2021-10-26 RX ORDER — PANTOPRAZOLE SODIUM 40 MG/10ML
40 INJECTION, POWDER, LYOPHILIZED, FOR SOLUTION INTRAVENOUS DAILY
Status: DISCONTINUED | OUTPATIENT
Start: 2021-10-27 | End: 2021-11-03 | Stop reason: HOSPADM

## 2021-10-26 RX ORDER — IBUPROFEN 200 MG
200 TABLET ORAL EVERY 6 HOURS PRN
COMMUNITY
End: 2021-10-26 | Stop reason: ALTCHOICE

## 2021-10-26 RX ORDER — HYDROCODONE BITARTRATE AND ACETAMINOPHEN 10; 325 MG/1; MG/1
1 TABLET ORAL EVERY 8 HOURS PRN
COMMUNITY
End: 2021-10-26 | Stop reason: ALTCHOICE

## 2021-10-26 RX ORDER — PREDNISONE 1 MG/1
4 TABLET ORAL
Status: ON HOLD | COMMUNITY
End: 2021-11-03 | Stop reason: HOSPADM

## 2021-10-26 RX ADMIN — AMOXICILLIN AND CLAVULANATE POTASSIUM 1 TABLET: 500; 125 TABLET, FILM COATED ORAL at 14:29

## 2021-10-26 RX ADMIN — LISINOPRIL 20 MG: 20 TABLET ORAL at 14:31

## 2021-10-26 RX ADMIN — SODIUM CHLORIDE 1000 ML: 9 INJECTION, SOLUTION INTRAVENOUS at 04:54

## 2021-10-26 RX ADMIN — AMOXICILLIN AND CLAVULANATE POTASSIUM 1 TABLET: 500; 125 TABLET, FILM COATED ORAL at 22:35

## 2021-10-26 RX ADMIN — HYDROCHLOROTHIAZIDE 12.5 MG: 12.5 TABLET ORAL at 14:32

## 2021-10-26 RX ADMIN — ACETAMINOPHEN 650 MG: 325 TABLET ORAL at 22:35

## 2021-10-26 RX ADMIN — PANTOPRAZOLE SODIUM 40 MG: 40 INJECTION, POWDER, FOR SOLUTION INTRAVENOUS at 04:54

## 2021-10-26 RX ADMIN — Medication 10 ML: at 23:45

## 2021-10-26 ASSESSMENT — PAIN SCALES - GENERAL
PAINLEVEL_OUTOF10: 0
PAINLEVEL_OUTOF10: 0

## 2021-10-26 NOTE — ED NOTES
Patient repositioned for comfort. Blood continues to infuse without complication. Wife at bedside. Will monitor.      Shahnaz Rivero RN  10/26/21 1313

## 2021-10-26 NOTE — H&P
[facility] History and Physical        Chief Complaint:  Anemia   History of Present Illness   The patient is a 80 y.o. male    Known HTN, treated lyme disease 2 years ago - still on steroids, not on blood thinners  ONE week ago Sp dental implant surgery extensive spitting up blood and large ecchymosis extending around mouth and down neck. Over past few days , he continued to bleed profusely and ended up swallowing most of the blood. Including up to 2 days ago,. After that, he noticed some darker stools  very lethargic and fatigued for the past couple days  very tired not moving from the couch  +fevers, rule out pneumonia aspiration vs dental infection   Also hypoxic in ER 88% RA- placed on NCo2    Pre syncopal.   Very weak, but unable to stand to urinate, complaining of uinary retention now, \"can/t pee in hand held urinal\" - needs to stand- likley BPH    New hypoxia - ? L sided infiltrate- admits to swallowing blood gagging  In ER hemoccult +  Low BP< inc HR  hgb dropped to 6.1      - hx taken from the paitent  And wife  REVIEW OF SYSTEMS:  no fevers, chills, cp, sob, n/v, ha, vision/hearing changes, wt changes, hot/cold flashes, other open skin lesions, diarrhea, constipation, dysuria/hematuria unless noted in HPI. Complete ROS performed with the patient and is otherwise negative. Past Medical History:      Diagnosis Date    Hypertension     Lyme disease 2018       Past Surgical History:        Procedure Laterality Date    OTHER SURGICAL HISTORY      dental implant       Home Medications:  Prior to Admission medications    Medication Sig Start Date End Date Taking?  Authorizing Provider   ferrous sulfate 325 (65 Fe) MG EC tablet Take 325 mg by mouth daily (with breakfast)    Historical Provider, MD   lisinopril-hydrochlorothiazide (PRINZIDE;ZESTORETIC) 20-12.5 MG per tablet Take 1 tablet by mouth daily    Historical Provider, MD   ondansetron (ZOFRAN-ODT) 4 MG disintegrating tablet Take 4 mg by mouth every 8 hours as needed for Nausea or Vomiting    Historical Provider, MD       Allergies:  Doxycycline    Social History:   TOBACCO:   reports that he quit smoking about 6 years ago. His smoking use included cigarettes. He smoked 0.50 packs per day. He has never used smokeless tobacco.  ETOH:   reports current alcohol use. Family History:   History reviewed. No pertinent family history. Or deferred/otherwise considered non contributory to current admission  PHYSICAL EXAM:    VS: /69   Pulse 97   Temp 99.1 °F (37.3 °C) (Axillary)   Resp 21   Ht 5' 8\" (1.727 m)   Wt 151 lb (68.5 kg)   SpO2 97%   BMI 22.96 kg/m²     General Appearance:     + acute distress. Psych:  HEENT:    A.O. As per HPI details  NC/AT, PERRL, no pallor no icterus, lips/ext mucous membrane bruising, around lips, down neck, dental issues/bleed  o2  NC   Neck:   Supple, trachea midline, no obvious JVD +brusing   Resp:     Dec bS No wheezes,+bibasilar rhonchi   Chest wall:    No tenderness or deformity   Heart:    Tachy Regular rate and rhythm, S1 and S2 normal, no rub or gallop. Abdomen:     Soft, non-tender, bowel sounds active    no suspicious obvious masses/organomegaly   Genitalia & Rectal:    Deferred.    Extremities x4:   Extremities normal, atraumatic, no cyanosis, no clubbing   Musculoskeletal:      NO active synovitis or swollen b/l wrists, 2-5 MCPs examined   Skin:   Skin color,bruising down neck, into chest wall     Lymph nodes:   Cervical nodes grossly normal   Neurologic:  .Grossly symmetric  strength in UEs and LEs with symmetric grossly intact to light touch sensation     LABS:  CBC:   Lab Results   Component Value Date    WBC 10.0 10/25/2021    RBC 1.93 10/25/2021    HGB 6.1 10/25/2021    HCT 18.7 10/25/2021     10/25/2021    MCV 96.9 10/25/2021     BMP:    Lab Results   Component Value Date     10/25/2021    K 4.8 10/25/2021     10/25/2021    CO2 19 10/25/2021    BUN 58 10/25/2021    CREATININE 2.0 10/25/2021    GLUCOSE 83 10/25/2021    CALCIUM 8.4 10/25/2021     Hepatic Function Panel:    Lab Results   Component Value Date    ALKPHOS 76 10/25/2021    AST 42 10/25/2021    ALT 11 10/25/2021    PROT 6.1 10/25/2021    LABALBU 2.8 10/25/2021    BILITOT 0.5 10/25/2021     Magnesium:  No results found for: MG    PT/INR:  No results found for: PROTIME, INR  U/A:   Lab Results   Component Value Date    LEUKOCYTESUR TRACE 10/26/2021    PHUR 6.0 10/26/2021    WBCUA 2-5 10/26/2021    RBCUA 5-10 10/26/2021    BACTERIA RARE 10/26/2021    SPECGRAV 1.015 10/26/2021    BLOODU SMALL 10/26/2021    GLUCOSEU Negative 10/26/2021     ABG:  No results found for: PHART, DAT1QMI, PO2ART, Z0JLNLQC, VIF5YRY, BEART  TSH:    Lab Results   Component Value Date    TSH 1.050 12/03/2018     Cardiac Enzymes: No results found for: CKTOTAL, CKMB, CKMBINDEX, TROPONINI    Radiology: CT ABDOMEN PELVIS WO CONTRAST Additional Contrast? None    Result Date: 10/26/2021  EXAMINATION: CT OF THE ABDOMEN AND PELVIS WITHOUT CONTRAST 10/25/2021 10:45 pm TECHNIQUE: CT of the abdomen and pelvis was performed without the administration of intravenous contrast. Multiplanar reformatted images are provided for review. Dose modulation, iterative reconstruction, and/or weight based adjustment of the mA/kV was utilized to reduce the radiation dose to as low as reasonably achievable. COMPARISON: None. HISTORY: ORDERING SYSTEM PROVIDED HISTORY: GI bleed TECHNOLOGIST PROVIDED HISTORY: Reason for exam:->GI bleed Additional Contrast?->None FINDINGS: Lower Chest: Calcified granuloma in the right lower lobe. Chronic interstitial changes and pleuroparenchymal scarring in the lung bases. Small partially loculated left pleural effusion. Organs: Liver and spleen are within normal limits. Adrenals and pancreas within normal limits. Gallbladder within normal limits. Nonobstructive stones in the right kidney measuring less than 3 mm.   No obstructive uropathy. Bilateral perinephric stranding, nonspecific and which may be chronic. GI/Bowel: No evidence of bowel obstruction or free intraperitoneal air. No colitis or diverticulitis. Normal appendix. Sigmoid diverticulosis. Pelvis: Urinary bladder within normal limits. Peritoneum/Retroperitoneum: No abdominal aortic aneurysm or retroperitoneal adenopathy. Bones/Soft Tissues: No evidence of acute bony pathology. Nonobstructive stones in the right kidney measuring less than 3 mm. No obstructive uropathy. Bilateral perinephric stranding, nonspecific  and which may be chronic. Small partially loculated left pleural effusion. Chronic interstitial and pleuroparenchymal scarring in the lung bases. Sigmoid diverticulosis with no evidence of diverticulitis. CT head without contrast    Result Date: 10/25/2021  EXAMINATION: CT OF THE HEAD WITHOUT CONTRAST  10/25/2021 9:17 pm TECHNIQUE: CT of the head was performed without the administration of intravenous contrast. Dose modulation, iterative reconstruction, and/or weight based adjustment of the mA/kV was utilized to reduce the radiation dose to as low as reasonably achievable. COMPARISON: None. HISTORY: ORDERING SYSTEM PROVIDED HISTORY: AMS TECHNOLOGIST PROVIDED HISTORY: Reason for exam:->AMS Has a \"code stroke\" or \"stroke alert\" been called? ->No Decision Support Exception - unselect if not a suspected or confirmed emergency medical condition->Emergency Medical Condition (MA) FINDINGS: BRAIN/VENTRICLES: There is no acute intracranial hemorrhage, mass effect or midline shift. No abnormal extra-axial fluid collection. The gray-white differentiation is maintained without evidence of an acute infarct. There is prominence of the ventricles and sulci due to global parenchymal volume loss. There are nonspecific areas of hypoattenuation within the periventricular and subcortical white matter, which likely represent chronic microvascular ischemic change.  ORBITS: The visualized portion of the orbits demonstrate no acute abnormality. SINUSES: The visualized paranasal sinuses and mastoid air cells demonstrate no acute abnormality. SOFT TISSUES/SKULL: No acute abnormality of the visualized skull or soft tissues. No acute intracranial abnormality. XR CHEST PORTABLE    Result Date: 10/25/2021  EXAMINATION: ONE XRAY VIEW OF THE CHEST 10/25/2021 9:22 pm COMPARISON: 01/18/2019 HISTORY: ORDERING SYSTEM PROVIDED HISTORY: AMS TECHNOLOGIST PROVIDED HISTORY: Reason for exam:->AMS FINDINGS: There is no cardiomegaly or vascular congestion. There is some atelectasis versus infiltrate at the left lung base. There is no pleural effusion or pneumothorax seen. Atelectasis versus infiltrate at left lung base. EKG:  Sinus tachycardia with premature supraventricular complexes   Nonspecific ST abnormality   Abnormal ECG   No previous ECGs available   Assessment & Plan   ACTIVE hospital problems being addressed/reassessed for this admission:  Principal Problem:    Hemorrhagic complications of dental implant placement  Active Problems:    Anemia requiring transfusions    BPH (benign prostatic hyperplasia)  Resolved Problems:    * No resolved hospital problems. *    Code status/DVT prophylaxis and PLAN --see orders   Note extensive time spent coordinating care between ER docs, ER and floor nurses, and transitioning care over to day providers  Plan of care/ clinical impressions/communication specifics detailed below:    80 y.o. male    Known HTN, treated lyme disease 2 years ago - still on steroids, not on blood thinners  ONE week ago Sp dental implant surgery extensive spitting up blood and large ecchymosis extending around mouth and down neck. Over past few days , he continued to bleed profusely and ended up swallowing most of the blood. Including up to 2 days ago,.  After that, he noticed some darker stools  very lethargic and fatigued for the past couple days  very tired not moving from the couch  +fevers, rule out pneumonia aspiration vs dental infection   Also hypoxic in ER 88% RA- placed on NCo2    Pre syncopal.   Very weak, but unable to stand to urinate, complaining of uinary retention now, \"can/t pee in hand held urinal\" - needs to stand- likley BPH    New hypoxia - ? L sided infiltrate- admits to swallowing blood gagging  In ER hemoccult +  Low BP< inc HR  hgb dropped to 6.1  Transfuse blood  Give IV fluids 2 to TIERA, inc cr, FENA check  Hold diuretics  Check post void residual, catheter prn, flomax prn.     Treat for aspiration pneumoina- abx, home o2    gen surg [facility]--  Blood loss anemis sp dental implants sugery +neck bruising severe  Also  likely dental mouth bleed and bruising,  Acute blood loss +anemia  In ER noted +hemoccult stool- likley swallowed blood vs GIB ruleout    Aldo Brunner MD   Night Officer, overnight admitting doctor at [facility] call day time doctor   for questions after 7:30am    Covering for [address] [facility]  If Qs please call [phone]  Electronically signed by Concepcion Blandon MD on 10/26/2021 at 1:57 AM

## 2021-10-26 NOTE — ED PROVIDER NOTES
[address]      Pt Name: Iman Arnold  MRN: [id number]  [address] 1950  Date of evaluation: 10/25/2021      CHIEF COMPLAINT       Chief Complaint   Patient presents with    Altered Mental Status     began around 1500        HPI  Iman Arnold is a 80 y.o. male AMS and fatigue worsening today at 3pm. Wife at bedside states that he has been very lethargic and fatigued for the past couple days. Today he started having fevers measured at 102F by EMS. Wife states that patient had a oral surgery on Wednesday and since then he has not been tolerating much po. States he is not his normal self, now e has been in and out of consciousness and very tired not moving from the couch. Today states that she was not able to get him to get up and so she called EMS. On arrival EMS noted that he he was saturating 88% he was put on 2 L oxygen with improvement of saturation he also had a fever of 102F, no Tylenol given in route. With stated that he was very lethargic on the couch and they needed to carry him. Patient is alert to self and place but not time he is able to answer short answers and is denying any symptoms at this time and keeps dozing in and out of consciousness appears uncomfortable. He is not on any blood thinners, states that he has had increase in skin fragility since being put on prednisone. Except as noted above the remainder of the review of systems was reviewed and negative. Review of Systems   Unable to perform ROS: Mental status change        Physical Exam  Vitals reviewed. Constitutional:       General: He is not in acute distress. Appearance: Normal appearance. He is normal weight. He is ill-appearing. He is not toxic-appearing or diaphoretic. HENT:      Head: Normocephalic and atraumatic.       Right Ear: External ear normal.      Left Ear: External ear normal.      Nose: Nose normal. No congestion or rhinorrhea. Mouth/Throat:      Mouth: Mucous membranes are moist.      Pharynx: Oropharynx is clear. No oropharyngeal exudate or posterior oropharyngeal erythema. Eyes:      Extraocular Movements: Extraocular movements intact. Conjunctiva/sclera: Conjunctivae normal.      Pupils: Pupils are equal, round, and reactive to light. Cardiovascular:      Rate and Rhythm: Regular rhythm. Tachycardia present. Pulses: Normal pulses. Heart sounds: Normal heart sounds. Pulmonary:      Effort: Pulmonary effort is normal. No respiratory distress. Breath sounds: Rales present. No wheezing or rhonchi. Chest:      Chest wall: No tenderness. Abdominal:      General: Abdomen is flat. Bowel sounds are normal. There is no distension. Palpations: Abdomen is soft. Tenderness: There is no abdominal tenderness. There is no right CVA tenderness, left CVA tenderness or guarding. Hernia: No hernia is present. Genitourinary:     Rectum: Guaiac result positive. External hemorrhoid present. Musculoskeletal:      Cervical back: Normal range of motion. Right lower leg: No edema. Left lower leg: No edema. Skin:     General: Skin is warm and dry. Capillary Refill: Capillary refill takes less than 2 seconds. Findings: Abrasion, bruising and ecchymosis present. Comments: Bruising generalized throughout his body. Worse on his face, periorally and his left eye. around his neck, and perioral, some bruising around his L eye, chest wall on the right. B/l arms worst on the Left, with chronic skin fragility not actively bleeding. Neurological:      General: No focal deficit present. Mental Status: He is lethargic and confused. Psychiatric:         Mood and Affect: Mood is anxious. Thought Content:  Thought content normal.         Judgment: Judgment normal.          Procedures     MDM    80 y.o. male AMS and fatigue worsening today at 3pm. Wife at bedside states that he has been very lethargic and fatigued for the past couple days. Today he started having fevers measured at 102F by EMS. Wife states that patient had a oral surgery on Wednesday and since then he has not been tolerating much po. Physical exam remarkable for marked bruising on face, arms and chest. Hemoccult positive, blood noted on his mouth improved from after surgery. Patient alert to self and place not time, wife at bedside states he is not his normal self. Patient was not in any respiratory distress but appeared ill but febrile and tachycardic, otherwise HD stable. Ct head unremarkable,  Ct abd remarkable for nonobstructin ston in the right kidney measuring less than 3 mm. Small partially loculated left pleural effusion. Labs remarkable for hgb 6.1 and elevated BNP and trop but not trending up. EKG sinus tach no acute ischemia. While waiting for RBC transfusion patient started on fluids, protonix, toradol. Patient admitted to Medicine team for further management. ED Course as of Oct 26 0708   Tue Oct 26, 2021   0015 Patient back from CT, very uncomfortable, states he is unable to pee. He would like a hector placed. [TC]      ED Course User Sofi Matute MD       --------------------------------------------- PAST HISTORY ---------------------------------------------  Past Medical History:  has a past medical history of Hypertension and Lyme disease. Past Surgical History:  has a past surgical history that includes other surgical history. Social History:  reports that he quit smoking about 6 years ago. His smoking use included cigarettes. He smoked 0.50 packs per day. He has never used smokeless tobacco. He reports current alcohol use. He reports that he does not use drugs. Family History: family history is not on file. The patients home medications have been reviewed.     Allergies: Doxycycline    -------------------------------------------------- RESULTS 5.0 - 9.0    Protein, UA Negative Negative mg/dL    Urobilinogen, Urine 0.2 <2.0 E.U./dL    Nitrite, Urine Negative Negative    Leukocyte Esterase, Urine TRACE (A) Negative   Lactate, Sepsis   Result Value Ref Range    Lactic Acid, Sepsis 1.2 0.5 - 1.9 mmol/L   Lactate, Sepsis   Result Value Ref Range    Lactic Acid, Sepsis 1.5 0.5 - 1.9 mmol/L   Troponin   Result Value Ref Range    Troponin, High Sensitivity 63 (H) 0 - 11 ng/L   Brain Natriuretic Peptide   Result Value Ref Range    Pro- (H) 0 - 125 pg/mL   Troponin   Result Value Ref Range    Troponin, High Sensitivity 62 (H) 0 - 11 ng/L   Microscopic Urinalysis   Result Value Ref Range    WBC, UA 2-5 0 - 5 /HPF    RBC, UA 5-10 (A) 0 - 2 /HPF    Bacteria, UA RARE (A) None Seen /HPF   EKG 12 lead   Result Value Ref Range    Ventricular Rate 113 BPM    Atrial Rate 113 BPM    P-R Interval 164 ms    QRS Duration 78 ms    Q-T Interval 306 ms    QTc Calculation (Bazett) 419 ms    P Axis 94 degrees    R Axis 6 degrees    T Axis 70 degrees   TYPE AND SCREEN   Result Value Ref Range    ABO/Rh O POS     Antibody Screen NEG    PREPARE RBC (CROSSMATCH), 2 Units   Result Value Ref Range    Product Code Blood Bank [id number]     Description Blood Bank Red Blood Cells, Apheresis, Leuko-reduced     Unit Number [id number]     Dispense Status Blood Bank issued     Product Code Blood Bank [id number]     Description Blood Bank Red Blood Cells, Leuko-reduced     Unit Number [id number]     Dispense Status Blood Bank issued        RADIOLOGY:  CT ABDOMEN PELVIS WO CONTRAST Additional Contrast? None   Final Result   Nonobstructive stones in the right kidney measuring less than 3 mm. No   obstructive uropathy. Bilateral perinephric stranding, nonspecific  and   which may be chronic. Small partially loculated left pleural effusion. Chronic interstitial and   pleuroparenchymal scarring in the lung bases. Sigmoid diverticulosis with no evidence of diverticulitis. CT head without contrast   Final Result   No acute intracranial abnormality. XR CHEST PORTABLE   Final Result   Atelectasis versus infiltrate at left lung base. EKG:  This EKG is signed and interpreted by me. Rate: 113  Rhythm: Sinus  Interpretation: sinus tachycardia  Comparison: stable as compared to patient's most recent EKG      ------------------------- NURSING NOTES AND VITALS REVIEWED ---------------------------  Date / Time Roomed:  10/25/2021  8:59 PM  ED Bed Assignment:  09/09    The nursing notes within the ED encounter and vital signs as below have been reviewed.      Patient Vitals for the past 24 hrs:   BP Temp Temp src Pulse Resp SpO2 Height Weight   10/26/21 0619 (!) 119/54 99 °F (37.2 °C) Oral 121 20 96 % -- --   10/26/21 0517 125/73 98.9 °F (37.2 °C) Oral 102 16 98 % -- --   10/26/21 0508 133/73 98.8 °F (37.1 °C) Oral 88 18 94 % -- --   10/26/21 0439 108/60 99.3 °F (37.4 °C) Oral 110 18 97 % -- --   10/26/21 0404 108/60 99.4 °F (37.4 °C) Oral 109 18 97 % -- --   10/26/21 0343 120/74 -- -- 102 20 96 % -- --   10/26/21 0205 101/61 99 °F (37.2 °C) Axillary 104 -- 97 % -- --   10/26/21 0126 104/69 99.1 °F (37.3 °C) Axillary 97 21 97 % -- --   10/26/21 0015 -- 98.2 °F (36.8 °C) Oral -- -- -- -- --   10/25/21 2348 -- -- -- 116 -- -- -- --   10/25/21 2304 104/60 -- -- 116 22 95 % -- --   10/25/21 2242 (!) 146/67 -- -- 113 22 95 % -- --   10/25/21 2207 -- 100.5 °F (38.1 °C) Oral -- -- -- -- --   10/25/21 2115 127/71 99.4 °F (37.4 °C) -- 111 22 99 % 5' 8\" (1.727 m) 151 lb (68.5 kg)       Oxygen Saturation Interpretation: Normal    ------------------------------------------ PROGRESS NOTES ------------------------------------------  Re-evaluation(s):  Time: 0930  Patients symptoms show no change  Repeat physical examination is not changed    Counseling:  I have spoken with the patient and discussed todays results, in addition to providing specific details for the plan of care and counseling regarding the diagnosis and prognosis. Their questions are answered at this time and they are agreeable with the plan of admission.    --------------------------------- ADDITIONAL PROVIDER NOTES ---------------------------------  Consultations:  Time: 0483. Spoke with [facility].  Discussed case. They will admit the patient. This patient's ED course included: a personal history and physicial examination, re-evaluation prior to disposition, multiple bedside re-evaluations, IV medications, cardiac monitoring and continuous pulse oximetry    This patient has remained hemodynamically stable during their ED course. Diagnosis:  1. Anemia requiring transfusions    2. Altered mental status, unspecified altered mental status type        Disposition:  Patient's disposition: Admit to telemetry  Patient's condition is stable.            Brown Lynn MD  Resident  10/26/21 0782

## 2021-10-26 NOTE — CONSULTS
GENERAL SURGERY  CONSULT NOTE  10/26/2021    Physician Consulted: Dr. Basia Bain  Reason for Consult: Anemia, FOBT+  Referring Physician: Dr. Deonte De La Paz    ISMAEL Brothers is a 80 y.o. male with PMHx of HTN, Lyme disease, who presented to the ED roughly one week after dental implants with AMS and fatigue. He was found to have Hgb 6.1. Patient states that since he had his dental implants, he continued to bleed profusely and ended up swallowing most of the blood. After that, he noticed some darker stools. Prior to that, he had no issues with dark stools or bright red blood per rectum. Denies any nausea, vomiting, or heartburn symptoms. Prior colonoscopy in 2016, unremarkable. Reportedly FOBT+. Past Medical History:   Diagnosis Date    Hypertension     Lyme disease 2018       Past Surgical History:   Procedure Laterality Date    OTHER SURGICAL HISTORY      dental implant       Medications Prior to Admission:    Prior to Admission medications    Medication Sig Start Date End Date Taking? Authorizing Provider   ibuprofen (ADVIL;MOTRIN) 200 MG tablet Take 200 mg by mouth every 6 hours as needed for Pain   Yes Historical Provider, MD   HYDROcodone-acetaminophen (NORCO)  MG per tablet Take 1 tablet by mouth every 8 hours as needed for Pain. Yes Historical Provider, MD   predniSONE (DELTASONE) 10 MG tablet Take 10 mg by mouth every morning   Yes Historical Provider, MD   predniSONE (DELTASONE) 1 MG tablet Take 1 mg by mouth daily Take 4 tablets daily in the afternoon   Yes Historical Provider, MD   lisinopril-hydrochlorothiazide (PRINZIDE;ZESTORETIC) 20-12.5 MG per tablet Take 1 tablet by mouth daily   Yes Historical Provider, MD   ondansetron (ZOFRAN-ODT) 4 MG disintegrating tablet Take 4 mg by mouth every 8 hours as needed for Nausea or Vomiting    Historical Provider, MD       Allergies   Allergen Reactions    Doxycycline        History reviewed. No pertinent family history.     Social History     Tobacco Use    Smoking status: Former Smoker     Packs/day: 0.50     Types: Cigarettes     Quit date:      Years since quittin.8    Smokeless tobacco: Never Used   Substance Use Topics    Alcohol use: Yes     Comment: occasionally    Drug use: No         Review of Systems   General ROS: positive for  - fatigue  Hematological and Lymphatic ROS: negative  Respiratory ROS: negative  Cardiovascular ROS: negative  Gastrointestinal ROS: negative  Genito-Urinary ROS: negative  Musculoskeletal ROS: negative      PHYSICAL EXAM:    Vitals:    10/26/21 0439   BP: 108/60   Pulse: 110   Resp: 18   Temp: 99.3 °F (37.4 °C)   SpO2: 97%       General Appearance:  well developed, well nourished  Skin: Facial ecchymosis and swelling  Head/face:  Facial ecchymosis and swelling   Eyes:  No gross abnormalities. Lungs:  Normal respiratory effort on room air  Heart:  Heart regular rate and rhythm  Abdomen:  Soft, non-tender, non-distended, no rebound or guarding  Extremities: Extremities warm to touch, pink, with no edema. Male Rectal:  Rectal exam deferred. LABS:  CBC  Recent Labs     10/25/21  2149   WBC 10.0   HGB 6.1*   HCT 18.7*        BMP  Recent Labs     10/25/21  2149   *   K 4.8      CO2 19*   BUN 58*   CREATININE 2.0*   CALCIUM 8.4*     Liver Function  Recent Labs     10/25/21  2149   BILITOT 0.5   AST 42*   ALT 11   ALKPHOS 76   PROT 6.1*   LABALBU 2.8*     No results for input(s): LACTATE in the last 72 hours. No results for input(s): INR, PTT in the last 72 hours. Invalid input(s): PT    RADIOLOGY  CT ABDOMEN PELVIS WO CONTRAST Additional Contrast? None    Result Date: 10/26/2021  EXAMINATION: CT OF THE ABDOMEN AND PELVIS WITHOUT CONTRAST 10/25/2021 10:45 pm TECHNIQUE: CT of the abdomen and pelvis was performed without the administration of intravenous contrast. Multiplanar reformatted images are provided for review.  Dose modulation, iterative reconstruction, and/or weight based adjustment of the mA/kV was utilized to reduce the radiation dose to as low as reasonably achievable. COMPARISON: None. HISTORY: ORDERING SYSTEM PROVIDED HISTORY: GI bleed TECHNOLOGIST PROVIDED HISTORY: Reason for exam:->GI bleed Additional Contrast?->None FINDINGS: Lower Chest: Calcified granuloma in the right lower lobe. Chronic interstitial changes and pleuroparenchymal scarring in the lung bases. Small partially loculated left pleural effusion. Organs: Liver and spleen are within normal limits. Adrenals and pancreas within normal limits. Gallbladder within normal limits. Nonobstructive stones in the right kidney measuring less than 3 mm. No obstructive uropathy. Bilateral perinephric stranding, nonspecific and which may be chronic. GI/Bowel: No evidence of bowel obstruction or free intraperitoneal air. No colitis or diverticulitis. Normal appendix. Sigmoid diverticulosis. Pelvis: Urinary bladder within normal limits. Peritoneum/Retroperitoneum: No abdominal aortic aneurysm or retroperitoneal adenopathy. Bones/Soft Tissues: No evidence of acute bony pathology. Nonobstructive stones in the right kidney measuring less than 3 mm. No obstructive uropathy. Bilateral perinephric stranding, nonspecific  and which may be chronic. Small partially loculated left pleural effusion. Chronic interstitial and pleuroparenchymal scarring in the lung bases. Sigmoid diverticulosis with no evidence of diverticulitis. CT head without contrast    Result Date: 10/25/2021  EXAMINATION: CT OF THE HEAD WITHOUT CONTRAST  10/25/2021 9:17 pm TECHNIQUE: CT of the head was performed without the administration of intravenous contrast. Dose modulation, iterative reconstruction, and/or weight based adjustment of the mA/kV was utilized to reduce the radiation dose to as low as reasonably achievable. COMPARISON: None.  HISTORY: ORDERING SYSTEM PROVIDED HISTORY: AMS TECHNOLOGIST PROVIDED HISTORY: Reason for exam:->AMS Has a \"code stroke\" or \"stroke alert\" been called? ->No Decision Support Exception - unselect if not a suspected or confirmed emergency medical condition->Emergency Medical Condition (MA) FINDINGS: BRAIN/VENTRICLES: There is no acute intracranial hemorrhage, mass effect or midline shift. No abnormal extra-axial fluid collection. The gray-white differentiation is maintained without evidence of an acute infarct. There is prominence of the ventricles and sulci due to global parenchymal volume loss. There are nonspecific areas of hypoattenuation within the periventricular and subcortical white matter, which likely represent chronic microvascular ischemic change. ORBITS: The visualized portion of the orbits demonstrate no acute abnormality. SINUSES: The visualized paranasal sinuses and mastoid air cells demonstrate no acute abnormality. SOFT TISSUES/SKULL: No acute abnormality of the visualized skull or soft tissues. No acute intracranial abnormality. XR CHEST PORTABLE    Result Date: 10/25/2021  EXAMINATION: ONE XRAY VIEW OF THE CHEST 10/25/2021 9:22 pm COMPARISON: 01/18/2019 HISTORY: ORDERING SYSTEM PROVIDED HISTORY: AMS TECHNOLOGIST PROVIDED HISTORY: Reason for exam:->AMS FINDINGS: There is no cardiomegaly or vascular congestion. There is some atelectasis versus infiltrate at the left lung base. There is no pleural effusion or pneumothorax seen. Atelectasis versus infiltrate at left lung base. ASSESSMENT:  80 y.o. male with anemia and FOBT+, likely secondary to large amount of swallowed blood from recent dental procedure. No concern for active GI bleed at this time.      PLAN:  - Okay for diet from surgical POV  - No plans for scopes at this time  - Continue to monitor H/H  - PPI/Carafate  - If patient's Hgb continues to drop significantly or clinically worsens then will consider EGD  - Discussed with Dr. Dalton Aggarwal    Electronically signed by Kassie Ferreira MD on 10/26/21 at 4:54 AM EDT

## 2021-10-26 NOTE — PROGRESS NOTES
Per wife at the bedside, patient had bilateral oral implants last wednesday 10/20, oral bruising, swelling around neck, mouth and Rt eye since then.

## 2021-10-26 NOTE — PROGRESS NOTES
Chart reviewed , patient seen / examined. I have d/w night physician. Admitted with status post dental implant surgery with large ecchymosis extending right side face mandible mouth and neck, also spitting up blood, swallowing blood for last 2 days prior to arrival, was hypoxic on arrival to ER 88% on room air, required 2 L nasal cannula, was weaned off to room air. Also had fever in ER and continues with low-grade fever. Admitted with-    Acute blood loss symptomatic anemia requiring 2 pack RBC. Likely secondary to large amount of bleeding following dental implant,  swallowing large amount of swallowed blood from recent dental procedure, FOBT positive secondary to this. General surgery consulted and following. As per general surgery okay to add diet. No plans of scope. Continue PPI and Carafate. Monitor H&H. Rt facial ecchymosis/hematoma extending around mouth and down to neck-monitor. As per surgery okay to add diet. He is not on any blood thinners. Fever/hypoxia on admission/CT with small partially loculated left pleural effusion, chronic interstitial and pleuroparenchymal scarring in the lung base. Concern about aspiration/pneumonia. Started on IV Unasyn. Pulmonary consulted with loculated pleural effusion. Urinary retention-continue to monitor. Check PVR. Follow. Hypertension-resume as per home  Lyme disease-resume as per home  On SCDs for DVT prophylaxis  Full code.

## 2021-10-26 NOTE — ED NOTES
Bed: 09  Expected date:   Expected time:   Means of arrival:   Comments:  350 Bala Campa RN  10/25/21 2059

## 2021-10-26 NOTE — CONSULTS
Pulmonary Consultation    Admit Date: 10/25/2021  Requesting Physician: Fred Shepherd MD    Reason for consultation:  · Hypoxia  HPI:  · Jaison Chang is a 71-year-old male presented to the emergency room today with shortness of breath as well as extensive swelling on the right side of his face. The patient notes that he underwent bilateral dental implants both on the right and left side of his face. While the left side seemed to do well, the right side continued to bleed for days on end. Cornea the patient, he would swallow the blood and eventually began having dark bowel movements. Upon presentation, his hemoglobin was 6. The patient has no pre-existing pulmonary diseases. He was noted to be hypoxic upon presentation but was eventually weaned off of supplemental oxygen. Of note is the patient's platelet count being normal as well as his INR. He is on no antiplatelet agents or blood thinners. PMH:    Past Medical History:   Diagnosis Date    Hypertension     Lyme disease 2018     PSH:   Past Surgical History:   Procedure Laterality Date    OTHER SURGICAL HISTORY      dental implant       Review of Systems:    · Constitutional: As noted in the HPI. · Eyes: No visual changes or diplopia. No scleral icterus. · ENT: No headaches, hearing loss or vertigo. No nasal congestion, or sore throat. · Cardiovascular: No chest pain, dyspnea on exertion, or palpitations. · Respiratory: See above  · Gastrointestinal: No abdominal pain, nausea or emesis. No diarrhea or rectal bleeding or melena. No change in bowel habits. · Genitourinary: No dysuria, urinary frequency, or incontinence. No hematuria. · Musculoskeletal: No gait disturbance, weakness or joint complaints. · Integumentary: No rash or pruritis. No abnormal pigmentation, hair or nail changes. · Neurological: No headache, diplopia, dizziness, tremor, change in muscle strength, numbness or tingling.  No change in gait, balance, coordination, mood, affect, memory, mentation, behavior. · Psychiatric: No anxiety or depression. · Endocrine: No temperature intolerance, excessive thirst, fluid intake, urinary frequency, excessive appetite, or recent weight change. · Hematologic/Lymphatic: No abnormal bruising or bleeding, blood clots or swollen lymph nodes. No anemia, fever, chills, night sweats, or swollen glands. · Allergic/Immunologic: No seasonal or perenial allergies. No history of hives or atopic dermatitis. Social History:  · Alcohol:  No  · Tobacco:   Quit in the past  · Employment:  no silica or asbestos exposure  · Family:  No family history of lung disease    Medications:   sodium chloride      sodium chloride        sodium chloride flush  5-40 mL IntraVENous 2 times per day    lisinopril  20 mg Oral Daily    And    hydroCHLOROthiazide  12.5 mg Oral Daily    amoxicillin-clavulanate  1 tablet Oral 3 times per day    [START ON 10/27/2021] pantoprazole  40 mg IntraVENous Daily       Vitals:  Tmax:  VITALS:  /69   Pulse 114   Temp 98 °F (36.7 °C) (Oral)   Resp 20   Ht 5' 8\" (1.727 m)   Wt 151 lb (68.5 kg)   SpO2 95%   BMI 22.96 kg/m²   24HR INTAKE/OUTPUT:      Intake/Output Summary (Last 24 hours) at 10/26/2021 1737  Last data filed at 10/26/2021 0935  Gross per 24 hour   Intake 791.66 ml   Output 1800 ml   Net -1008.34 ml     CURRENT PULSE OXIMETRY:  SpO2: 95 %  24HR PULSE OXIMETRY RANGE:  SpO2  Av.3 %  Min: 94 %  Max: 99 %    EXAM:  General: No distress. Alert. Eyes: PERRL. No sclera icterus. No conjunctival injection. ENT: No discharge. Pharynx clear. Neck: Trachea midline. Normal thyroid. No jvd, no hjr. Resp: No wheezing. No accessory muscle use. No rales. No rhonchi. CV: Regular rate. Regular rhythm. No murmur No rub. Abd: Non-tender. Non-distended. No masses. No organmegaly. Normal bowel sounds. Skin: Warm and dry. No nodule on exposed extremities.  No rash on exposed extremities. Lymph: No cervical LAD. No supraclavicular LAD. Ext: No joint deformity. No clubbing. No cyanosis. No edema  Neuro: Awake. Follows commands. Positive pupils/gag/corneals. Normal pain response. Lab Results:  CBC:   Recent Labs     10/25/21  2149 10/26/21  0921   WBC 10.0  --    HGB 6.1* 8.1*   HCT 18.7* 23.6*   MCV 96.9  --      --        BMP:  Recent Labs     10/25/21  2149   *   K 4.8      CO2 19*   BUN 58*   CREATININE 2.0*    ALB:3,BILIDIR:3,BILITOT:3,ALKPHOS:3)@    PT/INR:   Recent Labs     10/26/21  0921   PROTIME 12.0   INR 1.1       Cultures:  Sputum: not available  Blood: not available    ABG:   No results for input(s): PH, PO2, PCO2, HCO3, BE, O2SAT, METHB, O2HB, COHB, O2CON, HHB, THB in the last 72 hours. Films:     CT ABDOMEN PELVIS WO CONTRAST Additional Contrast? None   Final Result   Nonobstructive stones in the right kidney measuring less than 3 mm. No   obstructive uropathy. Bilateral perinephric stranding, nonspecific  and   which may be chronic. Small partially loculated left pleural effusion. Chronic interstitial and   pleuroparenchymal scarring in the lung bases. Sigmoid diverticulosis with no evidence of diverticulitis. CT head without contrast   Final Result   No acute intracranial abnormality. XR CHEST PORTABLE   Final Result   Atelectasis versus infiltrate at left lung base. .        Assessment:  1. Hypoxia: Changes noted at both lung bases suggest a modest amount of interstitial lung disease. 2. The left effusion appears as pleural thickening and scarring more so than fluid. Plan:  1. Would hold off on any instrumentation of the left effusion  2. Transfuse as needed      Thanks for letting us see this patient in consultation.   Total time in reviewing the previous admissions and records, reviewing the current x-rays, labs, and discussing with clinical staff including nursing and physicians, exceeded 50 minutes. Please contact us with any questions. Office [phone] or after hours through [employer], x [phone]. Please note that voice recognition technology was used (while wearing a Covid mandated mask) in the preparation of this note and make therefore it may contain inadvertent transcription errors. If the patient is a COVID 19 isolation patient, the above physical exam reflects that of the examining physician for the day. Juan Philip MD,  M.D., F.C.C.P.     [facility], [address] [facility], [address] [place] [address]

## 2021-10-27 LAB
ALBUMIN SERPL-MCNC: 2.6 G/DL (ref 3.5–5.2)
ALP BLD-CCNC: 68 U/L (ref 40–129)
ALT SERPL-CCNC: 36 U/L (ref 0–40)
ANION GAP SERPL CALCULATED.3IONS-SCNC: 12 MMOL/L (ref 7–16)
APTT: 141 SEC (ref 24.5–35.1)
AST SERPL-CCNC: 109 U/L (ref 0–39)
BASOPHILS ABSOLUTE: 0.01 E9/L (ref 0–0.2)
BASOPHILS RELATIVE PERCENT: 0.1 % (ref 0–2)
BILIRUB SERPL-MCNC: 0.8 MG/DL (ref 0–1.2)
BUN BLDV-MCNC: 42 MG/DL (ref 6–23)
CALCIUM SERPL-MCNC: 8.2 MG/DL (ref 8.6–10.2)
CHLORIDE BLD-SCNC: 106 MMOL/L (ref 98–107)
CO2: 18 MMOL/L (ref 22–29)
CREAT SERPL-MCNC: 1.2 MG/DL (ref 0.7–1.2)
EKG ATRIAL RATE: 113 BPM
EKG P AXIS: 94 DEGREES
EKG P-R INTERVAL: 164 MS
EKG Q-T INTERVAL: 306 MS
EKG QRS DURATION: 78 MS
EKG QTC CALCULATION (BAZETT): 419 MS
EKG R AXIS: 6 DEGREES
EKG T AXIS: 70 DEGREES
EKG VENTRICULAR RATE: 113 BPM
EOSINOPHILS ABSOLUTE: 0 E9/L (ref 0.05–0.5)
EOSINOPHILS RELATIVE PERCENT: 0 % (ref 0–6)
GFR AFRICAN AMERICAN: >60
GFR NON-AFRICAN AMERICAN: 60 ML/MIN/1.73
GLUCOSE BLD-MCNC: 86 MG/DL (ref 74–99)
HCT VFR BLD CALC: 22.3 % (ref 37–54)
HCT VFR BLD CALC: 22.4 % (ref 37–54)
HCT VFR BLD CALC: 22.5 % (ref 37–54)
HEMOGLOBIN: 7.4 G/DL (ref 12.5–16.5)
HEMOGLOBIN: 7.5 G/DL (ref 12.5–16.5)
HEMOGLOBIN: 7.6 G/DL (ref 12.5–16.5)
IMMATURE GRANULOCYTES #: 0.1 E9/L
IMMATURE GRANULOCYTES %: 1.2 % (ref 0–5)
INR BLD: 1.1
LACTIC ACID: 0.8 MMOL/L (ref 0.5–2.2)
LYMPHOCYTES ABSOLUTE: 0.67 E9/L (ref 1.5–4)
LYMPHOCYTES RELATIVE PERCENT: 8.4 % (ref 20–42)
MAGNESIUM: 1.6 MG/DL (ref 1.6–2.6)
MCH RBC QN AUTO: 32.2 PG (ref 26–35)
MCHC RBC AUTO-ENTMCNC: 33.8 % (ref 32–34.5)
MCV RBC AUTO: 95.3 FL (ref 80–99.9)
MONOCYTES ABSOLUTE: 0.47 E9/L (ref 0.1–0.95)
MONOCYTES RELATIVE PERCENT: 5.9 % (ref 2–12)
NEUTROPHILS ABSOLUTE: 6.76 E9/L (ref 1.8–7.3)
NEUTROPHILS RELATIVE PERCENT: 84.4 % (ref 43–80)
PDW BLD-RTO: 13.6 FL (ref 11.5–15)
PLATELET # BLD: 195 E9/L (ref 130–450)
PMV BLD AUTO: 10.9 FL (ref 7–12)
POTASSIUM REFLEX MAGNESIUM: 4.2 MMOL/L (ref 3.5–5)
PROTHROMBIN TIME: 12.5 SEC (ref 9.3–12.4)
RBC # BLD: 2.36 E12/L (ref 3.8–5.8)
SODIUM BLD-SCNC: 136 MMOL/L (ref 132–146)
TOTAL PROTEIN: 5.5 G/DL (ref 6.4–8.3)
WBC # BLD: 8 E9/L (ref 4.5–11.5)

## 2021-10-27 PROCEDURE — 97535 SELF CARE MNGMENT TRAINING: CPT

## 2021-10-27 PROCEDURE — 83605 ASSAY OF LACTIC ACID: CPT

## 2021-10-27 PROCEDURE — 36415 COLL VENOUS BLD VENIPUNCTURE: CPT

## 2021-10-27 PROCEDURE — 97165 OT EVAL LOW COMPLEX 30 MIN: CPT

## 2021-10-27 PROCEDURE — 2580000003 HC RX 258: Performed by: INTERNAL MEDICINE

## 2021-10-27 PROCEDURE — 80053 COMPREHEN METABOLIC PANEL: CPT

## 2021-10-27 PROCEDURE — 85018 HEMOGLOBIN: CPT

## 2021-10-27 PROCEDURE — 97530 THERAPEUTIC ACTIVITIES: CPT

## 2021-10-27 PROCEDURE — 6360000002 HC RX W HCPCS: Performed by: INTERNAL MEDICINE

## 2021-10-27 PROCEDURE — 93010 ELECTROCARDIOGRAM REPORT: CPT | Performed by: INTERNAL MEDICINE

## 2021-10-27 PROCEDURE — 99232 SBSQ HOSP IP/OBS MODERATE 35: CPT | Performed by: INTERNAL MEDICINE

## 2021-10-27 PROCEDURE — 92526 ORAL FUNCTION THERAPY: CPT

## 2021-10-27 PROCEDURE — 85025 COMPLETE CBC W/AUTO DIFF WBC: CPT

## 2021-10-27 PROCEDURE — 85014 HEMATOCRIT: CPT

## 2021-10-27 PROCEDURE — C9113 INJ PANTOPRAZOLE SODIUM, VIA: HCPCS | Performed by: INTERNAL MEDICINE

## 2021-10-27 PROCEDURE — 83735 ASSAY OF MAGNESIUM: CPT

## 2021-10-27 PROCEDURE — 85610 PROTHROMBIN TIME: CPT

## 2021-10-27 PROCEDURE — 99222 1ST HOSP IP/OBS MODERATE 55: CPT | Performed by: SURGERY

## 2021-10-27 PROCEDURE — 97161 PT EVAL LOW COMPLEX 20 MIN: CPT

## 2021-10-27 PROCEDURE — 92610 EVALUATE SWALLOWING FUNCTION: CPT

## 2021-10-27 PROCEDURE — 85730 THROMBOPLASTIN TIME PARTIAL: CPT

## 2021-10-27 PROCEDURE — 6370000000 HC RX 637 (ALT 250 FOR IP): Performed by: INTERNAL MEDICINE

## 2021-10-27 PROCEDURE — 2060000000 HC ICU INTERMEDIATE R&B

## 2021-10-27 RX ADMIN — Medication 10 ML: at 10:00

## 2021-10-27 RX ADMIN — HYDROCHLOROTHIAZIDE 12.5 MG: 12.5 TABLET ORAL at 10:00

## 2021-10-27 RX ADMIN — LISINOPRIL 20 MG: 20 TABLET ORAL at 10:00

## 2021-10-27 RX ADMIN — Medication 10 ML: at 22:32

## 2021-10-27 RX ADMIN — PANTOPRAZOLE SODIUM 40 MG: 40 INJECTION, POWDER, FOR SOLUTION INTRAVENOUS at 10:00

## 2021-10-27 RX ADMIN — AMOXICILLIN AND CLAVULANATE POTASSIUM 1 TABLET: 500; 125 TABLET, FILM COATED ORAL at 22:32

## 2021-10-27 RX ADMIN — AMOXICILLIN AND CLAVULANATE POTASSIUM 1 TABLET: 500; 125 TABLET, FILM COATED ORAL at 14:00

## 2021-10-27 RX ADMIN — ACETAMINOPHEN 650 MG: 325 TABLET ORAL at 22:36

## 2021-10-27 RX ADMIN — AMOXICILLIN AND CLAVULANATE POTASSIUM 1 TABLET: 500; 125 TABLET, FILM COATED ORAL at 05:26

## 2021-10-27 ASSESSMENT — PAIN SCALES - GENERAL: PAINLEVEL_OUTOF10: 0

## 2021-10-27 NOTE — PROGRESS NOTES
Physical Therapy    Facility/Department: [address] INTERMEDIATE  Initial Assessment    NAME: Vera Kenyon  : 1950  MRN: [id number]    Date of Service: 10/27/2021       REQUIRES PT FOLLOW UP: Yes       Patient Diagnosis(es): The primary encounter diagnosis was Anemia requiring transfusions. A diagnosis of Altered mental status, unspecified altered mental status type was also pertinent to this visit. has a past medical history of Hypertension and Lyme disease. has a past surgical history that includes other surgical history. Evaluating Therapist: Yamil Ayers PT     Referring Provider:  Lilly Mary MD    PT order : PT eval and treat     Room #:  629   DIAGNOSIS: anemia , AMS   PRECAUTIONS: falls      Social:  Pt lives with  Wife  in a  1-1/2  floor plan  With first floor set up, 1  step to enter. 1 step into sunken living room   Prior to admission pt walked with no AD, independent, works      Initial Evaluation  Date:  10/27/2021  Treatment      Short Term/ Long Term   Goals   Was pt agreeable to Eval/treatment? yes      Does pt have pain?  mouth, butt      Bed Mobility  Rolling: NT   Supine to sit:  Max assist   Sit to supine:  NT   Scooting: Mod assist in sit   SBA    Transfers Sit to stand:  Min/mod assist   Stand to sit:  Min assist   Stand pivot:  NT   SBA    Ambulation     15 and 45  feet with  ww  with  CGA/min assist . 10 feet with no AD mod assist    150  feet with  AAD  with  SBA        Stair negotiation: ascended and descended NT    1-2  steps with  B  rail with  SBA   LE ROM  WFL     LE strength  3+ to 4-/ 5   4/5    AM- PAC RAW score   15/ 24            Pt is alert and Oriented x  3/ slow processing noted      Balance:  CGA/min assist with gait.  High fall risk due to decreased balance and LE weakness   Edema; B LEs   Endurance:  Decreased   Bed/Chair alarm: no chair alarms available      ASSESSMENT  Pt displays functional ability as noted in the objective portion of this evaluation. Conditions Requiring Skilled Therapeutic Intervention:    [x]Decreased strength     []Decreased ROM  []Decreased functional mobility  [x]Decreased balance   [x]Decreased endurance   [x]Decreased posture  []Decreased sensation  []Decreased coordination   []Decreased vision  []Decreased safety awareness   [x]Increased pain       Treatment/Education:    Pt in bed upon arrival ; agreeable to PT. Pt's wife present. Cues for technique with bed mobility, hand placement with transfers, and ww safety with gait. Pt very slow with all mobility, constant cues for correct ww use and to continue walking. Pt would stop while performing tasks and required cues to continue. Pt's wife concerned about increased bruising on LEs legs. RN aware. Pt educated on fall risk,  Safety with mobility       Patient response to education:   Pt verbalized understanding Pt demonstrated skill Pt requires further education in this area   x Needs cues/assist  X        Comments:  Pt left in chair after session, with call light in reach. Waffle cushion placed       Rehab potential is Good for reaching above PT goals. Pts/ family goals   1. Return to PLOF     Patient and or family understand(s) diagnosis, prognosis, and plan of care. -  yes     PLAN  PT care will be provided in accordance with the objectives noted above. Whenever appropriate, clear delegation orders will be provided for nursing staff. Exercises and functional mobility practice will be used as well as appropriate assistive devices or modalities to obtain goals. Patient and family education will also be administered as needed.         PLAN OF CARE:    Current Treatment Recommendations     [x] Strengthening to improve independence with functional mobility   [] ROM to improve independence with functional mobility   [x] Balance Training to improve static/dynamic balance and to reduce fall risk  [x] Endurance Training to improve activity tolerance during functional mobility   [x] Transfer Training to improve safety and independence with all functional transfers   [x] Gait Training to improve gait mechanics, endurance and assess need for appropriate assistive device  [x] Stair Training in preparation for safe discharge home and/or into the community   [x] Positioning to prevent skin breakdown and contractures  [x] Safety and Education Training   [x] Patient/Caregiver Education   [] HEP  [] Other     Frequency of treatments will be 2-5x/week x 7-10 days. Time in: 1421   Time out:  1505       Evaluation Time includes thorough review of current medical information, gathering information on past medical history/social history and prior level of function, completion of standardized testing/informal observation of tasks, assessment of data and education on plan of care and goals.     CPT codes:  [x] Low Complexity PT evaluation 33858  [] Moderate Complexity PT evaluation 20104  [] High Complexity PT evaluation 79374  [] PT Re-evaluation 12633  [] Gait training 95357  minutes  [x] Therapeutic activities 21774 20 minutes  [] Therapeutic exercises 74544  minutes  [] Neuromuscular reeducation 16536  minutes       Mariela Beth number:  PT [id number]

## 2021-10-27 NOTE — PROGRESS NOTES
[facility] Quality Flow/Interdisciplinary Rounds Progress Note        Quality Flow Rounds held on October 27, 2021    Disciplines Attending:  Bedside Nurse, ,  and Nursing Unit Leadership    Agus Mejia was admitted on 10/25/2021  8:59 PM    Anticipated Discharge Date:  Expected Discharge Date: 10/31/21    Disposition:    Damion Score:  Damion Scale Score: 20    Readmission Risk              Risk of Unplanned Readmission:  14           Discussed patient goal for the day, patient clinical progression, and barriers to discharge.   The following Goal(s) of the Day/Commitment(s) have been identified:  Bedside swallow study,monitor hemoglobin      Jannette Perez RN  October 27, 2021

## 2021-10-27 NOTE — PROGRESS NOTES
Patient is more alert this morning and oriented x 4. Slept intermittently throughout the night. Used call bell appropriately. Afebrile this morning.

## 2021-10-27 NOTE — PROGRESS NOTES
Occupational Therapy  OCCUPATIONAL THERAPY INITIAL EVALUATION     [facility] [place] [facility], [place] [facility]                                                  Patient Name: Robert Yost    MRN: [id number]    : 1950    Room: [address] [place] [address]      Evaluating OT: Shar Gardner OTR/L [id number]      Referring Provider: Nayana Mercado MD    Specific Provider Orders/Date: OT eval and treat 10/27/21      Diagnosis: Anemia requiring transfusions [D64.9]  Altered mental status, unspecified altered mental status type [R41.82]      Pertinent Medical History: HTN, lyme disease      Precautions:  Fall Risk, confused, alarm     Assessment of current deficits    [x] Functional mobility  [x]ADLs  [x] Strength               [x]Cognition    [x] Functional transfers   [x] IADLs         [x] Safety Awareness   [x]Endurance    [] Fine Coordination              [x] Balance      [] Vision/perception   []Sensation     []Gross Motor Coordination  [] ROM  [] Delirium                   [] Motor Control     OT PLAN OF CARE   OT POC based on physician orders, patient diagnosis and results of clinical assessment    Frequency/Duration 2-4 days/wk for 2 weeks PRN   Specific OT Treatment Interventions to include:   * Instruction/training on adapted ADL techniques and AE recommendations to increase functional independence within precautions       * Training on energy conservation strategies, correct breathing pattern and techniques to improve independence/tolerance for self-care routine  * Functional transfer/mobility training/DME recommendations for increased independence, safety, and fall prevention  * Patient/Family education to increase follow through with safety techniques and functional independence  * Recommendation of environmental modifications for increased safety with functional transfers/mobility and ADLs  * Cognitive retraining/development of therapeutic activities to improve problem solving, judgement, memory, and attention for increased safety/participation in ADL/IADL tasks  * Therapeutic exercise to improve motor endurance, ROM, and functional strength for ADLs/functional transfers  * Therapeutic activities to facilitate/challenge dynamic balance, stand tolerance for increased safety and independence with ADLs        Recommended Adaptive Equipment: TBD     Home Living: Pt lives with wife in 2 story home with 1 KEY, and no rails. Pt's bedroom and bathroom are on the first floor. Bathroom setup: walk-in shower with built in shower seat, grab bars   Equipment owned: none    Prior Level of Function: independent with ADLs , independent with IADLs; functional mobility: with no device  Driving: yes  Occupation: works in coal mine    Pain Level: Pt reported dry mouth and pain in buttocks but did not rate. Pt with bruising on face, neck, BUE and BLE  Cognition: A&O: 4/4; 2-3 step command follow demonstrated. Pt pleasant and motivated but confused at times. Memory:  Fair-   Sequencing: Fair-   Problem solving:  Fair-   Judgement/safety:  Fair-     Functional Assessment:  AM-PAC Daily Activity Raw Score: 15/24   Initial Eval Status  Date: 10/27/21 Treatment Status  Date: STGs = LTGs  Time frame: 10-14 days   Feeding I     Grooming CGA to for standing balance to stand at sink for hand hygiene  SUP   UB Dressing CGA/SBA  SUP   LB Dressing Min A to don socks over heel   SUP-with use of AD as appropriate/needed   Bathing Min A  SUP -with use of AD as appropriate/needed   Toileting Min A for thorough hygiene  SUP   Bed Mobility  Supine to sit:  Max A        Functional Transfers Mod A with wheeled walker  Sit to stand from EOB  Sit<>stand from commode  Stand to sit to chair  SBA    Functional Mobility Min A with wheeled walker to and from bathroom  SBA -with device as needed to maximize independence with ADLs and functional task completion   Balance Sitting:     Static: SBA    Dynamic:CGA/SBA  Standing: CGA   SBA for standing balance to maximize independence with ADLs and functional task completion   Activity Tolerance Fair with ADL activity  Good with ADL activity. Pt will demonstrate good understanding of education provided on EC/WS techniques    Visual/  Perceptual Glasses: none at bedside                Additional long-term goal: Pt will increase functional independence to PLOF to allow pt to live in least restrictive environment. Hand Dominance R   AROM (PROM) Strength Additional Info:    RUE  WFL WFL  and FMC/dexterity grossly WFL noted during ADL tasks       [facility] WFL  and FMC/dexterity grossly WFL noted during ADL tasks     Hearing: WFL   Sensation:  No c/o numbness or tingling   Tone: WFL   Edema: BLE    Comments: Upon arrival patient lying in bed. At end of session, patient seated in chair with call light and phone within reach, all lines and tubes intact. Overall patient demonstrated decreased independence and safety during completion of ADL/functional transfer/mobility tasks. Pt would benefit from continued skilled OT to increase safety and independence with completion of ADL/IADL tasks for functional independence and quality of life. Treatment: OT treatment provided this date includes:    Instruction/training on safety and adapted techniques for completion of ADLs: Pt sat at EOB and donned socks with Min A to assist over the heel and increased time. Pt was Mod A to sit on commode with safety and control with cues for hand placement. Pt required increased time for pericare and Min A for thorough hygiene. Pt had tendency to lean forward and required cues for safety and to use grab bar when needed. Pt stood from toilet with Mod A and walked to sink with Min A for hand hygiene. Pt stood at sink with CGA and perseverated on hand washing and required cues to complete. Additional assistance needed.  Pt accidentally peed on floor d/t positioning on the commode. Assisted with cleanup.   Instruction/training on safe functional mobility/transfer techniques: Pt was Min A to walk with wheeled walker to and from bathroom and required increased times and cues to continue walking. Pt educated on safe wheeled walker management and navigation for improve safety and participation in ADLs and functional tasks. Rehab Potential: Good for established goals     Patient / Family Goal: none stated    Patient and/or family were instructed on functional diagnosis, prognosis/goals and OT plan of care. Demonstrated fair understanding. Eval Complexity: Low    Time In: 1422  Time Out: 1505  Total Treatment Time: 20    Min Units   OT Eval Low 97165  x  1   OT Eval Medium 21840      OT Eval High 62635      OT Re-Eval [id number]       Therapeutic Ex 54456       Therapeutic Activities 67535       ADL/Self Care 14065  20  1   Orthotic Management 63805       Manual 46811     Neuro Re-Ed 92133       Non-Billable Time  8        Evaluation Time additionally includes thorough review of current medical information, gathering information on past medical history/social history and prior level of function, interpretation of standardized testing/informal observation of tasks, assessment of data and development of plan of care and goals.             Herman Molina, OTR/L, [id number]

## 2021-10-27 NOTE — PLAN OF CARE
Problem: Falls - Risk of:  Goal: Will remain free from falls  Description: Will remain free from falls  Outcome: Met This Shift  Goal: Absence of physical injury  Description: Absence of physical injury  Outcome: Met This Shift     Problem: Confusion - Acute:  Goal: Absence of continued neurological deterioration signs and symptoms  Description: Absence of continued neurological deterioration signs and symptoms  Outcome: Met This Shift  Goal: Mental status will be restored to baseline  Description: Mental status will be restored to baseline  Outcome: Met This Shift

## 2021-10-27 NOTE — PROGRESS NOTES
SPEECH/LANGUAGE PATHOLOGY  CLINICAL ASSESSMENT OF SWALLOWING FUNCTION   and PLAN OF CARE    PATIENT NAME:  Hannah Alejo  (male)     MRN:  [id number]    :  1950  (80 y.o.)  STATUS:  Inpatient: Room 2929-A    TODAY'S DATE:  10/27/2021  REFERRING PROVIDER:   Dr. Alvarez Hallmark: SLP eval and treat Date of order:  10/27/21  REASON FOR REFERRAL: assess swallow function   EVALUATING THERAPIST: MICHELLE Ingram                 RESULTS:    DYSPHAGIA DIAGNOSIS:   Clinical indicators of moderate oropharyngeal phase dysphagia       DIET RECOMMENDATIONS:  Pureed consistency solids (IDDSI level 4) with  honey consistency (moderately thick - IDDSI level 3)  Liquids    If patient is coughing on medications with HTL, recommend pills to be crushed in applesauce      FEEDING RECOMMENDATIONS:     Assistance level:  Stand by assistance is needed during all oral intake      Compensatory strategies recommended: Small bites/sips, Alternate solids and liquids and No straw      Discussed recommendations with nursing and/or faxed report to referring provider: Yes    SPEECH THERAPY  PLAN OF CARE   The dysphagia POC is established based on physician order, dysphagia diagnosis and results of clinical assessment     Skilled SLP intervention for dysphagia management on acute care 3-5 x per week until goals met, pt plateaus in function and/or discharged from hospital  Meal time assessment for 1-2 sessions to provide diet modification and compensatory strategy implementation due to inconsistent episodes of clinical indicators of dysphagia during intake    Conditions Requiring Skilled Therapeutic Intervention for dysphagia:    Patient is performing below  functional baseline d/t  current acute condition, Multiple diagnoses, multiple medications, and increased dependency upon caregivers.   Reduced oral control of bolus   Oral motor strength/coordination impairment  Coughing during PO intake      Specific dysphagia interventions to include:     Compensatory strategy training   Trials of upgraded diet/liquid   Meal time assessment for 1-2 sessions to provide diet modification and compensatory strategy implementation due to inconsistent episodes of clinical indicators of dysphagia during intake    Specific instructions for next treatment:  ongoing PO analysis to upgrade diet and evaluate tolerance of current PO recommendation and initiate instruction of compensatory strategies  Patient Treatment Goals:    Short Term Goals:  Pt will complete PO trials of upgraded diet textures with SLP only to determine the least restrictive PO diet to maintain adequate nutrition/hydration with no more than 1 overt s/s of pen/asp. Pt will participate in meal time assessment for 1-2 sessions to provide diet modification and compensatory strategy implementation due to inconsistent episodes of clinical indicators of dysphagia during intake    Long Term Goals:   Pt will improve oropharyngeal swallow function to ensure airway protection during PO intake to maintain adequate nutrition/hydration and decrease signs/symptoms of aspiration to less than 1 x/day. Patient/family Goal:    To be able to [facility] discussed with Patient and Family   The Patient and Family understand(s) the diagnosis, prognosis and plan of care     Rehabilitation Potential/Prognosis: good                    ADMITTING DIAGNOSIS: Anemia requiring transfusions [D64.9]  Altered mental status, unspecified altered mental status type [R41.82]    VISIT DIAGNOSIS:   Visit Diagnoses       Codes    Altered mental status, unspecified altered mental status type     R41.82           PATIENT REPORT/COMPLAINT: edema and bruising of the oral and pharyngeal cavity causing difficulty swallowing.  Patient is unable to masticate due to pain in oral cavity    RN cleared patient for participation in assessment     yes     PRIOR LEVEL OF SWALLOW FUNCTION:    PAST HISTORY OF DYSPHAGIA?: none reported    Home diet: Regular consistency solids (IDDSI level 7) with  thin liquids (IDDSI level 0)    PROCEDURE:  Consistencies Administered During the Evaluation   Liquids: thin liquid, nectar thick liquid and honey thick liquid   Solids:  pureed foods      Method of Intake:   cup, straw, spoon  Self fed      Position:   Seated, upright    CLINICAL ASSESSMENT:  Oral Stage:       Decreased mastication due to: pain in oral cavity  Decreased bolus formation resulting in suspected premature pharyngeal spillage      Pharyngeal Stage:    Immediate wet cough was noted after presentation of thin liquid and nectar consistency liquid  Patient also has a baseline wet cough     Cognition:   Within functional limits for this exam    Oral Peripheral Examination   Generalized oral weakness    Current Respiratory Status    room air     Parameters of Speech Production  Respiration:  Adequate for speech production  Quality:   Within functional limits  Intensity: Quiet    Volitional Swallow: present     Volitional Cough:   present     Pain: 7/10 - Pain location: oral and pharyngeal cavity    EDUCATION:   The Speech Language Pathologist (SLP) completed education regarding results of evaluation and that intervention is warranted at this time. Learner: Patient and Family  Education: Reviewed results and recommendations of this evaluation and Reviewed diet and strategies  Evaluation of Education:  Carter Alexis understanding    This plan may be re-evaluated and revised as warranted. Evaluation Time includes thorough review of current medical information, gathering information on past medical history/social history and prior level of function, completion of standardized testing/informal observation of tasks, assessment of data and education on plan of care and goals. [x]The admitting diagnosis and active problem list, have been reviewed prior to initiation of this evaluation.         ACTIVE PROBLEM LIST:   Patient Active Problem List   Diagnosis    Anemia requiring transfusions    Hemorrhagic complications of dental implant placement    BPH (benign prostatic hyperplasia)    [facility]         CPT code:  58495  bedside swallow eval Irby Mins D'Amico M. A. Dominica Bing [id number]  Speech Language Pathologist

## 2021-10-27 NOTE — PROGRESS NOTES
[facility] Hospitalist   Progress Note    Admitting Date and Time: 10/25/2021  8:59 PM  Admit Dx: Anemia requiring transfusions [D64.9]  Altered mental status, unspecified altered mental status type [R41.82]     Seen for follow on multiple problems as listed below    Subjective:  Facial bruise , ecchymosis, is improving as per wife at bedside. Has been evaluated by SLP - is on soft & bite sized diet. As per wife very weak and is unsteady- Pt/OT is consulted. Denies CP ,sob , n/v//d/abd pain. Uneventful night otherwise. ROS: denies fever, chills, cp, sob, n/v, HA unless stated above.  sodium chloride flush  5-40 mL IntraVENous 2 times per day    lisinopril  20 mg Oral Daily    And    hydroCHLOROthiazide  12.5 mg Oral Daily    amoxicillin-clavulanate  1 tablet Oral 3 times per day    pantoprazole  40 mg IntraVENous Daily     sodium chloride flush, 5-40 mL, PRN  sodium chloride, 25 mL, PRN  ondansetron, 4 mg, Q8H PRN   Or  ondansetron, 4 mg, Q6H PRN  acetaminophen, 650 mg, Q6H PRN   Or  acetaminophen, 650 mg, Q6H PRN  HYDROcodone-acetaminophen, 1 tablet, Q6H PRN  sodium chloride, , PRN         Objective:    BP (!) 110/56   Pulse 83   Temp 98.1 °F (36.7 °C) (Oral)   Resp 18   Ht 5' 8\" (1.727 m)   Wt 151 lb (68.5 kg)   SpO2 98%   BMI 22.96 kg/m²   General Appearance: alert and oriented to person, place and time, well developed and well- nourished, in no acute distress  Skin: warm and dry, s/p dental implant surg with large bruise/ecchymosis extending rt face, mandible and neck. Head: normocephalic and atraumatic  Eyes: pupils equal, round, and reactive to light, extraocular eye movements intact, conjunctivae normal  Neck: supple and non-tender without mass  Pulmonary/Chest: clear to auscultation bilaterally-with few bibasilar fine rales.   Cardiovascular: normal rate, regular rhythm, normal S1 and S2  Abdomen: soft, non-tender, non-distended, normal bowel sounds, no masses or organomegaly  Extremities: no cyanosis, clubbing   Neurologic:  no cranial nerve deficit, speech normal      Recent Labs     10/25/21  2149 10/27/21  0206   * 136   K 4.8 4.2    106   CO2 19* 18*   BUN 58* 42*   CREATININE 2.0* 1.2   GLUCOSE 83 86   CALCIUM 8.4* 8.2*       Recent Labs     10/25/21  2149 10/26/21  0921 10/27/21  0206   WBC 10.0  --  8.0   RBC 1.93*  --  2.36*   HGB 6.1* 8.1* 7.4*  7.6*   HCT 18.7* 23.6* 22.3*  22.5*   MCV 96.9  --  95.3   MCH 31.6  --  32.2   MCHC 32.6  --  33.8   RDW 13.4  --  13.6     --  195   MPV 10.6  --  10.9       Labs and images reviewed     Radiology:   CT ABDOMEN PELVIS WO CONTRAST Additional Contrast? None   Final Result   Nonobstructive stones in the right kidney measuring less than 3 mm. No   obstructive uropathy. Bilateral perinephric stranding, nonspecific  and   which may be chronic. Small partially loculated left pleural effusion. Chronic interstitial and   pleuroparenchymal scarring in the lung bases. Sigmoid diverticulosis with no evidence of diverticulitis. CT head without contrast   Final Result   No acute intracranial abnormality. XR CHEST PORTABLE   Final Result   Atelectasis versus infiltrate at left lung base. Assessment:    Principal Problem:    Hemorrhagic complications of dental implant placement  Active Problems:    Anemia requiring transfusions    BPH (benign prostatic hyperplasia)    Aspiration pneumonia (HCC)  Resolved Problems:    * No resolved hospital problems. *      Plan:  Acute blood loss symptomatic anemia required 2 pack RBC. Likely secondary to large amount of bleeding following dental implant,  swallowing large amount of swallowed blood from recent dental procedure, FOBT positive secondary to this. General surgery consulted and following. As per general surgery okay to add diet. No plans of scope. Continue PPI and Carafate.   Monitor H&H and transfuse as needed.     Rt facial ecchymosis/hematoma extending around mouth and down to neck-monitor. As per surgery okay to add diet. He is not on any blood thinners. Dysphagia sec to above- SLP following, On pureed solids and honey thick liq      Fever/hypoxia on admission/CT with small partially loculated left pleural effusion, chronic interstitial and pleuroparenchymal scarring in the lung base. Concern about aspiration/pneumonia. Started on IV Unasyn. Pulmonary consulted with loculated pleural effusion. As per pulm would hold off on any instrumentation ,CT s/o modest interstitial lung disease. Will need follow with pulm.       Urinary retention-continue to monitor. Check PVR. Follow.     Hypertension-resume as per home  Lyme disease-resume as per home  On SCDs for DVT prophylaxis  Full code. As above unsteady on feet - PT/OT consulted.     Electronically signed by Pita Shaikh MD on 10/27/2021 at 10:02 AM

## 2021-10-27 NOTE — PROGRESS NOTES
SPEECH LANGUAGE PATHOLOGY  DAILY PROGRESS NOTE        PATIENT NAME:  Felicia Thompson      :  1950          TODAY'S DATE:  10/27/2021 ROOM:  [address] [place] [address]    Patient seen for dysphagia therapy following completion of bedside swallow evaluation. Wife present at bedside. Wife reported patient's baseline cough is due to his blood pressure medication. No prior history of swallowing difficulties. Educated patient and wife on diet and liquid recommendation due to the pain and edema in oral and pharyngeal cavity. Also educated patient and wife on compensatory strategies including small bites/sips, alternate solids/liquids, seated upright during PO intake, and no straw. Patient and wife demonstrated good understanding. Will monitor HTL with meals. CPT code(s) 43652  dysphagia tx  Total minutes :  15 minutes    Natalie M D'Amico M. A.  [address] [facility] [place] [id number]  Speech Language Pathologist

## 2021-10-27 NOTE — CARE COORDINATION
Met w/ patient. Explained role of  and plan of care. Lives w/ his wife in a 2 story house- 2 steps to entrance. Independent PTA. No Hx DMEs, HHC, or VAISHALI. PCP is Solange Powers NP and pharmacy is [facility]. PT/OT evals pending. On po abx. Per pt, plan is to return home on discharge pending progress.  Will follow Landry Frances RN case manager

## 2021-10-27 NOTE — PROGRESS NOTES
Pulmonary Progress Note    Admit Date: 10/25/2021  Hospital day                               PCP: Darrel Cedillo MD    Chief Complaint (s):  Patient Active Problem List   Diagnosis    Anemia requiring transfusions    Hemorrhagic complications of dental implant placement    BPH (benign prostatic hyperplasia)    Aspiration pneumonia (HCC)       Subjective:  · Awake, alert and anxious. Wife reports easy bruising with just trivial touching of the lower extremities. Vitals:  VITALS:  BP (!) 110/56   Pulse 83   Temp 98.1 °F (36.7 °C) (Oral)   Resp 18   Ht 5' 8\" (1.727 m)   Wt 151 lb (68.5 kg)   SpO2 98%   BMI 22.96 kg/m²     24HR INTAKE/OUTPUT:      Intake/Output Summary (Last 24 hours) at 10/27/2021 1356  Last data filed at 10/27/2021 0550  Gross per 24 hour   Intake 6 ml   Output 1000 ml   Net -994 ml       24HR PULSE OXIMETRY RANGE:    SpO2  Av.3 %  Min: 94 %  Max: 98 %    Medications:  IV:   sodium chloride      sodium chloride         Scheduled Meds:   sodium chloride flush  5-40 mL IntraVENous 2 times per day    lisinopril  20 mg Oral Daily    And    hydroCHLOROthiazide  12.5 mg Oral Daily    amoxicillin-clavulanate  1 tablet Oral 3 times per day    pantoprazole  40 mg IntraVENous Daily       Diet:   ADULT DIET; Dysphagia - Soft and Bite Sized     EXAM:  General: No distress. Alert. Speech is slurred. Eyes: PERRL. No sclera icterus. No conjunctival injection. ENT: No discharge. Pharynx clear. Neck: Trachea midline. Normal thyroid. Ecchymosis diffusely  Resp: No accessory muscle use. No rales. No wheezing. No rhonchi. CV: Regular rate. Regular rhythm. No murmur or rub. Abd: Non-tender. Non-distended. No masses. No organomegaly. Normal bowel sounds. Skin: Warm and dry. No nodule on exposed extremities. No rash on exposed extremities. Diffuse bruising. Ext: No cyanosis, clubbing, edema  Lymph: No cervical LAD. No supraclavicular LAD. M/S: No cyanosis.  No joint deformity. No clubbing. Neuro: Awake. Follows commands. Positive pupils/gag/corneals. Normal pain response. Results:  CBC:   Recent Labs     10/25/21  2149 10/26/21  0921 10/27/21  0206   WBC 10.0  --  8.0   HGB 6.1* 8.1* 7.4*  7.6*   HCT 18.7* 23.6* 22.3*  22.5*   MCV 96.9  --  95.3     --  195     BMP:   Recent Labs     10/25/21  2149 10/27/21  0206   * 136   K 4.8 4.2    106   CO2 19* 18*   BUN 58* 42*   CREATININE 2.0* 1.2     LIVER PROFILE:   Recent Labs     10/25/21  2149 10/27/21  0206   AST 42* 109*   ALT 11 36   BILITOT 0.5 0.8   ALKPHOS 76 68     PT/INR:   Recent Labs     10/26/21  0921   PROTIME 12.0   INR 1.1     APTT: No results for input(s): APTT in the last 72 hours. Pathology:  1. N/A      Microbiology:  1. None    Recent ABG:   No results for input(s): PH, PO2, PCO2, HCO3, BE, O2SAT, METHB, O2HB, COHB, O2CON, HHB, THB in the last 72 hours. Recent Films:  CT ABDOMEN PELVIS WO CONTRAST Additional Contrast? None   Final Result   Nonobstructive stones in the right kidney measuring less than 3 mm. No   obstructive uropathy. Bilateral perinephric stranding, nonspecific  and   which may be chronic. Small partially loculated left pleural effusion. Chronic interstitial and   pleuroparenchymal scarring in the lung bases. Sigmoid diverticulosis with no evidence of diverticulitis. CT head without contrast   Final Result   No acute intracranial abnormality. XR CHEST PORTABLE   Final Result   Atelectasis versus infiltrate at left lung base.                      Assessment:  1. Hypoxia: Changes noted at both lung bases suggest a modest amount of interstitial lung disease. 2. The left effusion appears as pleural thickening and scarring more so than fluid. 3. Diffuse bruising. While there is clear blood loss anemia, an underlying coagulopathy is still suspected.   No medications are implicated in platelet count as well as PT are normal.        Plan:  1. Would hold off on any instrumentation of the left effusion  2. Transfuse as needed  3. Check PTT      Time at the bedside, reviewing labs and radiographs, reviewing updated notes and consultations, discussing with staff and family was more than 35 minutes. Please note that voice recognition technology was used in the preparation of this note and make therefore it may contain inadvertent transcription errors. If the patient is a COVID 19 isolation patient, the above physical exam reflects that of the examining physician for the day. Jaja Hicks MD,  M.D., F.C.C.P.     [facility], [address] [place] [address], [address] [place] [address]

## 2021-10-27 NOTE — ED NOTES
RN faxed SBAR to floor. Confirmation received and spoke with [facility]. Pt ready for transport to room when room is ready.        Dayana Mcpherson RN  10/26/21 2040       Dayana Mcpherson RN  10/26/21 2040

## 2021-10-28 ENCOUNTER — APPOINTMENT (OUTPATIENT)
Dept: CT IMAGING | Age: 71
DRG: 813 | End: 2021-10-28
Payer: MEDICARE

## 2021-10-28 LAB
ALBUMIN SERPL-MCNC: 2.9 G/DL (ref 3.5–5.2)
ALP BLD-CCNC: 71 U/L (ref 40–129)
ALT SERPL-CCNC: 40 U/L (ref 0–40)
AMMONIA: <10 UMOL/L (ref 16–60)
ANION GAP SERPL CALCULATED.3IONS-SCNC: 15 MMOL/L (ref 7–16)
AST SERPL-CCNC: 92 U/L (ref 0–39)
BASOPHILS ABSOLUTE: 0.02 E9/L (ref 0–0.2)
BASOPHILS ABSOLUTE: 0.02 E9/L (ref 0–0.2)
BASOPHILS RELATIVE PERCENT: 0.3 % (ref 0–2)
BASOPHILS RELATIVE PERCENT: 0.3 % (ref 0–2)
BILIRUB SERPL-MCNC: 0.7 MG/DL (ref 0–1.2)
BUN BLDV-MCNC: 45 MG/DL (ref 6–23)
CALCIUM SERPL-MCNC: 8.5 MG/DL (ref 8.6–10.2)
CHLORIDE BLD-SCNC: 106 MMOL/L (ref 98–107)
CO2: 18 MMOL/L (ref 22–29)
CREAT SERPL-MCNC: 1.2 MG/DL (ref 0.7–1.2)
D DIMER: 1001 NG/ML DDU
EOSINOPHILS ABSOLUTE: 0.05 E9/L (ref 0.05–0.5)
EOSINOPHILS ABSOLUTE: 0.07 E9/L (ref 0.05–0.5)
EOSINOPHILS RELATIVE PERCENT: 0.8 % (ref 0–6)
EOSINOPHILS RELATIVE PERCENT: 1.1 % (ref 0–6)
FIBRINOGEN: >700 MG/DL (ref 225–540)
FOLATE: >20 NG/ML (ref 4.8–24.2)
GFR AFRICAN AMERICAN: >60
GFR NON-AFRICAN AMERICAN: 60 ML/MIN/1.73
GLUCOSE BLD-MCNC: 85 MG/DL (ref 74–99)
HAPTOGLOBIN: 279 MG/DL (ref 30–200)
HCT VFR BLD CALC: 21.4 % (ref 37–54)
HCT VFR BLD CALC: 22.7 % (ref 37–54)
HCT VFR BLD CALC: 23.9 % (ref 37–54)
HEMOGLOBIN: 7.1 G/DL (ref 12.5–16.5)
HEMOGLOBIN: 7.4 G/DL (ref 12.5–16.5)
HEMOGLOBIN: 7.9 G/DL (ref 12.5–16.5)
IMMATURE GRANULOCYTES #: 0.09 E9/L
IMMATURE GRANULOCYTES #: 0.09 E9/L
IMMATURE GRANULOCYTES %: 1.4 % (ref 0–5)
IMMATURE GRANULOCYTES %: 1.5 % (ref 0–5)
IMMATURE RETIC FRACT: 11.7 % (ref 2.3–13.4)
LACTATE DEHYDROGENASE: 269 U/L (ref 135–225)
LYMPHOCYTES ABSOLUTE: 1.04 E9/L (ref 1.5–4)
LYMPHOCYTES ABSOLUTE: 1.05 E9/L (ref 1.5–4)
LYMPHOCYTES RELATIVE PERCENT: 16.7 % (ref 20–42)
LYMPHOCYTES RELATIVE PERCENT: 17.5 % (ref 20–42)
MCH RBC QN AUTO: 31.9 PG (ref 26–35)
MCH RBC QN AUTO: 32.2 PG (ref 26–35)
MCHC RBC AUTO-ENTMCNC: 32.6 % (ref 32–34.5)
MCHC RBC AUTO-ENTMCNC: 33.1 % (ref 32–34.5)
MCV RBC AUTO: 97.6 FL (ref 80–99.9)
MCV RBC AUTO: 97.8 FL (ref 80–99.9)
MONOCYTES ABSOLUTE: 0.39 E9/L (ref 0.1–0.95)
MONOCYTES ABSOLUTE: 0.46 E9/L (ref 0.1–0.95)
MONOCYTES RELATIVE PERCENT: 6.3 % (ref 2–12)
MONOCYTES RELATIVE PERCENT: 7.7 % (ref 2–12)
NEUTROPHILS ABSOLUTE: 4.33 E9/L (ref 1.8–7.3)
NEUTROPHILS ABSOLUTE: 4.62 E9/L (ref 1.8–7.3)
NEUTROPHILS RELATIVE PERCENT: 72.2 % (ref 43–80)
NEUTROPHILS RELATIVE PERCENT: 74.2 % (ref 43–80)
PDW BLD-RTO: 13.5 FL (ref 11.5–15)
PDW BLD-RTO: 13.8 FL (ref 11.5–15)
PLATELET # BLD: 203 E9/L (ref 130–450)
PLATELET # BLD: 239 E9/L (ref 130–450)
PMV BLD AUTO: 10.9 FL (ref 7–12)
PMV BLD AUTO: 11.3 FL (ref 7–12)
POTASSIUM SERPL-SCNC: 3.9 MMOL/L (ref 3.5–5)
RBC # BLD: 2.32 E12/L (ref 3.8–5.8)
RBC # BLD: 2.45 E12/L (ref 3.8–5.8)
RETIC HGB EQUIVALENT: 29 PG (ref 28.2–36.6)
RETICULOCYTE ABSOLUTE COUNT: 0.09 E12/L
RETICULOCYTE COUNT PCT: 3.8 % (ref 0.4–1.9)
SODIUM BLD-SCNC: 139 MMOL/L (ref 132–146)
TOTAL PROTEIN: 5.5 G/DL (ref 6.4–8.3)
URINE CULTURE, ROUTINE: NORMAL
VITAMIN B-12: 1452 PG/ML (ref 211–946)
WBC # BLD: 6 E9/L (ref 4.5–11.5)
WBC # BLD: 6.2 E9/L (ref 4.5–11.5)

## 2021-10-28 PROCEDURE — 85384 FIBRINOGEN ACTIVITY: CPT

## 2021-10-28 PROCEDURE — 82140 ASSAY OF AMMONIA: CPT

## 2021-10-28 PROCEDURE — 0202U NFCT DS 22 TRGT SARS-COV-2: CPT

## 2021-10-28 PROCEDURE — 2060000000 HC ICU INTERMEDIATE R&B

## 2021-10-28 PROCEDURE — 83615 LACTATE (LD) (LDH) ENZYME: CPT

## 2021-10-28 PROCEDURE — 70450 CT HEAD/BRAIN W/O DYE: CPT

## 2021-10-28 PROCEDURE — 82607 VITAMIN B-12: CPT

## 2021-10-28 PROCEDURE — 6370000000 HC RX 637 (ALT 250 FOR IP): Performed by: INTERNAL MEDICINE

## 2021-10-28 PROCEDURE — 82746 ASSAY OF FOLIC ACID SERUM: CPT

## 2021-10-28 PROCEDURE — 85025 COMPLETE CBC W/AUTO DIFF WBC: CPT

## 2021-10-28 PROCEDURE — 6360000002 HC RX W HCPCS: Performed by: INTERNAL MEDICINE

## 2021-10-28 PROCEDURE — 85018 HEMOGLOBIN: CPT

## 2021-10-28 PROCEDURE — 92526 ORAL FUNCTION THERAPY: CPT | Performed by: SPEECH-LANGUAGE PATHOLOGIST

## 2021-10-28 PROCEDURE — 84597 ASSAY OF VITAMIN K: CPT

## 2021-10-28 PROCEDURE — 85045 AUTOMATED RETICULOCYTE COUNT: CPT

## 2021-10-28 PROCEDURE — 80053 COMPREHEN METABOLIC PANEL: CPT

## 2021-10-28 PROCEDURE — 99233 SBSQ HOSP IP/OBS HIGH 50: CPT | Performed by: FAMILY MEDICINE

## 2021-10-28 PROCEDURE — 36415 COLL VENOUS BLD VENIPUNCTURE: CPT

## 2021-10-28 PROCEDURE — 83010 ASSAY OF HAPTOGLOBIN QUANT: CPT

## 2021-10-28 PROCEDURE — 2580000003 HC RX 258: Performed by: INTERNAL MEDICINE

## 2021-10-28 PROCEDURE — 85378 FIBRIN DEGRADE SEMIQUANT: CPT

## 2021-10-28 PROCEDURE — C9113 INJ PANTOPRAZOLE SODIUM, VIA: HCPCS | Performed by: INTERNAL MEDICINE

## 2021-10-28 PROCEDURE — 85014 HEMATOCRIT: CPT

## 2021-10-28 RX ADMIN — ACETAMINOPHEN 650 MG: 325 TABLET ORAL at 20:41

## 2021-10-28 RX ADMIN — AMOXICILLIN AND CLAVULANATE POTASSIUM 1 TABLET: 500; 125 TABLET, FILM COATED ORAL at 14:00

## 2021-10-28 RX ADMIN — Medication 10 ML: at 20:37

## 2021-10-28 RX ADMIN — HYDROCHLOROTHIAZIDE 12.5 MG: 12.5 TABLET ORAL at 10:03

## 2021-10-28 RX ADMIN — PANTOPRAZOLE SODIUM 40 MG: 40 INJECTION, POWDER, FOR SOLUTION INTRAVENOUS at 10:04

## 2021-10-28 RX ADMIN — LISINOPRIL 20 MG: 20 TABLET ORAL at 10:03

## 2021-10-28 RX ADMIN — Medication 10 ML: at 10:04

## 2021-10-28 RX ADMIN — AMOXICILLIN AND CLAVULANATE POTASSIUM 1 TABLET: 500; 125 TABLET, FILM COATED ORAL at 21:30

## 2021-10-28 RX ADMIN — AMOXICILLIN AND CLAVULANATE POTASSIUM 1 TABLET: 500; 125 TABLET, FILM COATED ORAL at 06:38

## 2021-10-28 ASSESSMENT — PAIN SCALES - GENERAL
PAINLEVEL_OUTOF10: 0
PAINLEVEL_OUTOF10: 0

## 2021-10-28 NOTE — CONSULTS
Department of Medicine  Division of Hematology/Oncology  Attending Consult Note      Reason for Consult:  Elevated PTT and significant ecchymosis  Requesting Physician:  Dr. Jackie Pinto:  Anemia    History Obtained From:  Patient, daughter and EMR    HISTORY OF PRESENT ILLNESS:      The patient is a 80 y.o. male with significant past medical history of Hypertension and prior history of Lyme disease that was treated 2 years ago with chronic prednisone 10 mg and 5 mg in evening. He underwent dental implant surgery approximately a week ago. He developed extensive ecchymosis around his mouth and down his neck. He is not on any anticoagulation. He continued to bleed profusely and swallowed some of the blood. He had dark stool. He developed significant fatigue and lethargy. Also reports of fever. X-ray revealed left-sided infiltrate likely aspiration. ER evaluation revealed heme positive stool. He was also hypotensive and his hemoglobin decreased to 6.1 g/dL. Found to have an elevated PTT prompting hematology evaluation. Has blood loss and bruising as above. Has chorea like movement for the last week as per daughter. He reports of easy bruising while at work at [employer]. No childhood hemorrhage history. Denies any headaches, blurry vision, dizziness, lightheadedness. Denies any chest pain, palpitations, shortness of breath, dyspnea with exertion. Denies any abdominal pain, nausea, vomiting, diarrhea or constipation. Denies any lymph node swellings, lumps, bumps, fevers, chills, night sweats or unintentional weight loss.      Past Medical History:        Diagnosis Date    Hypertension     Lyme disease 2018       Past Surgical History:        Procedure Laterality Date    OTHER SURGICAL HISTORY      dental implant       Current Medications:    Current Facility-Administered Medications: sodium chloride flush 0.9 % injection 5-40 mL, 5-40 mL, IntraVENous, 2 times per day  sodium chloride flush 0.9 % injection 5-40 mL, 5-40 mL, IntraVENous, PRN  0.9 % sodium chloride infusion, 25 mL, IntraVENous, PRN  ondansetron (ZOFRAN-ODT) disintegrating tablet 4 mg, 4 mg, Oral, Q8H PRN **OR** ondansetron (ZOFRAN) injection 4 mg, 4 mg, IntraVENous, Q6H PRN  acetaminophen (TYLENOL) tablet 650 mg, 650 mg, Oral, Q6H PRN **OR** acetaminophen (TYLENOL) suppository 650 mg, 650 mg, Rectal, Q6H PRN  HYDROcodone-acetaminophen (NORCO)  MG per tablet 1 tablet, 1 tablet, Oral, Q6H PRN  lisinopril (PRINIVIL;ZESTRIL) tablet 20 mg, 20 mg, Oral, Daily **AND** hydroCHLOROthiazide (HYDRODIURIL) tablet 12.5 mg, 12.5 mg, Oral, Daily  amoxicillin-clavulanate (AUGMENTIN) 500-125 MG per tablet 1 tablet, 1 tablet, Oral, 3 times per day  pantoprazole (PROTONIX) injection 40 mg, 40 mg, IntraVENous, Daily  0.9 % sodium chloride infusion, , IntraVENous, PRN    Allergies:  Doxycycline    Social History:   Social History     Socioeconomic History    Marital status:      Spouse name: Not on file    Number of children: Not on file    Years of education: Not on file    Highest education level: Not on file   Occupational History    Not on file   Tobacco Use    Smoking status: Former Smoker     Packs/day: 0.50     Types: Cigarettes     Quit date:      Years since quittin.8    Smokeless tobacco: Never Used   Substance and Sexual Activity    Alcohol use: Yes     Comment: occasionally    Drug use: No    Sexual activity: Not on file   Other Topics Concern    Not on file   Social History Narrative    Not on file     Social Determinants of Health     Financial Resource Strain:     Difficulty of Paying Living Expenses:    Food Insecurity:     Worried About Running Out of Food in the Last Year:     Ran Out of Food in the Last Year:    Transportation Needs:     Lack of Transportation (Medical):      Lack of Transportation (Non-Medical):    Physical Activity:     Days of Exercise per Week:     Minutes of Exercise per Session:    Stress:     Feeling of Stress :    Social Connections:     Frequency of Communication with Friends and Family:     Frequency of Social Gatherings with Friends and Family:     Attends Judaism Services:     Active Member of Clubs or Organizations:     Attends Club or Organization Meetings:     Marital Status:    Intimate Partner Violence:     Fear of Current or Ex-Partner:     Emotionally Abused:     Physically Abused:     Sexually Abused:        Family History:     History reviewed. No pertinent family history. REVIEW OF SYSTEMS:    As per HPI otherwise 14 pt ros negative. PHYSICAL EXAM:      Vitals:  BP (!) 99/59   Pulse 86   Temp 98.1 °F (36.7 °C) (Oral)   Resp 17   Ht 5' 8\" (1.727 m)   Wt 170 lb 3.2 oz (77.2 kg)   SpO2 97%   BMI 25.88 kg/m²     CONSTITUTIONAL:  awake, alert, cooperative, no apparent distress, and appears stated age  HEENT:  +Ecchymoses in R periorbital, R lower face and down neck. Normocepalic, atraumatic, no obvious abnormality. Lids and lashes normal, EOMI, sclera clear, conjunctiva normal  NECK:  +Ecchymoses on the Right side. Supple, full ROM, symmetrical, trachea midline, no adenopathy, thyroid symmetric, not enlarged and no tenderness, skin normal  HEMATOLOGIC/LYMPHATICS:  no cervical or supraclavicular lymphadenopathy  LUNGS:  No increased work of breathing, good air exchange, clear to auscultation bilaterally, no crackles or wheezing  CARDIOVASCULAR:  Normal apical impulse, regular rate and rhythm, normal S1 and S2, no S3 or S4, and no murmur noted  ABDOMEN:  No scars, normal BS, soft, non-distended, non-tender, no masses palpated, no hepatosplenomegaly  MUSCULOSKELETAL:  No redness, warmth, or swelling of the joints; tone is normal  NEUROLOGIC:  Awake, alert, oriented to name, place and time. Cranial nerves II-XII are grossly intact.        DATA:      CMP:    Lab Results   Component Value Date     10/28/2021    K 3.9 10/28/2021 K 4.2 10/27/2021     10/28/2021    CO2 18 10/28/2021    BUN 45 10/28/2021    PROT 5.5 10/28/2021       CBC:    Recent Labs     10/25/21  2149 10/26/21  0921 10/27/21  0206 10/27/21  0206 10/27/21  1440 10/28/21  0040 10/28/21  0641   WBC 10.0  --  8.0  --   --   --  6.0   HGB 6.1*   < > 7.4*  7.6*   < > 7.5* 7.1* 7.4*     --  195  --   --   --  203    < > = values in this interval not displayed. Radiology:    CT ABDOMEN PELVIS WO CONTRAST Additional Contrast? None  Result Date: 10/26/2021  EXAMINATION: CT OF THE ABDOMEN AND PELVIS WITHOUT CONTRAST 10/25/2021 10:45 pm TECHNIQUE: CT of the abdomen and pelvis was performed without the administration of intravenous contrast. Multiplanar reformatted images are provided for review. Dose modulation, iterative reconstruction, and/or weight based adjustment of the mA/kV was utilized to reduce the radiation dose to as low as reasonably achievable. COMPARISON: None. HISTORY: ORDERING SYSTEM PROVIDED HISTORY: GI bleed TECHNOLOGIST PROVIDED HISTORY: Reason for exam:->GI bleed Additional Contrast?->None FINDINGS: Lower Chest: Calcified granuloma in the right lower lobe. Chronic interstitial changes and pleuroparenchymal scarring in the lung bases. Small partially loculated left pleural effusion. Organs: Liver and spleen are within normal limits. Adrenals and pancreas within normal limits. Gallbladder within normal limits. Nonobstructive stones in the right kidney measuring less than 3 mm. No obstructive uropathy. Bilateral perinephric stranding, nonspecific and which may be chronic. GI/Bowel: No evidence of bowel obstruction or free intraperitoneal air. No colitis or diverticulitis. Normal appendix. Sigmoid diverticulosis. Pelvis: Urinary bladder within normal limits. Peritoneum/Retroperitoneum: No abdominal aortic aneurysm or retroperitoneal adenopathy. Bones/Soft Tissues: No evidence of acute bony pathology.    Nonobstructive stones in the right kidney measuring less than 3 mm. No obstructive uropathy. Bilateral perinephric stranding, nonspecific  and which may be chronic. Small partially loculated left pleural effusion. Chronic interstitial and pleuroparenchymal scarring in the lung bases. Sigmoid diverticulosis with no evidence of diverticulitis. CT HEAD WO CONTRAST  Result Date: 10/28/2021  EXAMINATION: CT OF THE HEAD WITHOUT CONTRAST  10/28/2021 10:09 am TECHNIQUE: CT of the head was performed without the administration of intravenous contrast. Dose modulation, iterative reconstruction, and/or weight based adjustment of the mA/kV was utilized to reduce the radiation dose to as low as reasonably achievable. COMPARISON: CT head 10/25/2021 HISTORY: ORDERING SYSTEM PROVIDED HISTORY: chorea, r/o hemorrhage TECHNOLOGIST PROVIDED HISTORY: Reason for exam:->chorea, r/o hemorrhage Has a \"code stroke\" or \"stroke alert\" been called? ->No FINDINGS: BRAIN/VENTRICLES: There is no acute intracranial hemorrhage, mass effect or midline shift. No abnormal extra-axial fluid collection. The gray-white differentiation is maintained without evidence of an acute infarct. There is no evidence of hydrocephalus. ORBITS: The visualized portion of the orbits demonstrate no acute abnormality. SINUSES: Mild sinus mucosal thickening is present in the floor of the left maxillary sinus. The visualized portions of the mastoid air cells are clear. SOFT TISSUES/SKULL:  No acute abnormality of the visualized skull or soft tissues. No acute intracranial process is identified. CT head without contrast  Result Date: 10/25/2021  EXAMINATION: CT OF THE HEAD WITHOUT CONTRAST  10/25/2021 9:17 pm TECHNIQUE: CT of the head was performed without the administration of intravenous contrast. Dose modulation, iterative reconstruction, and/or weight based adjustment of the mA/kV was utilized to reduce the radiation dose to as low as reasonably achievable. COMPARISON: None.  HISTORY: ORDERING SYSTEM PROVIDED HISTORY: AMS TECHNOLOGIST PROVIDED HISTORY: Reason for exam:->AMS Has a \"code stroke\" or \"stroke alert\" been called? ->No Decision Support Exception - unselect if not a suspected or confirmed emergency medical condition->Emergency Medical Condition (MA) FINDINGS: BRAIN/VENTRICLES: There is no acute intracranial hemorrhage, mass effect or midline shift. No abnormal extra-axial fluid collection. The gray-white differentiation is maintained without evidence of an acute infarct. There is prominence of the ventricles and sulci due to global parenchymal volume loss. There are nonspecific areas of hypoattenuation within the periventricular and subcortical white matter, which likely represent chronic microvascular ischemic change. ORBITS: The visualized portion of the orbits demonstrate no acute abnormality. SINUSES: The visualized paranasal sinuses and mastoid air cells demonstrate no acute abnormality. SOFT TISSUES/SKULL: No acute abnormality of the visualized skull or soft tissues. No acute intracranial abnormality. XR CHEST PORTABLE  Result Date: 10/25/2021  EXAMINATION: ONE XRAY VIEW OF THE CHEST 10/25/2021 9:22 pm COMPARISON: 01/18/2019 HISTORY: ORDERING SYSTEM PROVIDED HISTORY: AMS TECHNOLOGIST PROVIDED HISTORY: Reason for exam:->AMS FINDINGS: There is no cardiomegaly or vascular congestion. There is some atelectasis versus infiltrate at the left lung base. There is no pleural effusion or pneumothorax seen. Atelectasis versus infiltrate at left lung base. IMPRESSION:   Elevated PTT  Anemia secondary to acute blood loss  Small L pleural scarring   Aspiration PNA  Hx of Lyme disease on chronic prednisone       RECOMMENDATIONS:  Interesting presentation with significantly elevated PTT and bleeding in setting of history of Lyme disease. I am concerned about acquired hemophilia. Unremarkable CT abd, no liver pathology noted.   Will proceed with hematology evaluation but will also need to investigate for neurological causes of his movements. Check PTT mixing study, factor assay, antiphospholipid antibody  Check DIC screen. Anemia from bleeding, will also rule out hemolysis. Check peripheral blood smear. Check lyme titer. Neuro evaluation    Addendum: Recommend transfer to tertiary center for acquired hemophilia and has high risk of further bleeding. I have discussed with covering attending who will initiate transfer. Thank you for the consultation.         Electronically signed by Jennifer Batista MD on 10/28/2021 at 6:11 PM

## 2021-10-28 NOTE — CARE COORDINATION
Social Work discharge planning   Sw met with pt and wife. Discussed PT [facility] 15. They are agreeable to SNF. They live in [address] [place] [address]. Sw gave them snf choice lists for [employer] and [facility]. Sw asked them for 3 choices by this afternoon. Await choices to start referrals. [facility] started in [address] [place] [address]. Electronically signed by Deven Bazan on 10/28/2021 at 11:48 AM     Addendum-    Glenn met with pt and wife. Explained general Medicare snf coverage.  They want to talk to their adult children tonight and plan to [facility] 3 snf chocies by tomorrow am.  Electronically signed by Deven Bazan on 10/28/2021 at 2:56 PM

## 2021-10-28 NOTE — PROGRESS NOTES
SPEECH LANGUAGE PATHOLOGY  DAILY PROGRESS NOTE        PATIENT NAME:  Henok Fabian      :  1950          TODAY'S DATE:  10/28/2021 ROOM:  [address] [place], [place] [address]F        SWALLOWING    Pt in bed, wife present. Pt had recently returned from CT head. Noted per chart that diet had not been modified per ST recommendations from yesterday. RN notified and diet was changed. SLP educated pt and wife that diet change would occur for dinner tonight. All questions answered. Pt reported toleration of current soft and bite sized diet however reports he was chewing on side of implants and was possibly told not to. SLP to continue to assess. Trial of thin liquid completed with inconsistent wet respirations noted. Pt reports this is not new. SLP to continue to assess and modify diet as warranted. DYSPHAGIA DIAGNOSIS:   Clinical indicators of moderate oropharyngeal phase dysphagia                             DIET RECOMMENDATIONS:  Pureed consistency solids (IDDSI level 4) with  honey consistency (moderately thick - IDDSI level 3)  Liquids     If patient is coughing on medications with HTL, recommend pills to be crushed in applesauce                  FEEDING RECOMMENDATIONS:                           Assistance level:  Stand by assistance is needed during all oral intake                             Compensatory strategies recommended: Small bites/sips, Alternate solids and liquids and No straw        Oral- adequate labial seal and A-P transfer, no oral residue      Pharyngeal- wet respirations with liquids, no multiple swallows      Education- Pt educated on results and POC. Pt trained on compensatory strategies for safe swallow with good outcome. Questions answered. Will continue SP intervention as per previously established POC. Laura YANEZ CCC/SLP [id number]  Speech Pathologist              CPT code(s) 38813  dysphagia tx  Total minutes :  15 minutes

## 2021-10-28 NOTE — PROGRESS NOTES
[facility] Quality Flow/Interdisciplinary Rounds Progress Note        Quality Flow Rounds held on October 28, 2021    Disciplines Attending:  Bedside Nurse, ,  and Nursing Unit Leadership    Herman Guan was admitted on 10/25/2021  8:59 PM    Anticipated Discharge Date:  Expected Discharge Date: 10/31/21    Disposition:    Damion Score:  Damion Scale Score: 19    Readmission Risk              Risk of Unplanned Readmission:  14           Discussed patient goal for the day, patient clinical progression, and barriers to discharge.   The following Goal(s) of the Day/Commitment(s) have been identified:  check plan , monitor labs       Mike Ziegler RN  October 28, 2021

## 2021-10-28 NOTE — PROGRESS NOTES
[facility] Progress Note    Admitting Date and Time: 10/25/2021  8:59 PM  Admit Dx: Anemia requiring transfusions [D64.9]  Altered mental status, unspecified altered mental status type [R41.82]    Subjective:  Patient is being followed for Anemia requiring transfusions [D64.9]  Altered mental status, unspecified altered mental status type [R41.82]   Pt has trouble describing how he feels. His wife is at bedside. She is very concerned that he is bruising even more every day. Now having significant bruising on his posterior calves just from a light massage yesterday afternoon. The patient is also experiencing chorea type movements. He does not realize he is having these movements. ROS: denies fever, chills, cp, sob, n/v, HA unless stated above.       sodium chloride flush  5-40 mL IntraVENous 2 times per day    lisinopril  20 mg Oral Daily    And    hydroCHLOROthiazide  12.5 mg Oral Daily    amoxicillin-clavulanate  1 tablet Oral 3 times per day    pantoprazole  40 mg IntraVENous Daily     sodium chloride flush, 5-40 mL, PRN  sodium chloride, 25 mL, PRN  ondansetron, 4 mg, Q8H PRN   Or  ondansetron, 4 mg, Q6H PRN  acetaminophen, 650 mg, Q6H PRN   Or  acetaminophen, 650 mg, Q6H PRN  HYDROcodone-acetaminophen, 1 tablet, Q6H PRN  sodium chloride, , PRN         Objective:    BP (!) 99/59   Pulse 86   Temp 98.1 °F (36.7 °C) (Oral)   Resp 17   Ht 5' 8\" (1.727 m)   Wt 170 lb 3.2 oz (77.2 kg)   SpO2 97%   BMI 25.88 kg/m²     General Appearance: alert and oriented to person, place and time and in no acute distress  Skin: warm and dry  Head: normocephalic and atraumatic  Eyes: pupils equal, round, and reactive to light, extraocular eye movements intact, conjunctivae normal  Neck: neck supple and non tender without mass   Pulmonary/Chest: clear to auscultation bilaterally- no wheezes, rales or rhonchi, normal air movement, no respiratory distress  Cardiovascular: normal rate, normal S1 and S2 and no carotid bruits  Abdomen: soft, non-tender, non-distended, normal bowel sounds, no masses or organomegaly  Extremities: no cyanosis, no clubbing and no edema  Neurologic: no cranial nerve deficit and speech normal; DTRs intact; coordination is intact        Recent Labs     10/25/21  2149 10/27/21  0206 10/28/21  0641   * 136 139   K 4.8 4.2 3.9    106 106   CO2 19* 18* 18*   BUN 58* 42* 45*   CREATININE 2.0* 1.2 1.2   GLUCOSE 83 86 85   CALCIUM 8.4* 8.2* 8.5*       Recent Labs     10/25/21  2149 10/26/21  0921 10/27/21  0206 10/27/21  0206 10/27/21  1440 10/28/21  0040 10/28/21  0641   WBC 10.0  --  8.0  --   --   --  6.0   RBC 1.93*  --  2.36*  --   --   --  2.32*   HGB 6.1*   < > 7.4*  7.6*   < > 7.5* 7.1* 7.4*   HCT 18.7*   < > 22.3*  22.5*   < > 22.4* 21.4* 22.7*   MCV 96.9  --  95.3  --   --   --  97.8   MCH 31.6  --  32.2  --   --   --  31.9   MCHC 32.6  --  33.8  --   --   --  32.6   RDW 13.4  --  13.6  --   --   --  13.8     --  195  --   --   --  203   MPV 10.6  --  10.9  --   --   --  10.9    < > = values in this interval not displayed. Assessment:    Principal Problem:    Hemorrhagic complications of dental implant placement  Active Problems:    Anemia requiring transfusions    BPH (benign prostatic hyperplasia)    Left pleural effusion vs scarring    Significant ecchymoses    Chorea like movements (yet otherwise normal neuro exam)    Hemoccult positive-likely secondary to swallowing of blood from dental procedure    Lyme disease-was treat with abx and prednisone by PCP        Plan:  1. Pulmonology following  2. PTT elevated  3. Hematology consulted for bleeding disorder  4. Repeat CT head negative  5. Consult neurology for opinion on movements  6. Transfuse for hgb <7  7. Continue PPI and carafate  8. SLP following; recommend pureed solids and honey thickened liquids  9. Continue home meds for hypertension  10.   Patient has been off of prednisone during admission; appreciate opinion from hematology on resuming this medication          NOTE: This report was transcribed using voice recognition software. Every effort was made to ensure accuracy; however, inadvertent computerized transcription errors may be present.   Electronically signed by Baudilio Valle MD on 10/28/2021 at 5:25 PM

## 2021-10-28 NOTE — PROGRESS NOTES
Pulmonary Progress Note    Admit Date: 10/25/2021  Hospital day                               PCP: Humberto Soriano MD    Chief Complaint (s):  Patient Active Problem List   Diagnosis    Anemia requiring transfusions    Hemorrhagic complications of dental implant placement    BPH (benign prostatic hyperplasia)    Aspiration pneumonia (HCC)       Subjective:  · Awake, alert and anxious. Wife reports easy bruising with just trivial touching of the lower extremities. Vitals:  VITALS:  BP (!) 99/59   Pulse 86   Temp 98.1 °F (36.7 °C) (Oral)   Resp 17   Ht 5' 8\" (1.727 m)   Wt 170 lb 3.2 oz (77.2 kg)   SpO2 97%   BMI 25.88 kg/m²     24HR INTAKE/OUTPUT:      Intake/Output Summary (Last 24 hours) at 10/28/2021 1336  Last data filed at 10/28/2021 0846  Gross per 24 hour   Intake --   Output 1075 ml   Net -1075 ml       24HR PULSE OXIMETRY RANGE:    SpO2  Av.7 %  Min: 96 %  Max: 97 %    Medications:  IV:   sodium chloride      sodium chloride         Scheduled Meds:   sodium chloride flush  5-40 mL IntraVENous 2 times per day    lisinopril  20 mg Oral Daily    And    hydroCHLOROthiazide  12.5 mg Oral Daily    amoxicillin-clavulanate  1 tablet Oral 3 times per day    pantoprazole  40 mg IntraVENous Daily       Diet:   ADULT DIET; Dysphagia - Soft and Bite Sized     EXAM:  General: No distress. Alert. Speech is slurred. Eyes: PERRL. No sclera icterus. No conjunctival injection. ENT: No discharge. Pharynx clear. Neck: Trachea midline. Normal thyroid. Ecchymosis diffusely  Resp: No accessory muscle use. No rales. No wheezing. No rhonchi. CV: Regular rate. Regular rhythm. No murmur or rub. Abd: Non-tender. Non-distended. No masses. No organomegaly. Normal bowel sounds. Skin: Warm and dry. No nodule on exposed extremities. No rash on exposed extremities. Diffuse bruising. Ext: No cyanosis, clubbing, edema  Lymph: No cervical LAD. No supraclavicular LAD. M/S: No cyanosis. No joint deformity. No clubbing. Neuro: Awake. Follows commands. Positive pupils/gag/corneals. Normal pain response. Results:  CBC:   Recent Labs     10/25/21  2149 10/26/21  0921 10/27/21  0206 10/27/21  0206 10/27/21  1440 10/28/21  0040 10/28/21  0641   WBC 10.0  --  8.0  --   --   --  6.0   HGB 6.1*   < > 7.4*  7.6*   < > 7.5* 7.1* 7.4*   HCT 18.7*   < > 22.3*  22.5*   < > 22.4* 21.4* 22.7*   MCV 96.9  --  95.3  --   --   --  97.8     --  195  --   --   --  203    < > = values in this interval not displayed. BMP:   Recent Labs     10/25/21  2149 10/27/21  0206 10/28/21  0641   * 136 139   K 4.8 4.2 3.9    106 106   CO2 19* 18* 18*   BUN 58* 42* 45*   CREATININE 2.0* 1.2 1.2     LIVER PROFILE:   Recent Labs     10/25/21  2149 10/27/21  0206 10/28/21  0641   AST 42* 109* 92*   ALT 11 36 40   BILITOT 0.5 0.8 0.7   ALKPHOS 76 68 71     PT/INR:   Recent Labs     10/26/21  0921 10/27/21  1440   PROTIME 12.0 12.5*   INR 1.1 1.1     APTT:   Recent Labs     10/27/21  1440   APTT 141.0*         Pathology:  1. N/A      Microbiology:  1. None    Recent ABG:   No results for input(s): PH, PO2, PCO2, HCO3, BE, O2SAT, METHB, O2HB, COHB, O2CON, HHB, THB in the last 72 hours. Recent Films:  CT ABDOMEN PELVIS WO CONTRAST Additional Contrast? None   Final Result   Nonobstructive stones in the right kidney measuring less than 3 mm. No   obstructive uropathy. Bilateral perinephric stranding, nonspecific  and   which may be chronic. Small partially loculated left pleural effusion. Chronic interstitial and   pleuroparenchymal scarring in the lung bases. Sigmoid diverticulosis with no evidence of diverticulitis. CT head without contrast   Final Result   No acute intracranial abnormality. XR CHEST PORTABLE   Final Result   Atelectasis versus infiltrate at left lung base. CT HEAD WO CONTRAST    (Results Pending)                Assessment:  1.  Hypoxia: Changes noted at both lung bases suggest a modest amount of interstitial lung disease. 2. The left effusion appears as pleural thickening and scarring more so than fluid. 3. Diffuse bruising. Elevated PTT. See below            Plan:  1. Await Hematology opinion        Time at the bedside, reviewing labs and radiographs, reviewing updated notes and consultations, discussing with staff and family was more than 35 minutes. Please note that voice recognition technology was used in the preparation of this note and make therefore it may contain inadvertent transcription errors. If the patient is a COVID 19 isolation patient, the above physical exam reflects that of the examining physician for the day. Anabel Zhu MD,  M.D., F.C.C.P.     [facility], [address] [place] [address], [address] [place] [address]

## 2021-10-29 LAB
ADENOVIRUS BY PCR: NOT DETECTED
BORDETELLA PARAPERTUSSIS BY PCR: NOT DETECTED
BORDETELLA PERTUSSIS BY PCR: NOT DETECTED
CHLAMYDOPHILIA PNEUMONIAE BY PCR: NOT DETECTED
CORONAVIRUS 229E BY PCR: NOT DETECTED
CORONAVIRUS HKU1 BY PCR: NOT DETECTED
CORONAVIRUS NL63 BY PCR: NOT DETECTED
CORONAVIRUS OC43 BY PCR: NOT DETECTED
HCT VFR BLD CALC: 21.3 % (ref 37–54)
HCT VFR BLD CALC: 24.2 % (ref 37–54)
HEMOGLOBIN: 7.1 G/DL (ref 12.5–16.5)
HEMOGLOBIN: 7.8 G/DL (ref 12.5–16.5)
HUMAN METAPNEUMOVIRUS BY PCR: NOT DETECTED
HUMAN RHINOVIRUS/ENTEROVIRUS BY PCR: NOT DETECTED
INFLUENZA A BY PCR: NOT DETECTED
INFLUENZA B BY PCR: NOT DETECTED
MYCOPLASMA PNEUMONIAE BY PCR: NOT DETECTED
PARAINFLUENZA VIRUS 1 BY PCR: NOT DETECTED
PARAINFLUENZA VIRUS 2 BY PCR: NOT DETECTED
PARAINFLUENZA VIRUS 3 BY PCR: NOT DETECTED
PARAINFLUENZA VIRUS 4 BY PCR: NOT DETECTED
RESPIRATORY SYNCYTIAL VIRUS BY PCR: NOT DETECTED
SARS-COV-2, PCR: NOT DETECTED

## 2021-10-29 PROCEDURE — 6370000000 HC RX 637 (ALT 250 FOR IP): Performed by: INTERNAL MEDICINE

## 2021-10-29 PROCEDURE — 2580000003 HC RX 258: Performed by: INTERNAL MEDICINE

## 2021-10-29 PROCEDURE — 85240 CLOT FACTOR VIII AHG 1 STAGE: CPT

## 2021-10-29 PROCEDURE — 36415 COLL VENOUS BLD VENIPUNCTURE: CPT

## 2021-10-29 PROCEDURE — 85245 CLOT FACTOR VIII VW RISTOCTN: CPT

## 2021-10-29 PROCEDURE — 85250 CLOT FACTOR IX PTC/CHRSTMAS: CPT

## 2021-10-29 PROCEDURE — 97530 THERAPEUTIC ACTIVITIES: CPT

## 2021-10-29 PROCEDURE — 85018 HEMOGLOBIN: CPT

## 2021-10-29 PROCEDURE — 86618 LYME DISEASE ANTIBODY: CPT

## 2021-10-29 PROCEDURE — 85014 HEMATOCRIT: CPT

## 2021-10-29 PROCEDURE — 85260 CLOT FACTOR X STUART-POWER: CPT

## 2021-10-29 PROCEDURE — 92526 ORAL FUNCTION THERAPY: CPT | Performed by: SPEECH-LANGUAGE PATHOLOGIST

## 2021-10-29 PROCEDURE — 85613 RUSSELL VIPER VENOM DILUTED: CPT

## 2021-10-29 PROCEDURE — 85230 CLOT FACTOR VII PROCONVERTIN: CPT

## 2021-10-29 PROCEDURE — 99232 SBSQ HOSP IP/OBS MODERATE 35: CPT | Performed by: FAMILY MEDICINE

## 2021-10-29 PROCEDURE — 2060000000 HC ICU INTERMEDIATE R&B

## 2021-10-29 PROCEDURE — 6360000002 HC RX W HCPCS: Performed by: INTERNAL MEDICINE

## 2021-10-29 PROCEDURE — 85611 PROTHROMBIN TEST: CPT

## 2021-10-29 PROCEDURE — C9113 INJ PANTOPRAZOLE SODIUM, VIA: HCPCS | Performed by: INTERNAL MEDICINE

## 2021-10-29 PROCEDURE — 85246 CLOT FACTOR VIII VW ANTIGEN: CPT

## 2021-10-29 PROCEDURE — 82570 ASSAY OF URINE CREATININE: CPT

## 2021-10-29 RX ADMIN — AMOXICILLIN AND CLAVULANATE POTASSIUM 1 TABLET: 500; 125 TABLET, FILM COATED ORAL at 23:59

## 2021-10-29 RX ADMIN — AMOXICILLIN AND CLAVULANATE POTASSIUM 1 TABLET: 500; 125 TABLET, FILM COATED ORAL at 07:11

## 2021-10-29 RX ADMIN — AMOXICILLIN AND CLAVULANATE POTASSIUM 1 TABLET: 500; 125 TABLET, FILM COATED ORAL at 14:03

## 2021-10-29 RX ADMIN — Medication 10 ML: at 23:59

## 2021-10-29 RX ADMIN — PANTOPRAZOLE SODIUM 40 MG: 40 INJECTION, POWDER, FOR SOLUTION INTRAVENOUS at 10:18

## 2021-10-29 RX ADMIN — Medication 10 ML: at 10:19

## 2021-10-29 ASSESSMENT — PAIN SCALES - GENERAL
PAINLEVEL_OUTOF10: 0

## 2021-10-29 NOTE — PROGRESS NOTES
Physical Therapy    Facility/Department: [address] INTERMEDIATE  Treatment note    NAME: Jacqueline Linares  : 1950  MRN: [id number]    Date of Service: 10/29/2021               Patient Diagnosis(es): The primary encounter diagnosis was Anemia requiring transfusions. A diagnosis of Altered mental status, unspecified altered mental status type was also pertinent to this visit. has a past medical history of Hypertension and Lyme disease. has a past surgical history that includes other surgical history. Evaluating Therapist: Armani Velasquez PT     Referring Provider:  Savanah De La Cruz MD    PT order : PT eval and treat     Room #:  629   DIAGNOSIS: anemia , AMS   PRECAUTIONS: falls      Social:  Pt lives with  Wife  in a  1-1/2  floor plan  With first floor set up, 1  step to enter. 1 step into sunken living room   Prior to admission pt walked with no AD, independent, works      Initial Evaluation  Date:  10/27/2021  Treatment  10/29/21    Short Term/ Long Term   Goals   Was pt agreeable to Eval/treatment? yes  yes    Does pt have pain?  mouth, butt  Mouth pain    Bed Mobility  Rolling: NT   Supine to sit:  Max assist   Sit to supine:  NT   Scooting:   Mod assist in sit  Rolling: SBA  Supine to sit: SBA  Scooting: SBA seated to EOB SBA    Transfers Sit to stand:  Min/mod assist   Stand to sit:  Min assist   Stand pivot:  NT Sit <> stand SBA  SBA    Ambulation     15 and 45  feet with  ww  with  CGA/min assist . 10 feet with no AD mod assist  60 feet x 1 using SW for support per Pt preference SBA for balance  150  feet with  AAD  with  SBA        Stair negotiation: ascended and descended NT  NT  1-2  steps with  B  rail with  SBA   LE ROM  WFL     LE strength  3+ to 4-/ 5   4/5    AM- PAC RAW score   15 24  17            Pt is alert and able to follow instruction  Balance: fair dynamic using SW for support    Pt performed therapeutic exercise of the following: NT    Patient education  Pt was educated on UE usage to assist with transfers    Patient response to education:   Pt verbalized understanding Pt demonstrated skill Pt requires further education in this area   yes With instruction yes     ASSESSMENT:   Comments: Nurse ok with Rx. Pt found in bed, agreed to rx. Pt sat EOB SBA, gait performed in the room. Amber slow and consistent, no loss of balance noted. Pt states feels he is in need of using a walker presently, states ok using the SW as opposed to the [employer]. Pt fatigued after activity. Treatment: Pt practiced and was instructed in the following treatment: transfer safety    Pt was left in a bedside chair with call light in reach. Time in 0932   Time out 0948   Total Treatment Time 16 minutes   CPT codes:     Therapeutic activities 47214 16 minutes   Therapeutic exercises 53982 0 minutes       Pt is making good progress toward established Physical Therapy goals. Continue with physical therapy current plan of care.     Jesús Barkley PTA   License Number: [id number]

## 2021-10-29 NOTE — PROGRESS NOTES
Pt's family alerted staff that while pt was in the bathroom he fell to floor striking his elbow on the wall. Pt states that he was not dizzy when he fell, states that he was washing his hands and saw some toilet paper on the floor, states when he reached for it he lost his balance. Pt denies head injury, neuro check negative at this time. No obvious injury on patient except skin tear to right elbow. Family present at bedside. Dr. Jennifer Benton notified.

## 2021-10-29 NOTE — PROGRESS NOTES
P Quality Flow/Interdisciplinary Rounds Progress Note        Quality Flow Rounds held on October 29, 2021    Disciplines Attending:  Bedside Nurse, ,  and Nursing Unit Leadership    Dipesh Purcell was admitted on 10/25/2021  8:59 PM    Anticipated Discharge Date:  Expected Discharge Date: 10/31/21    Disposition:    Damion Score:  Damion Scale Score: 19    Readmission Risk              Risk of Unplanned Readmission:  14           Discussed patient goal for the day, patient clinical progression, and barriers to discharge. The following Goal(s) of the Day/Commitment(s) have been identified:  await transfer to [facility].        Rebeka Cochran RN  October 29, 2021

## 2021-10-29 NOTE — PROGRESS NOTES
Pulmonary Progress Note    Admit Date: 10/25/2021  Hospital day                               PCP: Quinn Adamson MD    Chief Complaint (s):  Patient Active Problem List   Diagnosis    Anemia requiring transfusions    Hemorrhagic complications of dental implant placement    BPH (benign prostatic hyperplasia)    Aspiration pneumonia ([facility] [address]       Subjective:  · Awake and alert, doing better. Case reviewed with hematology at length. Vitals:  VITALS:  BP (!) 98/50 Comment: manual  Pulse 84   Temp 99.1 °F (37.3 °C) (Oral)   Resp 20   Ht 5' 8\" (1.727 m)   Wt 170 lb 3.2 oz (77.2 kg)   SpO2 96%   BMI 25.88 kg/m²     24HR INTAKE/OUTPUT:      Intake/Output Summary (Last 24 hours) at 10/29/2021 1609  Last data filed at 10/29/2021 0604  Gross per 24 hour   Intake --   Output 700 ml   Net -700 ml       24HR PULSE OXIMETRY RANGE:    SpO2  Av.5 %  Min: 95 %  Max: 96 %    Medications:  IV:   sodium chloride      sodium chloride         Scheduled Meds:   sodium chloride flush  5-40 mL IntraVENous 2 times per day    lisinopril  20 mg Oral Daily    And    hydroCHLOROthiazide  12.5 mg Oral Daily    amoxicillin-clavulanate  1 tablet Oral 3 times per day    pantoprazole  40 mg IntraVENous Daily       Diet:   ADULT DIET; Dysphagia - Pureed; Moderately Thick (Honey)     EXAM:  General: No distress. Alert. Speech is slurred. Eyes: PERRL. No sclera icterus. No conjunctival injection. ENT: No discharge. Pharynx clear. Neck: Trachea midline. Normal thyroid. Ecchymosis diffusely  Resp: No accessory muscle use. No rales. No wheezing. No rhonchi. CV: Regular rate. Regular rhythm. No murmur or rub. Abd: Non-tender. Non-distended. No masses. No organomegaly. Normal bowel sounds. Skin: Warm and dry. No nodule on exposed extremities. No rash on exposed extremities. Diffuse bruising. Ext: No cyanosis, clubbing, edema  Lymph: No cervical LAD. No supraclavicular LAD. M/S: No cyanosis.  No joint deformity. No clubbing. Neuro: Awake. Follows commands. Positive pupils/gag/corneals. Normal pain response. Results:  CBC:   Recent Labs     10/27/21  0206 10/27/21  1440 10/28/21  0641 10/28/21  1805 10/29/21  0415   WBC 8.0  --  6.0 6.2  --    HGB 7.4*  7.6*   < > 7.4* 7.9* 7.1*   HCT 22.3*  22.5*   < > 22.7* 23.9* 21.3*   MCV 95.3  --  97.8 97.6  --      --  203 239  --     < > = values in this interval not displayed. BMP:   Recent Labs     10/27/21  0206 10/28/21  0641    139   K 4.2 3.9    106   CO2 18* 18*   BUN 42* 45*   CREATININE 1.2 1.2     LIVER PROFILE:   Recent Labs     10/27/21  0206 10/28/21  0641   * 92*   ALT 36 40   BILITOT 0.8 0.7   ALKPHOS 68 71     PT/INR:   Recent Labs     10/27/21  1440   PROTIME 12.5*   INR 1.1     APTT:   Recent Labs     10/27/21  1440   APTT 141.0*         Pathology:  1. N/A      Microbiology:  1. None    Recent ABG:   No results for input(s): PH, PO2, PCO2, HCO3, BE, O2SAT, METHB, O2HB, COHB, O2CON, HHB, THB in the last 72 hours. Recent Films:  CT HEAD WO CONTRAST   Final Result   No acute intracranial process is identified. CT ABDOMEN PELVIS WO CONTRAST Additional Contrast? None   Final Result   Nonobstructive stones in the right kidney measuring less than 3 mm. No   obstructive uropathy. Bilateral perinephric stranding, nonspecific  and   which may be chronic. Small partially loculated left pleural effusion. Chronic interstitial and   pleuroparenchymal scarring in the lung bases. Sigmoid diverticulosis with no evidence of diverticulitis. CT head without contrast   Final Result   No acute intracranial abnormality. XR CHEST PORTABLE   Final Result   Atelectasis versus infiltrate at left lung base.                      Assessment:  1. Hypoxia: Changes noted at both lung bases suggest a modest amount of interstitial lung disease.     2. The left effusion appears as pleural thickening and scarring more so than fluid. 3. Diffuse bruising. Elevated PTT. See below            Plan:  1. No further plans from pulmonary        Time at the bedside, reviewing labs and radiographs, reviewing updated notes and consultations, discussing with staff and family was more than 35 minutes. Please note that voice recognition technology was used in the preparation of this note and make therefore it may contain inadvertent transcription errors. If the patient is a COVID 19 isolation patient, the above physical exam reflects that of the examining physician for the day. Brody Romeo MD,  MTIFFANIE., F.C.C.P.     [facility], [address] [place] [address], [address], [facility]

## 2021-10-29 NOTE — PROGRESS NOTES
[facility] Progress Note    Admitting Date and Time: 10/25/2021  8:59 PM  Admit Dx: Anemia requiring transfusions [D64.9]  Altered mental status, unspecified altered mental status type [R41.82]    Subjective:  Patient is being followed for Anemia requiring transfusions [D64.9]  Altered mental status, unspecified altered mental status type [R41.82]   Pt feels a little better today than yesterday. His movements are reduced. His appetite is good. ROS: denies fever, chills, cp, sob, n/v, HA unless stated above.       sodium chloride flush  5-40 mL IntraVENous 2 times per day    lisinopril  20 mg Oral Daily    And    hydroCHLOROthiazide  12.5 mg Oral Daily    amoxicillin-clavulanate  1 tablet Oral 3 times per day    pantoprazole  40 mg IntraVENous Daily     sodium chloride flush, 5-40 mL, PRN  sodium chloride, 25 mL, PRN  ondansetron, 4 mg, Q8H PRN   Or  ondansetron, 4 mg, Q6H PRN  acetaminophen, 650 mg, Q6H PRN   Or  acetaminophen, 650 mg, Q6H PRN  HYDROcodone-acetaminophen, 1 tablet, Q6H PRN  sodium chloride, , PRN         Objective:    BP (!) 115/54   Pulse 84   Temp 99.1 °F (37.3 °C) (Oral)   Resp 20   Ht 5' 8\" (1.727 m)   Wt 170 lb 3.2 oz (77.2 kg)   SpO2 96%   BMI 25.88 kg/m²     General Appearance: alert and oriented to person, place and time and in no acute distress  Skin: warm and dry  Head: normocephalic and atraumatic  Eyes: pupils equal, round, and reactive to light, extraocular eye movements intact, conjunctivae normal  Neck: neck supple and non tender without mass   Pulmonary/Chest: clear to auscultation bilaterally- no wheezes, rales or rhonchi, normal air movement, no respiratory distress  Cardiovascular: normal rate, normal S1 and S2 and no carotid bruits  Abdomen: soft, non-tender, non-distended, normal bowel sounds, no masses or organomegaly  Extremities: no cyanosis, no clubbing and no edema  Neurologic: no cranial nerve deficit and speech normal; DTRs intact; coordination is intact        Recent Labs     10/27/21  0206 10/28/21  0641    139   K 4.2 3.9    106   CO2 18* 18*   BUN 42* 45*   CREATININE 1.2 1.2   GLUCOSE 86 85   CALCIUM 8.2* 8.5*       Recent Labs     10/27/21  0206 10/27/21  1440 10/28/21  0641 10/28/21  1805 10/29/21  0415   WBC 8.0  --  6.0 6.2  --    RBC 2.36*  --  2.32* 2.45*  --    HGB 7.4*  7.6*   < > 7.4* 7.9* 7.1*   HCT 22.3*  22.5*   < > 22.7* 23.9* 21.3*   MCV 95.3  --  97.8 97.6  --    MCH 32.2  --  31.9 32.2  --    MCHC 33.8  --  32.6 33.1  --    RDW 13.6  --  13.8 13.5  --      --  203 239  --    MPV 10.9  --  10.9 11.3  --     < > = values in this interval not displayed. Assessment:    Principal Problem:    Hemorrhagic complications of dental implant placement  Active Problems:    Anemia requiring transfusions-monitor hgb closely and transfuse for hgb <7    BPH (benign prostatic hyperplasia)    Left pleural effusion vs scarring-pulmonology following    Significant ecchymoses-hematology consulted; concern for acquired hemophilia    Chorea like movements (yet otherwise normal neuro exam)-awaiting neuro consult; CT heat without bleed; consider MRI    Hemoccult positive-likely secondary to swallowing of blood from dental procedure    Lyme disease (dx dec 2018)-was treated with abx and prednisone by PCP        Plan:  1. Pulmonology following  2. PTT elevated  3. Hematology consulted for bleeding disorder-recommend that the patient be transferred to tertiary care facility. Transfer initiated. [facility] wanting call from hematology for further information. 4.  Repeat CT head negative  5. Consulted neurology for opinion on movements, however no neuro coverage here until January. 6.  Transfuse for hgb <7  7. Continue PPI and carafate  8. SLP following; recommend pureed solids and honey thickened liquids  9. Continue home meds for hypertension  10.   Patient has been off of prednisone during admission; appreciate opinion from hematology on resuming this medication          NOTE: This report was transcribed using voice recognition software. Every effort was made to ensure accuracy; however, inadvertent computerized transcription errors may be present.   Electronically signed by Bethany Dia MD on 10/29/2021 at 8:52 AM

## 2021-10-29 NOTE — PROGRESS NOTES
Subjective: The patient is awake and alert. No problems overnight. Denies chest pain, angina, dyspnea or abdominal discomfort. No nausea or vomiting. Tolerating diet. Wife report some improvement in his jerking chorea like movements and speech. Objective:    BP (!) 98/50 Comment: manual  Pulse 84   Temp 99.1 °F (37.3 °C) (Oral)   Resp 20   Ht 5' 8\" (1.727 m)   Wt 170 lb 3.2 oz (77.2 kg)   SpO2 96%   BMI 25.88 kg/m²     General: NAD  HEENT: No thrush or mucositis, EOMI  Heart:  RRR, no murmurs, gallops, or rubs. Lungs:  CTA bilaterally, no wheeze, rales or rhonchi  Abd: BS present, nontender, nondistended, no masses  Extrem:  No clubbing, cyanosis, or edema  Lymphatics: No palpable adenopathy in cervical and supraclavicular regions  Skin: Extensive bruising involving face, neck, chest, all extremities.       CBC with Differential:    Lab Results   Component Value Date    WBC 6.2 10/28/2021    RBC 2.45 10/28/2021    HGB 7.1 10/29/2021    HCT 21.3 10/29/2021     10/28/2021    MCV 97.6 10/28/2021    MCH 32.2 10/28/2021    MCHC 33.1 10/28/2021    RDW 13.5 10/28/2021    METASPCT 1.0 02/07/2019    LYMPHOPCT 16.7 10/28/2021    MONOPCT 6.3 10/28/2021    MYELOPCT 3.0 02/07/2019    BASOPCT 0.3 10/28/2021    MONOSABS 0.39 10/28/2021    LYMPHSABS 1.04 10/28/2021    EOSABS 0.07 10/28/2021    BASOSABS 0.02 10/28/2021     CMP:    Lab Results   Component Value Date     10/28/2021    K 3.9 10/28/2021    K 4.2 10/27/2021     10/28/2021    CO2 18 10/28/2021    BUN 45 10/28/2021    CREATININE 1.2 10/28/2021    GFRAA >60 10/28/2021    LABGLOM 60 10/28/2021    GLUCOSE 85 10/28/2021    PROT 5.5 10/28/2021    LABALBU 2.9 10/28/2021    CALCIUM 8.5 10/28/2021    BILITOT 0.7 10/28/2021    ALKPHOS 71 10/28/2021    AST 92 10/28/2021    ALT 40 10/28/2021      [facility]:[id number]}     Current Facility-Administered Medications:     sodium chloride flush 0.9 % injection 5-40 mL, 5-40 mL, IntraVENous, 2 times per day, Ai Smith MD, 10 mL at 10/29/21 1019    sodium chloride flush 0.9 % injection 5-40 mL, 5-40 mL, IntraVENous, PRN, Ai Smith MD    0.9 % sodium chloride infusion, 25 mL, IntraVENous, PRN, Ai Smith MD    ondansetron (ZOFRAN-ODT) disintegrating tablet 4 mg, 4 mg, Oral, Q8H PRN **OR** ondansetron (ZOFRAN) injection 4 mg, 4 mg, IntraVENous, Q6H PRN, Ai Smith MD    acetaminophen (TYLENOL) tablet 650 mg, 650 mg, Oral, Q6H PRN, 650 mg at 10/28/21 2041 **OR** acetaminophen (TYLENOL) suppository 650 mg, 650 mg, Rectal, Q6H PRN, Ai Smith MD    HYDROcodone-acetaminophen [facility])  MG per tablet 1 tablet, 1 tablet, Oral, Q6H PRN, Ai Smith MD    lisinopril (PRINIVIL;ZESTRIL) tablet 20 mg, 20 mg, Oral, Daily, 20 mg at 10/28/21 1003 **AND** hydroCHLOROthiazide (HYDRODIURIL) tablet 12.5 mg, 12.5 mg, Oral, Daily, Clyde Salazar MD, 12.5 mg at 10/28/21 1003    amoxicillin-clavulanate (AUGMENTIN) 500-125 MG per tablet 1 tablet, 1 tablet, Oral, 3 times per day, Ai Smith MD, 1 tablet at 10/29/21 1403    pantoprazole (PROTONIX) injection 40 mg, 40 mg, IntraVENous, Daily, Ai Smith MD, 40 mg at 10/29/21 1018    0.9 % sodium chloride infusion, , IntraVENous, PRN, Dave Green MD    CT HEAD WO CONTRAST   Final Result   No acute intracranial process is identified. CT ABDOMEN PELVIS WO CONTRAST Additional Contrast? None   Final Result   Nonobstructive stones in the right kidney measuring less than 3 mm. No   obstructive uropathy. Bilateral perinephric stranding, nonspecific  and   which may be chronic. Small partially loculated left pleural effusion. Chronic interstitial and   pleuroparenchymal scarring in the lung bases. Sigmoid diverticulosis with no evidence of diverticulitis. CT head without contrast   Final Result   No acute intracranial abnormality.          XR CHEST PORTABLE   Final Result   Atelectasis versus infiltrate at left lung base.             Assessment:    Principal Problem:    Hemorrhagic complications of dental implant placement  Active Problems:    Anemia requiring transfusions    BPH (benign prostatic hyperplasia)    Aspiration pneumonia (HCC)  Resolved Problems:    * No resolved hospital problems. *    Elevated PTT  Anemia secondary to acute blood loss  Extensive bruising/bleeding  Small L pleural scarring   Aspiration PNA   Hx of Lyme disease on chronic prednisone     Plan:  I am concerned about require hemophilia, inhibitor versus factor deficiency. He has had significant bleeding resulting in significant decline in his hgb count. Also has chorea like jerking movement that have somewhat improved today according to the wife. Awaiting factor assay, factor 8 inhibitor and von willbrand antigen along with additional lab work but will likely take long due to send out. Given his risk of bleeding, I would favor transfer to [facility] where labs can be done more quickly and products can be given swiftly. No evidence of DIC or hemolysis. If further oral bleeding, then can give Amicar. I have discussed transfer with patient and wife and they are in agreement. This morning I spoke to [facility] transfer team and left my cell phone number with them to give to [facility] attending call if they have any questions. I have not received any call back.        Electronically signed by Lima Carpenter MD on 10/29/2021 at 5:08 PM

## 2021-10-29 NOTE — CARE COORDINATION
Social Work discharge [facility] spoke to pt's wife. Noted plan if [place], but Sw still made snf referrals in case no beds at [place]. They have chosen 1. [place] - ref called to Keven Martinez 2. [address] - ref called to [place] and 3. [facility] - ref called to [address] started. Ambulance forms for possible [facility] Transfer with [facility] [phone] also put in folder. Social Work to follow for support and discharge planning with CM.   Electronically signed by Marah Ramirez on 10/29/2021 at 10:38 AM

## 2021-10-30 LAB
HCT VFR BLD CALC: 21.2 % (ref 37–54)
HCT VFR BLD CALC: 22.3 % (ref 37–54)
HEMOGLOBIN: 7 G/DL (ref 12.5–16.5)
HEMOGLOBIN: 7.4 G/DL (ref 12.5–16.5)

## 2021-10-30 PROCEDURE — 99232 SBSQ HOSP IP/OBS MODERATE 35: CPT | Performed by: FAMILY MEDICINE

## 2021-10-30 PROCEDURE — 36415 COLL VENOUS BLD VENIPUNCTURE: CPT

## 2021-10-30 PROCEDURE — C9113 INJ PANTOPRAZOLE SODIUM, VIA: HCPCS | Performed by: INTERNAL MEDICINE

## 2021-10-30 PROCEDURE — 6370000000 HC RX 637 (ALT 250 FOR IP): Performed by: INTERNAL MEDICINE

## 2021-10-30 PROCEDURE — 85018 HEMOGLOBIN: CPT

## 2021-10-30 PROCEDURE — 6360000002 HC RX W HCPCS: Performed by: INTERNAL MEDICINE

## 2021-10-30 PROCEDURE — 2060000000 HC ICU INTERMEDIATE R&B

## 2021-10-30 PROCEDURE — 2580000003 HC RX 258: Performed by: INTERNAL MEDICINE

## 2021-10-30 PROCEDURE — 6370000000 HC RX 637 (ALT 250 FOR IP): Performed by: FAMILY MEDICINE

## 2021-10-30 PROCEDURE — 85014 HEMATOCRIT: CPT

## 2021-10-30 RX ADMIN — AMOXICILLIN AND CLAVULANATE POTASSIUM 1 TABLET: 500; 125 TABLET, FILM COATED ORAL at 21:40

## 2021-10-30 RX ADMIN — Medication 5 ML: at 10:02

## 2021-10-30 RX ADMIN — LISINOPRIL 20 MG: 20 TABLET ORAL at 10:01

## 2021-10-30 RX ADMIN — AMOXICILLIN AND CLAVULANATE POTASSIUM 1 TABLET: 500; 125 TABLET, FILM COATED ORAL at 13:44

## 2021-10-30 RX ADMIN — PANTOPRAZOLE SODIUM 40 MG: 40 INJECTION, POWDER, FOR SOLUTION INTRAVENOUS at 10:02

## 2021-10-30 RX ADMIN — HYDROCHLOROTHIAZIDE 12.5 MG: 12.5 TABLET ORAL at 10:02

## 2021-10-30 RX ADMIN — AMOXICILLIN AND CLAVULANATE POTASSIUM 1 TABLET: 500; 125 TABLET, FILM COATED ORAL at 05:25

## 2021-10-30 ASSESSMENT — PAIN SCALES - GENERAL
PAINLEVEL_OUTOF10: 0

## 2021-10-30 NOTE — PROGRESS NOTES
S: Patient awake, no further oral bleeding, bruising present face and arms    O: Temp 98.4 P-90 R-14 Bp116/56  GEn- alert, sitting in chair, no distress, wife at bedside  HEENT- mmm, bruising right inner cheek and on right side of face  Neck- no adenopathy  Lungs- clear  Cardiac- RRR  abd- soft /nt/nd  Ext- edema right lower leg more than left      Labs:  PTT today pending, Hb=7.4, VWF antigen 10-29 pending  Factor 8 and factor 9 from 10-29 pending  10-27 IPX=371 , INR=1.1  10-28 fibrinogen >700    A/P:  80year old gentleman diagnosed and treated for lyme's disease 3 years ago , states he was soon after placed on prednisone chronically for joint pains. He denies any prior bleeding problems with prior dental extractions and implants and underwent right molar extraction and implant last week complicated by severe ongoing bleeding that led to admission for anemia and discovered to have prolonged PTT of 141 most worrisome for acquired hemophilia or acquired Von Willebrands disease. 1. Unfortunately, the lab at this hospital does not perform factor levels, vwf levels and tests sent out 10-29 are still pending and may take a few days to get back . PTT Mixing study was ordered 10-29 and reportedly will not be done until Monday 11-1. He was recommended to be transferred to [facility] where labs can be done more quickly and products can be given swiftly if needed. He has been accepted at [facility] and is waiting for a bed. 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 2. Nikki Grant spoke with the [facility] transfer team yesterday and left his cell phone number with them to give to [facility] attending  so that they could call if they have any questions. 3. Daily PTT  4. Send rheumatoid factor and AMARI screen as well    Addendum (10-31-21): Patient still without bed at tertiary care center. Will begin treatment with prednisone 1mg/kg/day for high suspicious factor 8 or 9 inhibitor in the meantime while await test results and/or transfer. No active bleeding at this time.      Ketan Ordonez MD

## 2021-10-30 NOTE — PROGRESS NOTES
[facility] access center called no bed available yet at [facility]

## 2021-10-30 NOTE — PLAN OF CARE
Problem: Falls - Risk of:  Goal: Will remain free from falls  Description: Will remain free from falls  Outcome: Not Met This Shift  Goal: Absence of physical injury  Description: Absence of physical injury  Outcome: Not Met This Shift     Problem: Injury - Risk of, Physical Injury:  Goal: Will remain free from falls  Description: Will remain free from falls  Outcome: Not Met This Shift  Goal: Absence of physical injury  Description: Absence of physical injury  Outcome: Not Met This Shift

## 2021-10-30 NOTE — PROGRESS NOTES
[facility] Progress Note    Admitting Date and Time: 10/25/2021  8:59 PM  Admit Dx: Anemia requiring transfusions [D64.9]  Altered mental status, unspecified altered mental status type [R41.82]    Subjective:  Patient is being followed for Anemia requiring transfusions [D64.9]  Altered mental status, unspecified altered mental status type [R41.82]   Pt is eating better today. His appetite is improving. His chorea type movements are reduced significantly. He did have a fall with abrasion to his elbow yesterday evening. ROS: denies fever, chills, cp, sob, n/v, HA unless stated above.       sodium chloride flush  5-40 mL IntraVENous 2 times per day    lisinopril  20 mg Oral Daily    And    hydroCHLOROthiazide  12.5 mg Oral Daily    amoxicillin-clavulanate  1 tablet Oral 3 times per day    pantoprazole  40 mg IntraVENous Daily     sodium chloride flush, 5-40 mL, PRN  sodium chloride, 25 mL, PRN  ondansetron, 4 mg, Q8H PRN   Or  ondansetron, 4 mg, Q6H PRN  acetaminophen, 650 mg, Q6H PRN   Or  acetaminophen, 650 mg, Q6H PRN  HYDROcodone-acetaminophen, 1 tablet, Q6H PRN  sodium chloride, , PRN         Objective:    BP (!) 116/56   Pulse 90   Temp 98.4 °F (36.9 °C) (Oral)   Resp 14   Ht 5' 8\" (1.727 m)   Wt 169 lb (76.7 kg)   SpO2 94%   BMI 25.70 kg/m²     General Appearance: alert and oriented to person, place and time and in no acute distress  Skin: warm and dry  Head: normocephalic and atraumatic  Eyes: pupils equal, round, and reactive to light, extraocular eye movements intact, conjunctivae normal  Neck: neck supple and non tender without mass   Pulmonary/Chest: clear to auscultation bilaterally- no wheezes, rales or rhonchi, normal air movement, no respiratory distress  Cardiovascular: normal rate, normal S1 and S2 and no carotid bruits  Abdomen: soft, non-tender, non-distended, normal bowel sounds, no masses or organomegaly  Extremities: no cyanosis, no clubbing and no edema  Neurologic: no cranial nerve deficit and speech normal; DTRs intact; coordination is intact        Recent Labs     10/28/21  0641      K 3.9      CO2 18*   BUN 45*   CREATININE 1.2   GLUCOSE 85   CALCIUM 8.5*       Recent Labs     10/28/21  0641 10/28/21  0641 10/28/21  1805 10/28/21  1805 10/29/21  0415 10/29/21  1734 10/30/21  0545   WBC 6.0  --  6.2  --   --   --   --    RBC 2.32*  --  2.45*  --   --   --   --    HGB 7.4*   < > 7.9*   < > 7.1* 7.8* 7.4*   HCT 22.7*   < > 23.9*   < > 21.3* 24.2* 22.3*   MCV 97.8  --  97.6  --   --   --   --    MCH 31.9  --  32.2  --   --   --   --    MCHC 32.6  --  33.1  --   --   --   --    RDW 13.8  --  13.5  --   --   --   --      --  239  --   --   --   --    MPV 10.9  --  11.3  --   --   --   --     < > = values in this interval not displayed. Assessment:    Principal Problem:    Hemorrhagic complications of dental implant placement  Active Problems:    Anemia requiring transfusions-monitor hgb closely and transfuse for hgb <7    BPH (benign prostatic hyperplasia)    Left pleural effusion vs scarring-pulmonology following    Significant ecchymoses-hematology consulted; concern for acquired hemophilia    Chorea like movements (yet otherwise normal neuro exam)-awaiting neuro consult; CT heat without bleed; consider MRI    Hemoccult positive-likely secondary to swallowing of blood from dental procedure    Lyme disease (dx dec 2018)-was treated with abx and prednisone by PCP        Plan:  1. Pulmonology following  2. PTT elevated  3. Hematology consulted for bleeding disorder-recommend that the patient be transferred to tertiary care facility. Transfer initiated. No beds yet available for transfer. [facility] wanting call from hematology for further information. 4.  Repeat CT head negative  5. Consulted neurology for opinion on movements, however no neuro coverage here until January. 6.  Transfuse for hgb <7  7. Continue PPI and carafate  8. SLP following; recommend pureed solids and honey thickened liquids  9. Continue home meds for hypertension  10. Patient has been off of prednisone during admission; appreciate opinion from hematology on resuming this medication          NOTE: This report was transcribed using voice recognition software. Every effort was made to ensure accuracy; however, inadvertent computerized transcription errors may be present.   Electronically signed by Ronnie Martinez MD on 10/30/2021 at 1:41 PM

## 2021-10-31 LAB
ABO/RH: NORMAL
ANTIBODY SCREEN: NORMAL
APTT: >240 SEC (ref 24.5–35.1)
BLOOD BANK DISPENSE STATUS: NORMAL
BLOOD BANK PRODUCT CODE: NORMAL
BLOOD CULTURE, ROUTINE: NORMAL
BPU ID: NORMAL
CULTURE, BLOOD 2: NORMAL
DESCRIPTION BLOOD BANK: NORMAL
HCT VFR BLD CALC: 20.3 % (ref 37–54)
HCT VFR BLD CALC: 25.1 % (ref 37–54)
HEMOGLOBIN: 6.7 G/DL (ref 12.5–16.5)
HEMOGLOBIN: 8 G/DL (ref 12.5–16.5)
LUPUS ANTICOAG DVVT: NORMAL
RHEUMATOID FACTOR: 12 IU/ML (ref 0–13)

## 2021-10-31 PROCEDURE — 86900 BLOOD TYPING SEROLOGIC ABO: CPT

## 2021-10-31 PROCEDURE — 85018 HEMOGLOBIN: CPT

## 2021-10-31 PROCEDURE — 6360000002 HC RX W HCPCS: Performed by: INTERNAL MEDICINE

## 2021-10-31 PROCEDURE — 2580000003 HC RX 258: Performed by: INTERNAL MEDICINE

## 2021-10-31 PROCEDURE — 85730 THROMBOPLASTIN TIME PARTIAL: CPT

## 2021-10-31 PROCEDURE — 86901 BLOOD TYPING SEROLOGIC RH(D): CPT

## 2021-10-31 PROCEDURE — 86039 ANTINUCLEAR ANTIBODIES (ANA): CPT

## 2021-10-31 PROCEDURE — 2060000000 HC ICU INTERMEDIATE R&B

## 2021-10-31 PROCEDURE — 86923 COMPATIBILITY TEST ELECTRIC: CPT

## 2021-10-31 PROCEDURE — 36415 COLL VENOUS BLD VENIPUNCTURE: CPT

## 2021-10-31 PROCEDURE — 85014 HEMATOCRIT: CPT

## 2021-10-31 PROCEDURE — 6370000000 HC RX 637 (ALT 250 FOR IP): Performed by: INTERNAL MEDICINE

## 2021-10-31 PROCEDURE — P9016 RBC LEUKOCYTES REDUCED: HCPCS

## 2021-10-31 PROCEDURE — 86038 ANTINUCLEAR ANTIBODIES: CPT

## 2021-10-31 PROCEDURE — C9113 INJ PANTOPRAZOLE SODIUM, VIA: HCPCS | Performed by: INTERNAL MEDICINE

## 2021-10-31 PROCEDURE — 99233 SBSQ HOSP IP/OBS HIGH 50: CPT | Performed by: FAMILY MEDICINE

## 2021-10-31 PROCEDURE — 86850 RBC ANTIBODY SCREEN: CPT

## 2021-10-31 PROCEDURE — 86431 RHEUMATOID FACTOR QUANT: CPT

## 2021-10-31 RX ORDER — PREDNISONE 20 MG/1
80 TABLET ORAL DAILY
Status: DISCONTINUED | OUTPATIENT
Start: 2021-10-31 | End: 2021-11-03 | Stop reason: HOSPADM

## 2021-10-31 RX ORDER — SODIUM CHLORIDE 9 MG/ML
INJECTION, SOLUTION INTRAVENOUS PRN
Status: DISCONTINUED | OUTPATIENT
Start: 2021-10-31 | End: 2021-11-03 | Stop reason: HOSPADM

## 2021-10-31 RX ADMIN — PREDNISONE 80 MG: 20 TABLET ORAL at 08:55

## 2021-10-31 RX ADMIN — Medication 10 ML: at 08:57

## 2021-10-31 RX ADMIN — AMOXICILLIN AND CLAVULANATE POTASSIUM 1 TABLET: 500; 125 TABLET, FILM COATED ORAL at 21:29

## 2021-10-31 RX ADMIN — AMOXICILLIN AND CLAVULANATE POTASSIUM 1 TABLET: 500; 125 TABLET, FILM COATED ORAL at 05:23

## 2021-10-31 RX ADMIN — PANTOPRAZOLE SODIUM 40 MG: 40 INJECTION, POWDER, FOR SOLUTION INTRAVENOUS at 12:24

## 2021-10-31 RX ADMIN — AMOXICILLIN AND CLAVULANATE POTASSIUM 1 TABLET: 500; 125 TABLET, FILM COATED ORAL at 17:59

## 2021-10-31 ASSESSMENT — PAIN SCALES - GENERAL
PAINLEVEL_OUTOF10: 0

## 2021-10-31 NOTE — PROGRESS NOTES
[facility] Progress Note    Admitting Date and Time: 10/25/2021  8:59 PM  Admit Dx: Anemia requiring transfusions [D64.9]  Altered mental status, unspecified altered mental status type [R41.82]    Subjective:  Patient is being followed for Anemia requiring transfusions [D64.9]  Altered mental status, unspecified altered mental status type [R41.82]   Pt is eating well. His appetite is improving. Patient required blood transfusion this morning as his hgb dropped below 7. Unclear where blood is lost from. Patient's only complaint is fatigue. ROS: denies fever, chills, cp, sob, n/v, HA unless stated above.       predniSONE  80 mg Oral Daily    sodium chloride flush  5-40 mL IntraVENous 2 times per day    lisinopril  20 mg Oral Daily    And    hydroCHLOROthiazide  12.5 mg Oral Daily    amoxicillin-clavulanate  1 tablet Oral 3 times per day    pantoprazole  40 mg IntraVENous Daily     sodium chloride, , PRN  sodium chloride flush, 5-40 mL, PRN  sodium chloride, 25 mL, PRN  ondansetron, 4 mg, Q8H PRN   Or  ondansetron, 4 mg, Q6H PRN  acetaminophen, 650 mg, Q6H PRN   Or  acetaminophen, 650 mg, Q6H PRN  HYDROcodone-acetaminophen, 1 tablet, Q6H PRN  sodium chloride, , PRN         Objective:    BP (!) 109/56   Pulse 84   Temp 98.1 °F (36.7 °C) (Oral)   Resp 16   Ht 5' 8\" (1.727 m)   Wt 169 lb (76.7 kg)   SpO2 95%   BMI 25.70 kg/m²     General Appearance: alert and oriented to person, place and time and in no acute distress  Skin: warm and dry  Head: normocephalic and atraumatic  Eyes: pupils equal, round, and reactive to light, extraocular eye movements intact, conjunctivae normal  Neck: neck supple and non tender without mass   Pulmonary/Chest: clear to auscultation bilaterally- no wheezes, rales or rhonchi, normal air movement, no respiratory distress  Cardiovascular: normal rate, normal S1 and S2 and no carotid bruits  Abdomen: soft, non-tender, non-distended, normal bowel sounds, no masses or organomegaly  Extremities: no cyanosis, no clubbing and no edema  Neurologic: no cranial nerve deficit and speech normal; DTRs intact; coordination is intact        No results for input(s): NA, K, CL, CO2, BUN, CREATININE, GLUCOSE, CALCIUM in the last 72 hours. Recent Labs     10/28/21  1805 10/29/21  0415 10/30/21  1819 10/31/21  0520 10/31/21  1230   WBC 6.2  --   --   --   --    RBC 2.45*  --   --   --   --    HGB 7.9*   < > 7.0* 6.7* 8.0*   HCT 23.9*   < > 21.2* 20.3* 25.1*   MCV 97.6  --   --   --   --    MCH 32.2  --   --   --   --    MCHC 33.1  --   --   --   --    RDW 13.5  --   --   --   --      --   --   --   --    MPV 11.3  --   --   --   --     < > = values in this interval not displayed. Assessment:    Principal Problem:  1. Hemorrhagic complications of dental implant placement  2. Anemia requiring transfusions-monitor hgb closely and transfuse for hgb <7  3. BPH (benign prostatic hyperplasia)  4. Left pleural effusion vs scarring-pulmonology following  5. Significant ecchymoses-hematology consulted; concern for acquired hemophilia;prolonged PTT  6. Chorea like movements (yet otherwise normal neuro exam)-no neuro coverage; CT heat without bleed;symptoms seems to have resolved; continue to monitor  7. Hemoccult positive-likely secondary to swallowing of blood from dental procedure  8. Lyme disease (dx dec 2018)-was treated with abx and prednisone by PCP        Plan:  1. Pulmonology following  2. PTT elevated >240  3. Hematology consulted for bleeding disorder-recommend that the patient be transferred to tertiary care facility. Transfer initiated. No beds yet available for transfer. [facility] was wanting call from hematology for further information; per hematology, suspected factor 8 or 9 inhibitor; all factor levels still pending as these are a send out. 4.  Repeat CT head negative  5.   Consulted neurology for opinion on movements, however no neuro coverage here until January. 6.  Transfuse for hgb <7  7. Continue PPI and carafate  8. SLP following; recommend pureed solids and honey thickened liquids  9. Continue home meds for hypertension  10. Patient has been off of prednisone during admission;resumed at 80 mg PO daily for presumed factor 8 or 9 inhibitor per hematology  11. Hemoccult stool    If still no bed in [facility] tomorrow, then will try  or [facility]        NOTE: This report was transcribed using voice recognition software. Every effort was made to ensure accuracy; however, inadvertent computerized transcription errors may be present.   Electronically signed by Yessy Henry MD on 10/31/2021 at 1:12 PM

## 2021-10-31 NOTE — PROGRESS NOTES
S: Patient awake, no further oral bleeding, bruising present face and arms, denies any visible GI bleeding     O: Temp 98.4 P-77 R-15 Bp97/51  GEn- alert,lying in bed talking, wife at bedside  HEENT- mmm, bruising right inner cheek and on right side of face  Neck- no adenopathy  Lungs- clear  Cardiac- RRR  abd- soft /nt/nd  Ext- edema right lower leg more than left lower leg        Labs:  PTT today >240 Hb-6.7 hct=20  yesterday Hb=7.4,   VWF antigen 10-29 pending  Factor 8 and factor 9 from 10-29 pending  10-27 JGY=448 , INR=1.1  10-28 fibrinogen >700     A/P:  80year old gentleman diagnosed and treated for lyme's disease 3 years ago , states he was soon after placed on prednisone chronically for joint pains and was taking 14mg po daily at home for the past 3 years. He denies any prior bleeding problems with prior dental extractions and implants and underwent right molar extraction and implant last week complicated by severe ongoing bleeding that led to admission for anemia and discovered to have prolonged PTT   most worrisome for acquired hemophilia/factor 8 or 9 inhibitor. Fortunately, no further oral bleeding yesterday or today.     1. Unfortunately, the lab at this hospital does not perform factor levels, vwf levels and tests sent out 10-29 are still pending and may take a few days to get back . PTT Mixing study was ordered 10-29 and reportedly will not be done until Monday 11-1. He was recommended to be transferred to a [facility] [place] where labs can be done more quickly and products can be given swiftly if needed. He has been accepted at [facility] and is waiting for a bed. 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 2. Nikki Grant spoke with the [facility] transfer team Friday  10-29 and left his cell phone number with them to give to [facility] attending  so that they could call if they have any questions.     3. Daily PTT  4. Rheumatoid factor and AMARI screen yesterday  5.  STarted oral prednisone 1mg/kg =-80mg po daily today for presumed factor 8 or 9 inhibitor, all factor levels still pending. No active bleeding in mouth or visible elsewhere at this time . He is on daily protonix for GI prophylaxis. **Daily PTT. .   * If patient developed any signficant bleeding, I have spoken with inpatient pharmacy here and [facility] is available and we could dose at 50 to 100 unit/kg q 6 to 12 hours depending on severity of hemorrhage. .    6. Transfusion of 1 unit of RBC already ordered for drop in Hb since yesterday  7.  Consider seeing if another tertiary care center like [place] [id number] or [facility] could take the patient today given that [facility] has not had a bed.     Melvin Li MD

## 2021-11-01 LAB
APTT: 141.9 SEC (ref 24.5–35.1)
BASOPHILS ABSOLUTE: 0 E9/L (ref 0–0.2)
BASOPHILS RELATIVE PERCENT: 0 % (ref 0–2)
EOSINOPHILS ABSOLUTE: 0 E9/L (ref 0.05–0.5)
EOSINOPHILS RELATIVE PERCENT: 0 % (ref 0–6)
HCT VFR BLD CALC: 23.3 % (ref 37–54)
HCT VFR BLD CALC: 25.7 % (ref 37–54)
HEMOGLOBIN: 7.9 G/DL (ref 12.5–16.5)
HEMOGLOBIN: 8.4 G/DL (ref 12.5–16.5)
IMMATURE GRANULOCYTES #: 0.1 E9/L
IMMATURE GRANULOCYTES %: 2.8 % (ref 0–5)
LYME, EIA: 0.2 LIV (ref 0–1.2)
LYMPHOCYTES ABSOLUTE: 0.53 E9/L (ref 1.5–4)
LYMPHOCYTES RELATIVE PERCENT: 14.8 % (ref 20–42)
MCH RBC QN AUTO: 31.2 PG (ref 26–35)
MCHC RBC AUTO-ENTMCNC: 32.7 % (ref 32–34.5)
MCV RBC AUTO: 95.5 FL (ref 80–99.9)
MONOCYTES ABSOLUTE: 0.19 E9/L (ref 0.1–0.95)
MONOCYTES RELATIVE PERCENT: 5.3 % (ref 2–12)
NEUTROPHILS ABSOLUTE: 2.77 E9/L (ref 1.8–7.3)
NEUTROPHILS RELATIVE PERCENT: 77.1 % (ref 43–80)
OVALOCYTES: ABNORMAL
PATHOLOGIST REVIEW: NORMAL
PDW BLD-RTO: 14.1 FL (ref 11.5–15)
PLATELET # BLD: 287 E9/L (ref 130–450)
PMV BLD AUTO: 11.2 FL (ref 7–12)
POIKILOCYTES: ABNORMAL
POLYCHROMASIA: ABNORMAL
RBC # BLD: 2.69 E12/L (ref 3.8–5.8)
WBC # BLD: 3.6 E9/L (ref 4.5–11.5)

## 2021-11-01 PROCEDURE — 85014 HEMATOCRIT: CPT

## 2021-11-01 PROCEDURE — 2060000000 HC ICU INTERMEDIATE R&B

## 2021-11-01 PROCEDURE — 85025 COMPLETE CBC W/AUTO DIFF WBC: CPT

## 2021-11-01 PROCEDURE — 85018 HEMOGLOBIN: CPT

## 2021-11-01 PROCEDURE — 6370000000 HC RX 637 (ALT 250 FOR IP): Performed by: FAMILY MEDICINE

## 2021-11-01 PROCEDURE — 99232 SBSQ HOSP IP/OBS MODERATE 35: CPT | Performed by: FAMILY MEDICINE

## 2021-11-01 PROCEDURE — 97535 SELF CARE MNGMENT TRAINING: CPT

## 2021-11-01 PROCEDURE — 85730 THROMBOPLASTIN TIME PARTIAL: CPT

## 2021-11-01 PROCEDURE — C9113 INJ PANTOPRAZOLE SODIUM, VIA: HCPCS | Performed by: INTERNAL MEDICINE

## 2021-11-01 PROCEDURE — 6370000000 HC RX 637 (ALT 250 FOR IP): Performed by: INTERNAL MEDICINE

## 2021-11-01 PROCEDURE — 97530 THERAPEUTIC ACTIVITIES: CPT

## 2021-11-01 PROCEDURE — 2580000003 HC RX 258: Performed by: INTERNAL MEDICINE

## 2021-11-01 PROCEDURE — 6360000002 HC RX W HCPCS: Performed by: INTERNAL MEDICINE

## 2021-11-01 PROCEDURE — 85611 PROTHROMBIN TEST: CPT

## 2021-11-01 PROCEDURE — 36415 COLL VENOUS BLD VENIPUNCTURE: CPT

## 2021-11-01 PROCEDURE — 92526 ORAL FUNCTION THERAPY: CPT

## 2021-11-01 RX ADMIN — PREDNISONE 80 MG: 20 TABLET ORAL at 09:39

## 2021-11-01 RX ADMIN — LISINOPRIL 20 MG: 20 TABLET ORAL at 09:38

## 2021-11-01 RX ADMIN — AMOXICILLIN AND CLAVULANATE POTASSIUM 1 TABLET: 500; 125 TABLET, FILM COATED ORAL at 15:28

## 2021-11-01 RX ADMIN — PETROLATUM: 420 OINTMENT TOPICAL at 21:26

## 2021-11-01 RX ADMIN — AMOXICILLIN AND CLAVULANATE POTASSIUM 1 TABLET: 500; 125 TABLET, FILM COATED ORAL at 21:25

## 2021-11-01 RX ADMIN — PANTOPRAZOLE SODIUM 40 MG: 40 INJECTION, POWDER, FOR SOLUTION INTRAVENOUS at 09:38

## 2021-11-01 RX ADMIN — PETROLATUM: 420 OINTMENT TOPICAL at 12:46

## 2021-11-01 RX ADMIN — AMOXICILLIN AND CLAVULANATE POTASSIUM 1 TABLET: 500; 125 TABLET, FILM COATED ORAL at 04:32

## 2021-11-01 RX ADMIN — Medication 10 ML: at 09:39

## 2021-11-01 RX ADMIN — HYDROCHLOROTHIAZIDE 12.5 MG: 12.5 TABLET ORAL at 09:38

## 2021-11-01 RX ADMIN — Medication 10 ML: at 21:27

## 2021-11-01 ASSESSMENT — PAIN SCALES - GENERAL
PAINLEVEL_OUTOF10: 0

## 2021-11-01 NOTE — PROGRESS NOTES
Occupational Therapy  OT BEDSIDE TREATMENT NOTE      Date:2021  Patient Name: Ceferino Juárez  MRN: [id number]  : 1950  Room: [address] [place], [place] [address]       Evaluating OT: Claudeen Lime, OTR/ZOHRA [id number]       Referring Provider: Wendy Seo MD    Specific Provider Orders/Date: OT eval and treat 10/27/21       Diagnosis: Anemia requiring transfusions [D64.9]  Altered mental status, unspecified altered mental status type [R41.82]      Pertinent Medical History: HTN, lyme disease       Precautions:  Fall Risk,       Assessment of current deficits    [x]? Functional mobility            [x]?ADLs           [x]? Strength                  [x]? Cognition    [x]? Functional transfers          [x]? IADLs         [x]? Safety Awareness   [x]? Endurance    []? Fine Coordination                         [x]? Balance      []? Vision/perception   []? Sensation      []? Gross Motor Coordination             []? ROM           []?  Delirium                   []? Motor Control      OT PLAN OF CARE   OT POC based on physician orders, patient diagnosis and results of clinical assessment     Frequency/Duration 2-4 days/wk for 2 weeks PRN   Specific OT Treatment Interventions to include:   * Instruction/training on adapted ADL techniques and AE recommendations to increase functional independence within precautions       * Training on energy conservation strategies, correct breathing pattern and techniques to improve independence/tolerance for self-care routine  * Functional transfer/mobility training/DME recommendations for increased independence, safety, and fall prevention  * Patient/Family education to increase follow through with safety techniques and functional independence  * Recommendation of environmental modifications for increased safety with functional transfers/mobility and ADLs  * Cognitive retraining/development of therapeutic activities to improve problem solving, judgement, memory, and attention for increased safety/participation in ADL/IADL tasks  * Therapeutic exercise to improve motor endurance, ROM, and functional strength for ADLs/functional transfers  * Therapeutic activities to facilitate/challenge dynamic balance, stand tolerance for increased safety and independence with ADLs           Recommended Adaptive Equipment: TBD      Home Living: Pt lives with wife in 2 story home with 1 KEY, and no rails. Pt's bedroom and bathroom are on the first floor. Bathroom setup: walk-in shower with built in shower seat, grab bars   Equipment owned: none     Prior Level of Function: independent with ADLs , independent with IADLs; functional mobility: with no device  Driving: yes  Occupation: works in [address] [place] [address]     Pain Level: Pt did not report pain during this session. Cognition: Awake and alert. Min cues for safety. Functional Assessment:  AM-PAC Daily Activity Raw Score: 18/24    Initial Eval Status  Date: 10/27/21 Treatment Status  Date:11/1/21 STGs = LTGs  Time frame: 10-14 days   Feeding I       Grooming CGA to for standing balance to stand at sink for hand hygiene SBA for balance while standing to brush teeth and wash face.   SUP   UB Dressing CGA/SBA SBA   SUP   LB Dressing Min A to don socks over heel Pt donned brief and pants with SBA   SUP-with use of AD as appropriate/needed   Bathing Min A   SUP -with use of AD as appropriate/needed   Toileting Min A for thorough hygiene Pt having urinary incontinence. Doffed pajama pants. Donned clean brief. Independent with toilet hygiene.   SUP   Bed Mobility  Supine to sit: Max A  SBA supine to sit          Functional Transfers Mod A with wheeled walker  Sit to stand from EOB  Sit<>stand from commode  Stand to sit to chair [facility] from bed, chair, and toilet surfaces.   SBA    Functional Mobility Min A with wheeled walker to and from bathroom CGA using w/w to and from bathroom.   SBA -with device as needed to maximize independence with ADLs and functional

## 2021-11-01 NOTE — PROGRESS NOTES
P Quality Flow/Interdisciplinary Rounds Progress Note        Quality Flow Rounds held on November 1, 2021    Disciplines Attending:  Bedside Nurse, ,  and Nursing Unit Leadership    Torrence Dancer was admitted on 10/25/2021  8:59 PM    Anticipated Discharge Date:  Expected Discharge Date: 10/31/21    Disposition:    Damion Score:  Damion Scale Score: 20    Readmission Risk              Risk of Unplanned Readmission:  11           Discussed patient goal for the day, patient clinical progression, and barriers to discharge. The following Goal(s) of the Day/Commitment(s) have been identified:  awaiting transfer to [facility].        Adi Khalil RN  November 1, 2021

## 2021-11-01 NOTE — PROGRESS NOTES
Subjective:    Patient is doing well. He has no recurrence or bleeding for at least 24 hours. Ecchymosis over the face and neck is improving. Reports no dizziness or lightheadedness. No chest pain or dyspnea. Objective:    /62   Pulse 90   Temp 98.1 °F (36.7 °C) (Oral)   Resp 18   Ht 5' 8\" (1.727 m)   Wt 169 lb (76.7 kg)   SpO2 97%   BMI 25.70 kg/m²     Patient is alert oriented no apparent distress  He has mild fading ecchymosis over the right face and right neck as well as upper chest.  Mouth: No bleeding mucosa. No thrush.     CBC with Differential:    Lab Results   Component Value Date    WBC 3.6 11/01/2021    RBC 2.69 11/01/2021    HGB 8.4 11/01/2021    HCT 25.7 11/01/2021     11/01/2021    MCV 95.5 11/01/2021    MCH 31.2 11/01/2021    MCHC 32.7 11/01/2021    RDW 14.1 11/01/2021    METASPCT 1.0 02/07/2019    LYMPHOPCT 14.8 11/01/2021    MONOPCT 5.3 11/01/2021    MYELOPCT 3.0 02/07/2019    BASOPCT 0.0 11/01/2021    MONOSABS 0.19 11/01/2021    LYMPHSABS 0.53 11/01/2021    EOSABS 0.00 11/01/2021    BASOSABS 0.00 11/01/2021     CMP:    Lab Results   Component Value Date     10/28/2021    K 3.9 10/28/2021    K 4.2 10/27/2021     10/28/2021    CO2 18 10/28/2021    BUN 45 10/28/2021    CREATININE 1.2 10/28/2021    GFRAA >60 10/28/2021    LABGLOM 60 10/28/2021    GLUCOSE 85 10/28/2021    PROT 5.5 10/28/2021    LABALBU 2.9 10/28/2021    CALCIUM 8.5 10/28/2021    BILITOT 0.7 10/28/2021    ALKPHOS 71 10/28/2021    AST 92 10/28/2021    ALT 40 10/28/2021            Current Facility-Administered Medications:     white petrolatum ointment, , Topical, BID, Clau Cruz MD    0.9 % sodium chloride infusion, , IntraVENous, PRN, Aron Cr DO    predniSONE (DELTASONE) tablet 80 mg, 80 mg, Oral, Daily, Ruth Ann Latif MD, 80 mg at 11/01/21 3786    sodium chloride flush 0.9 % injection 5-40 mL, 5-40 mL, IntraVENous, 2 times per day, Lit Morel MD, 10 mL at 11/01/21 further oral bleeding yesterday or today. Plan:  Patient is doing well. No evidence of active bleeding for more than 24 hours. He is waiting for [facility]. Unfortunately coag studies are not available yet. Patient probably has an acquired bleeding disorder with a inhibitor of factor VIII, IX or even von Willebrand factor. Hemoglobin has been stable for the last 2 days. If there is still no evidence of recurrent bleeding over the next 24 hours and the patient is still here, he can be discharged from the hospital.  He can be seen at the [facility] either Wednesday or Thursday by Dr. Theodore Cazares to review the results of for work-up and recommend treatment plan.       Electronically signed by Nolberto Marquez MD on 11/1/2021 at 12:41 PM

## 2021-11-01 NOTE — PROGRESS NOTES
SPEECH LANGUAGE PATHOLOGY  DAILY PROGRESS NOTE        PATIENT NAME:  Judy Wang      :  1950          TODAY'S DATE:  2021 ROOM:  [address] [place] [address]    SWALLOWING     Pt sitting upright in chair, wife present. Pt and wife continue to report improvement in mentation and swallowing and decrease in involuntary movements. Patient tolerating of oral trials noted. No overt s/s of aspiration observed, however silent aspiration can not be ruled out bedside.         DYSPHAGIA DIAGNOSIS:   Clinical indicators of normal swallow function, pt did recently have implants placed and was encouraged to continue to consume soft foods and chew on left side                             DIET RECOMMENDATIONS:  Soft and bite size with thin liquids as tolerated                    FEEDING RECOMMENDATIONS:                           Assistance level:  no assistance needed                             Compensatory strategies recommended: Small bites/sips, Alternate solids and liquids, chew on left side                                                    Oral- adequate labial seal and A-P transfer, no oral residue                             Pharyngeal- no overt s/s of aspiration, no multiple swallows                             Education- Pt educated on results and POC. Pt trained on compensatory strategies for safe swallow with good outcome. Questions answered.                                                                                          Will continue SP intervention as per previously established POC. Vanessa Leavitt M.A., CCC-SLP  [place]. [id number]  2021            CPT code(s) 03673  dysphagia tx  Total minutes :  30 minutes

## 2021-11-01 NOTE — PROGRESS NOTES
[facility] Progress Note    Admitting Date and Time: 10/25/2021  8:59 PM  Admit Dx: Anemia requiring transfusions [D64.9]  Altered mental status, unspecified altered mental status type [R41.82]    Subjective:  Patient is being followed for Anemia requiring transfusions [D64.9]  Altered mental status, unspecified altered mental status type [R41.82]   Pt is eating well. His appetite is improving. Patient required blood transfusion on 10/31/21, as his hgb dropped below 7. Unclear where blood is lost from. Patient's only complaint is fatigue. Improvement in the PTT today. Hematology still following the patient. ROS: denies fever, chills, cp, sob, n/v, HA unless stated above.       white petrolatum   Topical BID    predniSONE  80 mg Oral Daily    sodium chloride flush  5-40 mL IntraVENous 2 times per day    lisinopril  20 mg Oral Daily    And    hydroCHLOROthiazide  12.5 mg Oral Daily    amoxicillin-clavulanate  1 tablet Oral 3 times per day    pantoprazole  40 mg IntraVENous Daily     sodium chloride, , PRN  sodium chloride flush, 5-40 mL, PRN  sodium chloride, 25 mL, PRN  ondansetron, 4 mg, Q8H PRN   Or  ondansetron, 4 mg, Q6H PRN  acetaminophen, 650 mg, Q6H PRN   Or  acetaminophen, 650 mg, Q6H PRN  HYDROcodone-acetaminophen, 1 tablet, Q6H PRN         Objective:    /62   Pulse 90   Temp 98.1 °F (36.7 °C) (Oral)   Resp 18   Ht 5' 8\" (1.727 m)   Wt 169 lb (76.7 kg)   SpO2 97%   BMI 25.70 kg/m²     General Appearance: alert and oriented to person, place and time and in no acute distress  Skin: warm and dry  Head: normocephalic and atraumatic  Eyes: pupils equal, round, and reactive to light, extraocular eye movements intact, conjunctivae normal  Neck: neck supple and non tender without mass   Pulmonary/Chest: clear to auscultation bilaterally- no wheezes, rales or rhonchi, normal air movement, no respiratory distress  Cardiovascular: normal rate, normal S1 and S2 and no carotid bruits  Abdomen: soft, non-tender, non-distended, normal bowel sounds, no masses or organomegaly  Extremities: no cyanosis, no clubbing and no edema  Neurologic: no cranial nerve deficit and speech normal; DTRs intact; coordination is intact        No results for input(s): NA, K, CL, CO2, BUN, CREATININE, GLUCOSE, CALCIUM in the last 72 hours. Recent Labs     10/31/21  0520 10/31/21  1230 11/01/21  0420   WBC  --   --  3.6*   RBC  --   --  2.69*   HGB 6.7* 8.0* 8.4*   HCT 20.3* 25.1* 25.7*   MCV  --   --  95.5   MCH  --   --  31.2   MCHC  --   --  32.7   RDW  --   --  14.1   PLT  --   --  287   MPV  --   --  11.2       Assessment:    Principal Problem:  1. Hemorrhagic complications of dental implant placement  2. Anemia requiring transfusions-monitor hgb closely and transfuse for hgb <7  3. BPH (benign prostatic hyperplasia)  4. Left pleural effusion vs scarring-pulmonology following  5. Significant ecchymoses-hematology consulted; concern for acquired hemophilia;prolonged PTT  6. Chorea like movements (yet otherwise normal neuro exam)-no neuro coverage; CT heat without bleed;symptoms seems to have resolved; continue to monitor  7. Hemoccult positive-likely secondary to swallowing of blood from dental procedure  8. Lyme disease (dx dec 2018)-was treated with abx and prednisone by PCP        Plan:  1. Pulmonology following  2. PTT elevated 141.9  3. Hematology consulted for bleeding disorder-recommend that the patient be transferred to tertiary care facility. Transfer initiated. No beds yet available for transfer. Patient likely has factor 8,9 or von willebrand deficiency  4. Repeat CT head negative  5. Consulted neurology for opinion on movements, however no neuro coverage here until January; movements have gone away. 6.  Transfuse for hgb <7  7. Continue PPI and carafate  8. Continue home meds for hypertension  9.   Patient now on prednisone at 80 mg PO daily for presumed factor 8 or 9 inhibitor per hematology; appreciate their input on home going dose of prednisone. 10.  AMPAC 18/24  11. Hemoccult stool was negative on 10/31/21    If patient's hgb and BP remains stable through today, can discharge home tomorrow with close hematology follow up        NOTE: This report was transcribed using voice recognition software. Every effort was made to ensure accuracy; however, inadvertent computerized transcription errors may be present.   Electronically signed by Renu Wu MD on 11/1/2021 at 2:25 PM

## 2021-11-01 NOTE — FLOWSHEET NOTE
Initial Inpatient Wound Care    Admit Date: 10/25/2021  8:59 PM    Reason for consult:  cm x 1cm coccyx wound right upper skin fold near sacrum  Significant history:  Adm with anemia  On steroid, Treatment 2 years ago for lyme disease  Recent dental implant surgery- bleeding from area  Lethargy, dark stools, loose frequent      Findings:    11/01/21 1144   Wound 10/31/21 Buttocks Left; Inner   Date First Assessed/Time First Assessed: 10/31/21 2322   Present on Hospital Admission: No  Primary Wound Type: Pressure Injury  Location: Buttocks  Wound Location Orientation: Left; Inner   Wound Image    Wound Length (cm) 1.5 cm   Wound Width (cm) 1 cm   Wound Depth (cm)   (obs)   Wound Surface Area (cm^2) 1.5 cm^2   Change in Wound Size % (l*w) -33.33   Wound Assessment   (ligh brown covering)   Drainage Amount None   Glendy-wound Assessment   (red/rash)   bruising right cheek bone and face  Impression:  IAD,chronic friction      Plan:  Parvez 85, IVAN - CNS 11/1/2021 11:45 AM

## 2021-11-01 NOTE — PROGRESS NOTES
Physical Therapy    Facility/Department: [address] INTERMEDIATE  Treatment note    NAME: Juan Maldonado  : 1950  MRN: [id number]    Date of Service: 2021               Patient Diagnosis(es): The primary encounter diagnosis was Anemia requiring transfusions. A diagnosis of Altered mental status, unspecified altered mental status type was also pertinent to this visit. has a past medical history of Hypertension and Lyme disease. has a past surgical history that includes other surgical history. Evaluating Therapist: Amanda Poon PT     Referring Provider:  Blanca Rios MD    PT order : PT eval and treat     Room #:  629   DIAGNOSIS: anemia , AMS   PRECAUTIONS: falls      Social:  Pt lives with  Wife  in a  1-1/2  floor plan  With first floor set up, 1  step to enter. 1 step into sunken living room   Prior to admission pt walked with no AD, independent, works      Initial Evaluation  Date:  10/27/2021  Treatment  21    Short Term/ Long Term   Goals   Was pt agreeable to Eval/treatment? yes  yes    Does pt have pain?  mouth, butt  No complaints    Bed Mobility  Rolling: NT   Supine to sit:  Max assist   Sit to supine:  NT   Scooting:   Mod assist in sit  Rolling: NT  Supine to sit: NT  Scooting: NT SBA    Transfers Sit to stand:  Min/mod assist   Stand to sit:  Min assist   Stand pivot:  NT Sit <> stand SBA  SBA    Ambulation     15 and 45  feet with  ww  with  CGA/min assist . 10 feet with no AD mod assist  210 feet x 1 using WW for support SBA for balance  150  feet with  AAD  with  SBA        Stair negotiation: ascended and descended NT  NT  1-2  steps with  B  rail with  SBA   LE ROM  WFL     LE strength  3+ to 4-/ 5   4/5    AM- PAC RAW score   15/ 24  18/24            Pt is alert and able to follow instruction  Balance: fair dynamic using WW for support    Pt performed therapeutic exercise of the following: NT    Patient education  Pt was educated on UE usage to assist with transfers    Patient response to education:   Pt verbalized understanding Pt demonstrated skill Pt requires further education in this area   yes With instruction yes     ASSESSMENT:   Comments: Nurse ok with Rx. Pt found in a bedside chair, agreed to rx. Gait performed to the hallway,  sheldon slow and consistent, no loss of balance or shortness of breath noted. Pt fatigued after activity, states this is the second time he has walked the hallway today, states already walked in the hallway with therapy. (OT). Wife present, states waiting on a room at the [facility] but states Pt strong enough to go home instead of a rehab setting. Discussed Pt progress and in agreement with her. Treatment: Pt practiced and was instructed in the following treatment: transfer safety    Pt was left in a bedside chair as found with call light in reach. Time in 0952  Time out 1006   Total Treatment Time 14 minutes   CPT codes:     Therapeutic activities 73224 14 minutes   Therapeutic exercises 19926 0 minutes       Pt is making good progress toward established Physical Therapy goals. Continue with physical therapy current plan of care.     Irene Leader [facility]   License Number: [id number]

## 2021-11-02 ENCOUNTER — APPOINTMENT (OUTPATIENT)
Dept: NUCLEAR MEDICINE | Age: 71
DRG: 813 | End: 2021-11-02
Payer: MEDICARE

## 2021-11-02 LAB
ANA PATTERN: ABNORMAL
ANA TITER: ABNORMAL
ANTI-NUCLEAR ANTIBODY (ANA): POSITIVE
APTT: >240 SEC (ref 24.5–35.1)
BASOPHILS ABSOLUTE: 0 E9/L (ref 0–0.2)
BASOPHILS RELATIVE PERCENT: 0 % (ref 0–2)
BURR CELLS: ABNORMAL
EOSINOPHILS ABSOLUTE: 0 E9/L (ref 0.05–0.5)
EOSINOPHILS RELATIVE PERCENT: 0 % (ref 0–6)
FACTOR X ACTIVITY: 83 % (ref 81–157)
FACTOR XI ACTIVITY: NORMAL % (ref 56–153)
HCT VFR BLD CALC: 21.3 % (ref 37–54)
HEMOGLOBIN: 7.1 G/DL (ref 12.5–16.5)
IMMATURE GRANULOCYTES #: 0.07 E9/L
IMMATURE GRANULOCYTES %: 1.5 % (ref 0–5)
LYMPHOCYTES ABSOLUTE: 0.47 E9/L (ref 1.5–4)
LYMPHOCYTES RELATIVE PERCENT: 10.2 % (ref 20–42)
MCH RBC QN AUTO: 31.8 PG (ref 26–35)
MCHC RBC AUTO-ENTMCNC: 33.3 % (ref 32–34.5)
MCV RBC AUTO: 95.5 FL (ref 80–99.9)
MIXING STUDY: NORMAL
MONOCYTES ABSOLUTE: 0.42 E9/L (ref 0.1–0.95)
MONOCYTES RELATIVE PERCENT: 9.2 % (ref 2–12)
NEUTROPHILS ABSOLUTE: 3.63 E9/L (ref 1.8–7.3)
NEUTROPHILS RELATIVE PERCENT: 79.1 % (ref 43–80)
PDW BLD-RTO: 14.6 FL (ref 11.5–15)
PLATELET # BLD: 276 E9/L (ref 130–450)
PMV BLD AUTO: 11.9 FL (ref 7–12)
POIKILOCYTES: ABNORMAL
POLYCHROMASIA: ABNORMAL
RBC # BLD: 2.23 E12/L (ref 3.8–5.8)
VON WILLEBRAND ACTIVITY RCF: 503 % (ref 51–215)
VON WILLEBRAND AG: 407 % (ref 52–214)
WBC # BLD: 4.6 E9/L (ref 4.5–11.5)

## 2021-11-02 PROCEDURE — 85025 COMPLETE CBC W/AUTO DIFF WBC: CPT

## 2021-11-02 PROCEDURE — 85730 THROMBOPLASTIN TIME PARTIAL: CPT

## 2021-11-02 PROCEDURE — 6360000002 HC RX W HCPCS: Performed by: INTERNAL MEDICINE

## 2021-11-02 PROCEDURE — 1200000000 HC SEMI PRIVATE

## 2021-11-02 PROCEDURE — 3430000000 HC RX DIAGNOSTIC RADIOPHARMACEUTICAL: Performed by: RADIOLOGY

## 2021-11-02 PROCEDURE — 36415 COLL VENOUS BLD VENIPUNCTURE: CPT

## 2021-11-02 PROCEDURE — A9560 TC99M LABELED RBC: HCPCS | Performed by: RADIOLOGY

## 2021-11-02 PROCEDURE — 92526 ORAL FUNCTION THERAPY: CPT | Performed by: SPEECH-LANGUAGE PATHOLOGIST

## 2021-11-02 PROCEDURE — 99232 SBSQ HOSP IP/OBS MODERATE 35: CPT | Performed by: FAMILY MEDICINE

## 2021-11-02 PROCEDURE — C9113 INJ PANTOPRAZOLE SODIUM, VIA: HCPCS | Performed by: INTERNAL MEDICINE

## 2021-11-02 PROCEDURE — 6370000000 HC RX 637 (ALT 250 FOR IP): Performed by: INTERNAL MEDICINE

## 2021-11-02 PROCEDURE — 6370000000 HC RX 637 (ALT 250 FOR IP): Performed by: FAMILY MEDICINE

## 2021-11-02 PROCEDURE — 78278 ACUTE GI BLOOD LOSS IMAGING: CPT

## 2021-11-02 PROCEDURE — 2580000003 HC RX 258: Performed by: INTERNAL MEDICINE

## 2021-11-02 RX ADMIN — Medication 10 ML: at 19:41

## 2021-11-02 RX ADMIN — PANTOPRAZOLE SODIUM 40 MG: 40 INJECTION, POWDER, FOR SOLUTION INTRAVENOUS at 09:35

## 2021-11-02 RX ADMIN — LISINOPRIL 20 MG: 20 TABLET ORAL at 09:35

## 2021-11-02 RX ADMIN — Medication 25 MILLICURIE: at 14:22

## 2021-11-02 RX ADMIN — AMOXICILLIN AND CLAVULANATE POTASSIUM 1 TABLET: 500; 125 TABLET, FILM COATED ORAL at 19:41

## 2021-11-02 RX ADMIN — AMOXICILLIN AND CLAVULANATE POTASSIUM 1 TABLET: 500; 125 TABLET, FILM COATED ORAL at 13:41

## 2021-11-02 RX ADMIN — PETROLATUM: 420 OINTMENT TOPICAL at 09:46

## 2021-11-02 RX ADMIN — AMOXICILLIN AND CLAVULANATE POTASSIUM 1 TABLET: 500; 125 TABLET, FILM COATED ORAL at 05:45

## 2021-11-02 RX ADMIN — PREDNISONE 80 MG: 20 TABLET ORAL at 09:35

## 2021-11-02 RX ADMIN — PETROLATUM: 420 OINTMENT TOPICAL at 19:43

## 2021-11-02 RX ADMIN — Medication 10 ML: at 09:35

## 2021-11-02 RX ADMIN — HYDROCHLOROTHIAZIDE 12.5 MG: 12.5 TABLET ORAL at 09:35

## 2021-11-02 ASSESSMENT — PAIN SCALES - GENERAL
PAINLEVEL_OUTOF10: 0

## 2021-11-02 NOTE — PROGRESS NOTES
SPEECH LANGUAGE PATHOLOGY  DAILY PROGRESS NOTE        PATIENT NAME:  Catracho Ocampo      :  1950          TODAY'S DATE:  2021 ROOM:  [address] [place] [address]    SWALLOWING     Pt sitting upright in chair. Pt  continues to report improvement in mentation and swallowing and decrease in involuntary movements. Patient toleration of thin liquid oral trials noted. No overt s/s of aspiration observed, however silent aspiration can not be ruled out bedside.         DYSPHAGIA DIAGNOSIS:   Clinical indicators of normal swallow function, pt did recently have implants placed and was encouraged to continue to consume soft foods and chew on left side                             DIET RECOMMENDATIONS:  Soft and bite size with thin liquids as tolerated                    FEEDING RECOMMENDATIONS:                           Assistance level:  no assistance needed                             Compensatory strategies recommended: Small bites/sips, Alternate solids and liquids, chew on left side                                                    Oral- adequate labial seal and A-P transfer, no oral residue                             Pharyngeal- no overt s/s of aspiration, no multiple swallows                             Education- Pt educated on results and POC. Pt trained on compensatory strategies for safe swallow with good outcome. Questions answered.                                                                                          D/C from ST due to goals met. Reconsult as warranted. Neetu YANEZ CCC/SLP [id number]  Speech-Language Pathologist         CPT code(s) 55257  dysphagia tx  Total minutes :  15 minutes

## 2021-11-02 NOTE — PROGRESS NOTES
and fatigue causing anemia) as evidenced by intake 51-75%, poor intake prior to admission, poor dentition      Nutrition Interventions:   Food and/or Nutrient Delivery:  Continue Current Diet, Start Oral Nutrition Supplement (Ensure Enlive BID)  Nutrition Education/Counseling:  No recommendation at this time   Coordination of Nutrition Care:  Continue to monitor while inpatient    Goals:  >75% of meals and ONS consumed       Nutrition Monitoring and Evaluation:   Behavioral-Environmental Outcomes:  None Identified   Food/Nutrient Intake Outcomes:  Food and Nutrient Intake, Supplement Intake  Physical Signs/Symptoms Outcomes:  Biochemical Data, Chewing or Swallowing, Fluid Status or Edema, Nutrition Focused Physical Findings, Skin, Weight, Meal Time Behavior     Discharge Planning:     Too soon to determine     Electronically signed by Ivanna Mccann RD, LD on 11/2/21 at 2:16 PM EDT    Contact: [phone]

## 2021-11-02 NOTE — CARE COORDINATION
Met with patient- discussed plan of care and safe discharge planning. [facility] score 18/24- ambulated 210 ft with ww. Educated patient on the importance of a safe discharge plan. Pt confirmed that at this point discharge plan is for him to return HOME with he wife. Pt confirmed that he has a walker at home to use if needed. Pt denies any [facility] needs noting that he plans to continue working once he is discharged.

## 2021-11-02 NOTE — PROGRESS NOTES
P Quality Flow/Interdisciplinary Rounds Progress Note        Quality Flow Rounds held on November 2, 2021    Disciplines Attending:  Bedside Nurse, ,  and Nursing Unit Leadership    Maximo Canavan was admitted on 10/25/2021  8:59 PM    Anticipated Discharge Date:  Expected Discharge Date: 10/31/21    Disposition:    Damion Score:  Damion Scale Score: 20    Readmission Risk              Risk of Unplanned Readmission:  12           Discussed patient goal for the day, patient clinical progression, and barriers to discharge. The following Goal(s) of the Day/Commitment(s) have been identified:  await transfer to [facility].        Lawrence Guzmán RN  November 2, 2021

## 2021-11-03 VITALS
DIASTOLIC BLOOD PRESSURE: 63 MMHG | BODY MASS INDEX: 25.37 KG/M2 | OXYGEN SATURATION: 99 % | HEIGHT: 68 IN | RESPIRATION RATE: 18 BRPM | WEIGHT: 167.4 LBS | SYSTOLIC BLOOD PRESSURE: 137 MMHG | HEART RATE: 61 BPM | TEMPERATURE: 97.6 F

## 2021-11-03 LAB
APTT: 130.1 SEC (ref 24.5–35.1)
BASOPHILS ABSOLUTE: 0 E9/L (ref 0–0.2)
BASOPHILS RELATIVE PERCENT: 0 % (ref 0–2)
EOSINOPHILS ABSOLUTE: 0 E9/L (ref 0.05–0.5)
EOSINOPHILS RELATIVE PERCENT: 0 % (ref 0–6)
FACTOR IX ACTIVITY: 70 % (ref 65–150)
FACTOR VII ACTIVITY: 92 % (ref 50–150)
FACTOR VIII ACTIVITY: <10 % (ref 50–150)
HCT VFR BLD CALC: 23.2 % (ref 37–54)
HCT VFR BLD CALC: 23.2 % (ref 37–54)
HCT VFR BLD CALC: 28.2 % (ref 37–54)
HEMOGLOBIN: 7.5 G/DL (ref 12.5–16.5)
HEMOGLOBIN: 7.5 G/DL (ref 12.5–16.5)
HEMOGLOBIN: 9.1 G/DL (ref 12.5–16.5)
IMMATURE GRANULOCYTES #: 0.15 E9/L
IMMATURE GRANULOCYTES %: 2.7 % (ref 0–5)
LYMPHOCYTES ABSOLUTE: 0.77 E9/L (ref 1.5–4)
LYMPHOCYTES RELATIVE PERCENT: 13.7 % (ref 20–42)
MCH RBC QN AUTO: 31.4 PG (ref 26–35)
MCHC RBC AUTO-ENTMCNC: 32.3 % (ref 32–34.5)
MCV RBC AUTO: 97.1 FL (ref 80–99.9)
MONOCYTES ABSOLUTE: 0.53 E9/L (ref 0.1–0.95)
MONOCYTES RELATIVE PERCENT: 9.4 % (ref 2–12)
NEUTROPHILS ABSOLUTE: 4.18 E9/L (ref 1.8–7.3)
NEUTROPHILS RELATIVE PERCENT: 74.2 % (ref 43–80)
PDW BLD-RTO: 14.6 FL (ref 11.5–15)
PLATELET # BLD: 331 E9/L (ref 130–450)
PMV BLD AUTO: 11.7 FL (ref 7–12)
RBC # BLD: 2.39 E12/L (ref 3.8–5.8)
WBC # BLD: 5.6 E9/L (ref 4.5–11.5)

## 2021-11-03 PROCEDURE — 99239 HOSP IP/OBS DSCHRG MGMT >30: CPT | Performed by: FAMILY MEDICINE

## 2021-11-03 PROCEDURE — 6370000000 HC RX 637 (ALT 250 FOR IP): Performed by: INTERNAL MEDICINE

## 2021-11-03 PROCEDURE — 85025 COMPLETE CBC W/AUTO DIFF WBC: CPT

## 2021-11-03 PROCEDURE — 85014 HEMATOCRIT: CPT

## 2021-11-03 PROCEDURE — 6360000002 HC RX W HCPCS: Performed by: INTERNAL MEDICINE

## 2021-11-03 PROCEDURE — 6370000000 HC RX 637 (ALT 250 FOR IP): Performed by: FAMILY MEDICINE

## 2021-11-03 PROCEDURE — 36415 COLL VENOUS BLD VENIPUNCTURE: CPT

## 2021-11-03 PROCEDURE — C9113 INJ PANTOPRAZOLE SODIUM, VIA: HCPCS | Performed by: INTERNAL MEDICINE

## 2021-11-03 PROCEDURE — 2580000003 HC RX 258: Performed by: INTERNAL MEDICINE

## 2021-11-03 PROCEDURE — 85730 THROMBOPLASTIN TIME PARTIAL: CPT

## 2021-11-03 PROCEDURE — 85018 HEMOGLOBIN: CPT

## 2021-11-03 RX ORDER — LANOLIN ALCOHOL/MO/W.PET/CERES
325 CREAM (GRAM) TOPICAL
Qty: 90 TABLET | Refills: 0 | Status: SHIPPED | OUTPATIENT
Start: 2021-11-03 | End: 2022-01-25

## 2021-11-03 RX ORDER — PREDNISONE 20 MG/1
80 TABLET ORAL DAILY
Qty: 40 TABLET | Refills: 0 | Status: SHIPPED | OUTPATIENT
Start: 2021-11-04 | End: 2021-11-14

## 2021-11-03 RX ORDER — PANTOPRAZOLE SODIUM 40 MG/1
40 TABLET, DELAYED RELEASE ORAL
Qty: 90 TABLET | Refills: 0 | Status: SHIPPED | OUTPATIENT
Start: 2021-11-03 | End: 2022-01-25

## 2021-11-03 RX ADMIN — PETROLATUM: 420 OINTMENT TOPICAL at 10:21

## 2021-11-03 RX ADMIN — AMOXICILLIN AND CLAVULANATE POTASSIUM 1 TABLET: 500; 125 TABLET, FILM COATED ORAL at 13:04

## 2021-11-03 RX ADMIN — Medication 10 ML: at 10:22

## 2021-11-03 RX ADMIN — PREDNISONE 80 MG: 20 TABLET ORAL at 10:19

## 2021-11-03 RX ADMIN — AMOXICILLIN AND CLAVULANATE POTASSIUM 1 TABLET: 500; 125 TABLET, FILM COATED ORAL at 06:24

## 2021-11-03 RX ADMIN — PANTOPRAZOLE SODIUM 40 MG: 40 INJECTION, POWDER, FOR SOLUTION INTRAVENOUS at 10:20

## 2021-11-03 RX ADMIN — LISINOPRIL 20 MG: 20 TABLET ORAL at 10:20

## 2021-11-03 RX ADMIN — HYDROCHLOROTHIAZIDE 12.5 MG: 12.5 TABLET ORAL at 10:19

## 2021-11-03 ASSESSMENT — PAIN SCALES - GENERAL
PAINLEVEL_OUTOF10: 0
PAINLEVEL_OUTOF10: 0

## 2021-11-03 NOTE — PROGRESS NOTES
Progress Note    Subjective:  Patient is doing better. He is not had any bleeding. The purpura in the mouth are resolving. Objective:    /63   Pulse 61   Temp 97.6 °F (36.4 °C) (Oral)   Resp 18   Ht 5' 8\" (1.727 m)   Wt 167 lb 6.4 oz (75.9 kg)   SpO2 99%   BMI 25.45 kg/m²     General: Pleasant gentleman awake alert oriented x3. No apparent distress. HEENT: Resolving purpura bilateral buccal mucosa. Tongue is clear. No mucositis or thrush. Neck is supple. Heart:  RRR, no murmurs, gallops, or rubs. Lungs:  CTA bilaterally, no wheeze, rales or rhonchi  Abd: bowel sounds present, nontender, nondistended, no masses  Extrem:  No clubbing, cyanosis, or edema  CBC:   Lab Results   Component Value Date    WBC 5.6 11/03/2021    RBC 2.39 11/03/2021    HGB 9.1 11/03/2021    HCT 28.2 11/03/2021    MCV 97.1 11/03/2021    MCH 31.4 11/03/2021    MCHC 32.3 11/03/2021    RDW 14.6 11/03/2021     11/03/2021    MPV 11.7 11/03/2021     CMP:    Lab Results   Component Value Date     10/28/2021    K 3.9 10/28/2021    K 4.2 10/27/2021     10/28/2021    CO2 18 10/28/2021    BUN 45 10/28/2021    CREATININE 1.2 10/28/2021    GFRAA >60 10/28/2021    LABGLOM 60 10/28/2021    GLUCOSE 85 10/28/2021    PROT 5.5 10/28/2021    LABALBU 2.9 10/28/2021    CALCIUM 8.5 10/28/2021    BILITOT 0.7 10/28/2021    ALKPHOS 71 10/28/2021    AST 92 10/28/2021    ALT 40 10/28/2021            NM GI BLOOD LOSS   Final Result   No evidence of active GI bleeding during acquisition. RECOMMENDATIONS:   If the patient shows hemodynamic signs of an active bleed in the next 20   hours, additional images can be acquired. CT HEAD WO CONTRAST   Final Result   No acute intracranial process is identified. CT ABDOMEN PELVIS WO CONTRAST Additional Contrast? None   Final Result   Nonobstructive stones in the right kidney measuring less than 3 mm. No   obstructive uropathy.   Bilateral perinephric stranding, nonspecific  and which may be chronic. Small partially loculated left pleural effusion. Chronic interstitial and   pleuroparenchymal scarring in the lung bases. Sigmoid diverticulosis with no evidence of diverticulitis. CT head without contrast   Final Result   No acute intracranial abnormality. XR CHEST PORTABLE   Final Result   Atelectasis versus infiltrate at left lung base.                Current Facility-Administered Medications:     white petrolatum ointment, , Topical, BID, Lubna Steve MD, Given at 11/03/21 1021    0.9 % sodium chloride infusion, , IntraVENous, PRN, Manuela Dye DO    predniSONE (DELTASONE) tablet 80 mg, 80 mg, Oral, Daily, Dago Latif MD, 80 mg at 11/03/21 1019    sodium chloride flush 0.9 % injection 5-40 mL, 5-40 mL, IntraVENous, 2 times per day, Bry Loera MD, 10 mL at 11/03/21 1022    sodium chloride flush 0.9 % injection 5-40 mL, 5-40 mL, IntraVENous, PRN, Bry Loera MD    0.9 % sodium chloride infusion, 25 mL, IntraVENous, PRN, Bry Loera MD    ondansetron (ZOFRAN-ODT) disintegrating tablet 4 mg, 4 mg, Oral, Q8H PRN **OR** ondansetron (ZOFRAN) injection 4 mg, 4 mg, IntraVENous, Q6H PRN, Bry Loera MD    acetaminophen (TYLENOL) tablet 650 mg, 650 mg, Oral, Q6H PRN, 650 mg at 10/28/21 2041 **OR** acetaminophen (TYLENOL) suppository 650 mg, 650 mg, Rectal, Q6H PRN, Bry Loera MD    HYDROcodone-acetaminophen [facility])  MG per tablet 1 tablet, 1 tablet, Oral, Q6H PRN, Bry Loera MD    lisinopril (PRINIVIL;ZESTRIL) tablet 20 mg, 20 mg, Oral, Daily, 20 mg at 11/03/21 1020 **AND** hydroCHLOROthiazide (HYDRODIURIL) tablet 12.5 mg, 12.5 mg, Oral, Daily, Lubna Steve MD, 12.5 mg at 11/03/21 1019    pantoprazole (PROTONIX) injection 40 mg, 40 mg, IntraVENous, Daily, Bry Loera MD, 40 mg at 11/03/21 1020    Assessment:    Principal Problem:    Hemorrhagic complications of dental implant placement  Active Problems: Anemia requiring transfusions    BPH (benign prostatic hyperplasia)    Aspiration pneumonia (HCC)  Resolved Problems:    * No resolved hospital problems. *      79 yo M with prior hx of Lyme disease on chronic prednisone 14 mg daily. Had recent dental extraction and implant compicated by current bleeding issues, elevated PTT and anemia secondary to acute blood loss. Also aspiration PNA. 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 Since starting prednisone 80 mg p.o. daily he has had decrease in the PTT. No inhibitor titer back yet. Plan:  Continue prednisone 80 mg p.o. daily. Continue on Protonix daily. No significant bleeding at this time. Monitor PTT and CBC w diff daily daily  He has an appointment November 10, 2021at the [facility].     Electronically signed by Rao Mtz MD on 11/3/2021 at 6:12 PM

## 2021-11-03 NOTE — DISCHARGE SUMMARY
[facility] Physician Discharge Summary       No follow-up provider specified. Activity level: As tolerated     Dispo: Home      Condition on discharge: Stable     Patient ID:  Vanna [place]  [id number]  80 y.o.  1950    Admit date: 10/25/2021    Discharge date and time:  11/3/2021  2:58 PM    Admission Diagnoses: Principal Problem:    Hemorrhagic complications of dental implant placement  Active Problems:    Anemia requiring transfusions    BPH (benign prostatic hyperplasia)    Aspiration pneumonia ([address]  Resolved Problems:    * No resolved hospital problems. *      Discharge Diagnoses: Principal Problem:    Hemorrhagic complications of dental implant placement  Active Problems:    Anemia requiring transfusions    BPH (benign prostatic hyperplasia)    Aspiration pneumonia (HCC)  Resolved Problems:    * No resolved hospital problems. *      Consults:  IP CONSULT TO GENERAL SURGERY  IP CONSULT TO SOCIAL WORK  IP CONSULT TO PULMONOLOGY  IP CONSULT TO SOCIAL WORK  IP CONSULT TO IV TEAM  IP CONSULT TO HEM/ONC  IP CONSULT TO [id number] [place] [address] [address]:   Patient Vanan [place] is a 80 y.o. presented with Anemia requiring transfusions [D64.9]  Altered mental status, unspecified altered mental status type [R41.82]  This patientcv -  11is a 28-year-old white male with a past medical history significant for Lyme disease that was treated by his PCP. He was brought to the emergency department due to significant bleeding from his dental implant sites. He required blood transfusions to to significant acute blood loss anemia. It was determined that he had a very high PTT and required consultation with hematology. 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 Unfortunately, the labs were all send out, and were not all returned prior to the patient's discharge.   The patient received a total of 8 units of packed red --  95.5  --  97.1   MCH 31.2  --  31.8  --  31.4   MCHC 32.7  --  33.3  --  32.3   RDW 14.1  --  14.6  --  14.6     --  276  --  331   MPV 11.2  --  11.9  --  11.7    < > = values in this interval not displayed. No results for input(s): POCGLU in the last 72 hours. Imaging:  CT ABDOMEN PELVIS WO CONTRAST Additional Contrast? None    Result Date: 10/26/2021  EXAMINATION: CT OF THE ABDOMEN AND PELVIS WITHOUT CONTRAST 10/25/2021 10:45 pm TECHNIQUE: CT of the abdomen and pelvis was performed without the administration of intravenous contrast. Multiplanar reformatted images are provided for review. Dose modulation, iterative reconstruction, and/or weight based adjustment of the mA/kV was utilized to reduce the radiation dose to as low as reasonably achievable. COMPARISON: None. HISTORY: ORDERING SYSTEM PROVIDED HISTORY: GI bleed TECHNOLOGIST PROVIDED HISTORY: Reason for exam:->GI bleed Additional Contrast?->None FINDINGS: Lower Chest: Calcified granuloma in the right lower lobe. Chronic interstitial changes and pleuroparenchymal scarring in the lung bases. Small partially loculated left pleural effusion. Organs: Liver and spleen are within normal limits. Adrenals and pancreas within normal limits. Gallbladder within normal limits. Nonobstructive stones in the right kidney measuring less than 3 mm. No obstructive uropathy. Bilateral perinephric stranding, nonspecific and which may be chronic. GI/Bowel: No evidence of bowel obstruction or free intraperitoneal air. No colitis or diverticulitis. Normal appendix. Sigmoid diverticulosis. Pelvis: Urinary bladder within normal limits. Peritoneum/Retroperitoneum: No abdominal aortic aneurysm or retroperitoneal adenopathy. Bones/Soft Tissues: No evidence of acute bony pathology. Nonobstructive stones in the right kidney measuring less than 3 mm. No obstructive uropathy. Bilateral perinephric stranding, nonspecific  and which may be chronic.  Small partially loculated left pleural effusion. Chronic interstitial and pleuroparenchymal scarring in the lung bases. Sigmoid diverticulosis with no evidence of diverticulitis. CT head without contrast    Result Date: 10/25/2021  EXAMINATION: CT OF THE HEAD WITHOUT CONTRAST  10/25/2021 9:17 pm TECHNIQUE: CT of the head was performed without the administration of intravenous contrast. Dose modulation, iterative reconstruction, and/or weight based adjustment of the mA/kV was utilized to reduce the radiation dose to as low as reasonably achievable. COMPARISON: None. HISTORY: ORDERING SYSTEM PROVIDED HISTORY: AMS TECHNOLOGIST PROVIDED HISTORY: Reason for exam:->AMS Has a \"code stroke\" or \"stroke alert\" been called? ->No Decision Support Exception - unselect if not a suspected or confirmed emergency medical condition->Emergency Medical Condition (MA) FINDINGS: BRAIN/VENTRICLES: There is no acute intracranial hemorrhage, mass effect or midline shift. No abnormal extra-axial fluid collection. The gray-white differentiation is maintained without evidence of an acute infarct. There is prominence of the ventricles and sulci due to global parenchymal volume loss. There are nonspecific areas of hypoattenuation within the periventricular and subcortical white matter, which likely represent chronic microvascular ischemic change. ORBITS: The visualized portion of the orbits demonstrate no acute abnormality. SINUSES: The visualized paranasal sinuses and mastoid air cells demonstrate no acute abnormality. SOFT TISSUES/SKULL: No acute abnormality of the visualized skull or soft tissues. No acute intracranial abnormality. XR CHEST PORTABLE    Result Date: 10/25/2021  EXAMINATION: ONE XRAY VIEW OF THE CHEST 10/25/2021 9:22 pm COMPARISON: 01/18/2019 HISTORY: ORDERING SYSTEM PROVIDED HISTORY: AMS TECHNOLOGIST PROVIDED HISTORY: Reason for exam:->AMS FINDINGS: There is no cardiomegaly or vascular congestion.   There is some atelectasis versus infiltrate at the left lung base. There is no pleural effusion or pneumothorax seen. Atelectasis versus infiltrate at left lung base. Patient Instructions:      Medication List      ASK your doctor about these medications    * predniSONE 10 MG tablet  Commonly known as: DELTASONE     * predniSONE 1 MG tablet  Commonly known as: DELTASONE     valsartan-hydroCHLOROthiazide 320-12.5 MG per tablet  Commonly known as: DIOVAN-HCT         * This list has 2 medication(s) that are the same as other medications prescribed for you. Read the directions carefully, and ask your doctor or other care provider to review them with you.                   Note that more than 30 minutes was spent in preparing discharge papers, discussing discharge with patient, medication review, etc.    Signed:  Electronically signed by Oliver Mejia MD on 11/3/2021 at 2:58 PM

## 2021-11-03 NOTE — PROGRESS NOTES
Physician Progress Note      PATIENT:               Bianca Gaxiola  CSN #:                  [id number]  :                       1950  ADMIT DATE:       10/25/2021 8:59 PM  [address] [place] DATE:  RESPONDING  PROVIDER #:        Ashtyn Anand MD          QUERY TEXT:    Patient admitted with Anemia. Patient noted to have Altered mental status. If   possible, please document in the progress notes and discharge summary if you   are evaluating and / or treating any of the following: The medical record reflects the following:  Risk Factors: Anemia  Clinical Indicators: Per ED provider note \". Damián Toribio Damián Toribio Ill-appearing, mild distress,   confused, oriented to self only. .. \". Per IM progress note  \". Damián Toribio Damián Toribio Patient   required blood transfusion on 10/31/21, as his hgb dropped below 7. ..alert and   oriented to person, place and time and in no acute distress. Damián Toribio Damián Toribio \"  Treatment: Blood transfusion  Options provided:  -- Metabolic encephalopathy  -- Delirium due to, Please specify cause. -- Delirium  -- Other - I will add my own diagnosis  -- Disagree - Not applicable / Not valid  -- Disagree - Clinically unable to determine / Unknown  -- Refer to Clinical Documentation Reviewer    PROVIDER RESPONSE TEXT:    This patient has metabolic encephalopathy.     Query created by: David Pereira on 11/3/2021 4:14 PM      Electronically signed by:  Ashtyn Anand MD 11/3/2021 4:59 PM

## 2021-11-03 NOTE — PLAN OF CARE
Problem: Falls - Risk of:  Goal: Will remain free from falls  Description: Will remain free from falls  Outcome: Met This Shift  Goal: Absence of physical injury  Description: Absence of physical injury  Outcome: Met This Shift     Problem: Confusion - Acute:  Goal: Absence of continued neurological deterioration signs and symptoms  Description: Absence of continued neurological deterioration signs and symptoms  Outcome: Met This Shift  Goal: Mental status will be restored to baseline  Description: Mental status will be restored to baseline  Outcome: Met This Shift     Problem: Discharge Planning:  Goal: Ability to perform activities of daily living will improve  Description: Ability to perform activities of daily living will improve  Outcome: Met This Shift  Goal: Participates in care planning  Description: Participates in care planning  Outcome: Met This Shift     Problem: Injury - Risk of, Physical Injury:  Goal: Will remain free from falls  Description: Will remain free from falls  Outcome: Met This Shift  Goal: Absence of physical injury  Description: Absence of physical injury  Outcome: Met This Shift

## 2021-11-03 NOTE — PROGRESS NOTES
P Quality Flow/Interdisciplinary Rounds Progress Note        Quality Flow Rounds held on November 3, 2021    Disciplines Attending:  Bedside Nurse,  and     Alli Cerda was admitted on 10/25/2021  8:59 PM    Anticipated Discharge Date:  Expected Discharge Date: 10/31/21    Disposition:    Damion Score:  Damion Scale Score: 19    Readmission Risk              Risk of Unplanned Readmission:  12           Discussed patient goal for the day, patient clinical progression, and barriers to discharge. The following Goal(s) of the Day/Commitment(s) have been identified:  check hemoc consult's plan, possible discharge later today.       Magaly Howe RN  November 3, 2021

## 2021-11-03 NOTE — PLAN OF CARE
Problem: Falls - Risk of:  Goal: Will remain free from falls  Description: Will remain free from falls  11/3/2021 1504 by Janie Sparks RN  Outcome: Met This Shift  11/3/2021 0424 by Shaheed Green RN  Outcome: Met This Shift     Problem: Confusion - Acute:  Goal: Absence of continued neurological deterioration signs and symptoms  Description: Absence of continued neurological deterioration signs and symptoms  11/3/2021 1504 by Janie Sparks RN  Outcome: Met This Shift  11/3/2021 0424 by Shaheed rGeen RN  Outcome: Met This Shift     Problem: Discharge Planning:  Goal: Ability to perform activities of daily living will improve  Description: Ability to perform activities of daily living will improve  11/3/2021 1504 by Janie Sparks RN  Outcome: Met This Shift  11/3/2021 0424 by Shaheed Green RN  Outcome: Met This Shift

## 2021-11-03 NOTE — PROGRESS NOTES
[facility] Progress Note    Admitting Date and Time: 10/25/2021  8:59 PM  Admit Dx: Anemia requiring transfusions [D64.9]  Altered mental status, unspecified altered mental status type [R41.82]    Subjective:  Patient is being followed for Anemia requiring transfusions [D64.9]  Altered mental status, unspecified altered mental status type [R41.82]   Pt is eating well. His appetite is improving. Patient required blood transfusion on 10/31/21, as his hgb dropped below 7. Bleeding scan was negative. Patient's only complaint is fatigue. Improvement in PTT and stability of hgb today. Hematology still following the patient. ROS: denies fever, chills, cp, sob, n/v, HA unless stated above.       white petrolatum   Topical BID    predniSONE  80 mg Oral Daily    sodium chloride flush  5-40 mL IntraVENous 2 times per day    lisinopril  20 mg Oral Daily    And    hydroCHLOROthiazide  12.5 mg Oral Daily    amoxicillin-clavulanate  1 tablet Oral 3 times per day    pantoprazole  40 mg IntraVENous Daily     sodium chloride, , PRN  sodium chloride flush, 5-40 mL, PRN  sodium chloride, 25 mL, PRN  ondansetron, 4 mg, Q8H PRN   Or  ondansetron, 4 mg, Q6H PRN  acetaminophen, 650 mg, Q6H PRN   Or  acetaminophen, 650 mg, Q6H PRN  HYDROcodone-acetaminophen, 1 tablet, Q6H PRN         Objective:    /63   Pulse 61   Temp 97.6 °F (36.4 °C) (Oral)   Resp 18   Ht 5' 8\" (1.727 m)   Wt 167 lb 6.4 oz (75.9 kg)   SpO2 99%   BMI 25.45 kg/m²     General Appearance: alert and oriented to person, place and time and in no acute distress  Skin: warm and dry  Head: normocephalic and atraumatic  Eyes: pupils equal, round, and reactive to light, extraocular eye movements intact, conjunctivae normal  Neck: neck supple and non tender without mass   Pulmonary/Chest: clear to auscultation bilaterally- no wheezes, rales or rhonchi, normal air movement, no respiratory distress  Cardiovascular: normal rate, normal S1 and S2 and no carotid bruits  Abdomen: soft, non-tender, non-distended, normal bowel sounds, no masses or organomegaly  Extremities: no cyanosis, no clubbing and no edema  Neurologic: no cranial nerve deficit and speech normal; DTRs intact; coordination is intact        No results for input(s): NA, K, CL, CO2, BUN, CREATININE, GLUCOSE, CALCIUM in the last 72 hours. Recent Labs     11/01/21  0420 11/01/21  1653 11/02/21  0545 11/03/21  0055 11/03/21  0625   WBC 3.6*  --  4.6  --  5.6   RBC 2.69*  --  2.23*  --  2.39*   HGB 8.4*   < > 7.1* 7.5* 7.5*   HCT 25.7*   < > 21.3* 23.2* 23.2*   MCV 95.5  --  95.5  --  97.1   MCH 31.2  --  31.8  --  31.4   MCHC 32.7  --  33.3  --  32.3   RDW 14.1  --  14.6  --  14.6     --  276  --  331   MPV 11.2  --  11.9  --  11.7    < > = values in this interval not displayed. Assessment:    Principal Problem:  1. Hemorrhagic complications of dental implant placement  2. Anemia requiring transfusions-monitor hgb closely and transfuse for hgb <7  3. BPH (benign prostatic hyperplasia)  4. Left pleural effusion vs scarring-pulmonology following  5. Significant ecchymoses-hematology consulted; concern for acquired hemophilia;prolonged PTT  6. Chorea like movements (yet otherwise normal neuro exam)-no neuro coverage; CT heat without bleed;symptoms seems to have resolved; continue to monitor  7. Hemoccult positive-likely secondary to swallowing of blood from dental procedure  8. Lyme disease (dx dec 2018)-was treated with abx and prednisone by PCP        Plan:  1. Pulmonology following  2. PTT elevated 141.9  3. Hematology consulted for bleeding disorder-recommend that the patient be transferred to tertiary care facility. Transfer initiated. No beds yet available for transfer. Patient likely has factor 8,9 or von willebrand deficiency  4. Repeat CT head negative  5.   Consulted neurology for opinion on movements, however no neuro coverage here until January; movements have gone away. 6.  Transfuse for hgb <7  7. Continue PPI and carafate  8. Continue home meds for hypertension  9. Patient now on prednisone at 80 mg PO daily for presumed factor 8 or 9 inhibitor per hematology; appreciate their input on home going dose of prednisone. 10.  AMPAC 18/24  11. Hemoccult stool was negative on 10/31/21   12. Bleeding scan-negative    Dispo:  Consider discharge home today with close follow up with hematology as outpatient and likely consultation as outpatient with [facility]    NOTE: This report was transcribed using voice recognition software. Every effort was made to ensure accuracy; however, inadvertent computerized transcription errors may be present.   Electronically signed by Juana Ley MD on 11/3/2021 at 9:53 AM

## 2021-11-05 LAB — MIXING STUDY: NORMAL

## 2022-01-24 ENCOUNTER — HOSPITAL ENCOUNTER (OUTPATIENT)
Age: 72
Discharge: HOME | End: 2022-01-26
Payer: MEDICARE

## 2022-01-24 ENCOUNTER — OFFICE VISIT (OUTPATIENT)
Dept: NEUROSURGERY | Age: 72
End: 2022-01-24
Payer: MEDICARE

## 2022-01-24 ENCOUNTER — HOSPITAL ENCOUNTER (OUTPATIENT)
Dept: GENERAL RADIOLOGY | Age: 72
Discharge: HOME | End: 2022-01-26
Payer: MEDICARE

## 2022-01-24 VITALS
WEIGHT: 167 LBS | HEIGHT: 68 IN | TEMPERATURE: 98.1 F | OXYGEN SATURATION: 96 % | RESPIRATION RATE: 20 BRPM | BODY MASS INDEX: 25.31 KG/M2 | DIASTOLIC BLOOD PRESSURE: 90 MMHG | HEART RATE: 82 BPM | SYSTOLIC BLOOD PRESSURE: 165 MMHG

## 2022-01-24 DIAGNOSIS — S32.010A COMPRESSION FRACTURE OF L1 LUMBAR VERTEBRA, CLOSED, INITIAL ENCOUNTER (HCC): Primary | ICD-10-CM

## 2022-01-24 DIAGNOSIS — S32.010A COMPRESSION FRACTURE OF L1 LUMBAR VERTEBRA, CLOSED, INITIAL ENCOUNTER (HCC): ICD-10-CM

## 2022-01-24 PROCEDURE — 1036F TOBACCO NON-USER: CPT | Performed by: NEUROLOGICAL SURGERY

## 2022-01-24 PROCEDURE — 1123F ACP DISCUSS/DSCN MKR DOCD: CPT | Performed by: NEUROLOGICAL SURGERY

## 2022-01-24 PROCEDURE — G8417 CALC BMI ABV UP PARAM F/U: HCPCS | Performed by: NEUROLOGICAL SURGERY

## 2022-01-24 PROCEDURE — 72100 X-RAY EXAM L-S SPINE 2/3 VWS: CPT

## 2022-01-24 PROCEDURE — G8484 FLU IMMUNIZE NO ADMIN: HCPCS | Performed by: NEUROLOGICAL SURGERY

## 2022-01-24 PROCEDURE — 4040F PNEUMOC VAC/ADMIN/RCVD: CPT | Performed by: NEUROLOGICAL SURGERY

## 2022-01-24 PROCEDURE — 3017F COLORECTAL CA SCREEN DOC REV: CPT | Performed by: NEUROLOGICAL SURGERY

## 2022-01-24 PROCEDURE — 99203 OFFICE O/P NEW LOW 30 MIN: CPT | Performed by: NEUROLOGICAL SURGERY

## 2022-01-24 PROCEDURE — G8427 DOCREV CUR MEDS BY ELIG CLIN: HCPCS | Performed by: NEUROLOGICAL SURGERY

## 2022-01-24 RX ORDER — LIDOCAINE 50 MG/G
PATCH TOPICAL
COMMUNITY
Start: 2022-01-12 | End: 2022-01-25

## 2022-01-24 RX ORDER — HYDROCODONE BITARTRATE AND ACETAMINOPHEN 5; 325 MG/1; MG/1
TABLET ORAL
COMMUNITY
Start: 2022-01-19 | End: 2022-01-25

## 2022-01-24 RX ORDER — CYCLOBENZAPRINE HCL 10 MG
TABLET ORAL
COMMUNITY
Start: 2022-01-03 | End: 2022-01-25

## 2022-01-24 RX ORDER — CHLORHEXIDINE GLUCONATE 0.12 MG/ML
RINSE ORAL
COMMUNITY
Start: 2021-10-26 | End: 2022-01-25

## 2022-01-24 RX ORDER — CYCLOPHOSPHAMIDE 50 MG/1
CAPSULE ORAL
COMMUNITY
Start: 2021-12-10

## 2022-01-24 RX ORDER — PREDNISONE 10 MG/1
TABLET ORAL
COMMUNITY
Start: 2021-12-13 | End: 2022-05-10

## 2022-01-24 NOTE — PROGRESS NOTES
[facility] NEUROSURGERY  [facility] [address]  [phone]       Chief Complaint:   Chief Complaint   Patient presents with    Back Pain     back pain with movement, he uses a walker at home       HPI:     I had the pleasure of seeing Savita Funez today in neurosurgical clinic. As you know this delightful 73-year-old  father of three former smoker and current  presents with complaints of low back pain. According to the patient he was back in [facility] and twisted the wrong way several weeks ago. Against this background and given his significant past medical history which includes Lyme disease and a coagulopathy essentially acquired hemophilia for which she has taken chemotherapy patient states that he has no numbness weakness or tingling. Patient states that standing is very difficult for him and exacerbates his pain. Denied any loss of bowel or bladder function. Past Medical History:   Diagnosis Date    Hypertension     Lyme disease      Past Surgical History:   Procedure Laterality Date    OTHER SURGICAL HISTORY      dental implant      History reviewed. No pertinent family history.    Social History     Socioeconomic History    Marital status:      Spouse name: Not on file    Number of children: Not on file    Years of education: Not on file    Highest education level: Not on file   Occupational History    Not on file   Tobacco Use    Smoking status: Former Smoker     Packs/day: 0.50     Types: Cigarettes     Quit date:      Years since quittin.0    Smokeless tobacco: Never Used   Vaping Use    Vaping Use: Never used   Substance and Sexual Activity    Alcohol use: Yes     Comment: occasionally    Drug use: No    Sexual activity: Not on file   Other Topics Concern    Not on file   Social History Narrative    Not on file     Social Determinants of Health Financial Resource Strain:     Difficulty of Paying Living Expenses: Not on file   Food Insecurity:     Worried About Running Out of Food in the Last Year: Not on file    Trinh of Food in the Last Year: Not on file   Transportation Needs:     Lack of Transportation (Medical): Not on file    Lack of Transportation (Non-Medical):  Not on file   Physical Activity:     Days of Exercise per Week: Not on file    Minutes of Exercise per Session: Not on file   Stress:     Feeling of Stress : Not on file   Social Connections:     Frequency of Communication with Friends and Family: Not on file    Frequency of Social Gatherings with Friends and Family: Not on file    Attends Judaism Services: Not on file    Active Member of [address] [place] [address] [employer] or Organizations: Not on file    Attends Club or Organization Meetings: Not on file    Marital Status: Not on file   Intimate Partner Violence:     Fear of Current or Ex-Partner: Not on file    Emotionally Abused: Not on file    Physically Abused: Not on file    Sexually Abused: Not on file   Housing Stability:     Unable to Pay for Housing in the Last Year: Not on file    Number of [place] in the Last Year: Not on file    Unstable Housing in the Last Year: Not on file       Medications:   Current Outpatient Medications   Medication Sig Dispense Refill    chlorhexidine (PERIDEX) 0.12 % solution apply with A Q TIP once daily to IMPLANT SITES      cyclobenzaprine (FLEXERIL) 10 MG tablet take 1 tablet by mouth twice a day if needed      cyclophosphamide (CYTOXAN) 50 MG CAPS capsule       HYDROcodone-acetaminophen (NORCO) 5-325 MG per tablet take 1 tablet by mouth twice a day      lidocaine (LIDODERM) 5 % apply 1 patch to affected area daily **LEAVE ON FOR 12 HOURS THEN OFF FOR 12 HOURS**      predniSONE (DELTASONE) 10 MG tablet take 3 tablets by mouth once daily with food      ferrous sulfate (FE TABS 325) 325 (65 Fe) MG EC tablet Take 1 tablet by mouth daily (with breakfast) 90 tablet 0    pantoprazole (PROTONIX) 40 MG tablet Take 1 tablet by mouth every morning (before breakfast) 90 tablet 0    valsartan-hydroCHLOROthiazide (DIOVAN-HCT) 320-12.5 MG per tablet Take 1 tablet by mouth daily       No current facility-administered medications for this visit. Allergies:    Doxycycline       Review of Systems:    Denies any chest pain,  headache, dyspnea, recent weight loss, fevers, chills or night sweats. Physical Examination:    BP (!) 165/90   Pulse 82   Temp 98.1 °F (36.7 °C)   Resp 20   Ht 5' 8\" (1.727 m)   Wt 167 lb (75.8 kg)   SpO2 96%   BMI 25.39 kg/m²      On focused neurological examination, he  is awake alert oriented and rationally conversant. Speech is clear and fluent. Pupils are equal and reactive to light bilaterally, extraocular movements are intact, visual fields are full to confrontation. His  face is symmetric and grossly intact to fine touch. Uvula and tongue are both midline. Shoulder shrug is symmetric and strong. Cervical spine is well aligned and nontender to direct palpation. Motor examination reveals preserved power in the upper and lower extremities at 5 out of 5 throughout. Reflexes are symmetric and brisk. Plantar responses are downgoing. There is no clonus. Patient is intact to fine touch in all dermatomes throughout. Palpation of the spine reveals no kyphotic or scoliotic gross deformities. There is no pain to deep percussion nor palpation. ASSESSMENT:    I personally reviewed Adam Shyam Kristopher's radiographic images, particularly his MRI from [facility] of the lumbar spine dated 18 January 2022 which shows an old compression fracture at T12 which was quite significant as well as mild compression fracture of the L1 vertebral body. 1. Compression fracture of L1 lumbar vertebra, closed, initial encounter ([facility] [address]  -     External Referral To Orthotics  -     XR LUMBAR SPINE (2-3 VIEWS);  Future  - Carlee Maldonado MD, Pain Medicine, [facility]; Future      MEDICAL DECISION MAKING & PLAN:    I showed Mr. Susan Ugalde his wife present with him today his images and explained the findings. Certainly a trial of conservative treatment namely an LSO brace for support and pain management is indicated at this juncture. We will obtain an MRI with and without contrast to rule out underlying malignancy given this gentlemen's significant medical history. Also provided referral for pain management to help in the acute setting and he will return to clinic in approximately 4 weeks with the above results in hand unless he progresses or develops new neurosurgical issues. Thank you so much for allowing us to participate in the care of this patient. Return in about 4 weeks (around 2/21/2022) for needs tests completed prior to appt, discuss results. Electronically signed by Tristian Hensley MD on 1/25/2022 at 10:46 AM       NOTE: This report was transcribed using voice recognition software.  Every effort was made to ensure accuracy; however, inadvertent computerized transcription errors may be present

## 2022-01-25 ENCOUNTER — OFFICE VISIT (OUTPATIENT)
Dept: PAIN MANAGEMENT | Age: 72
End: 2022-01-25
Payer: MEDICARE

## 2022-01-25 VITALS
RESPIRATION RATE: 16 BRPM | WEIGHT: 167 LBS | DIASTOLIC BLOOD PRESSURE: 80 MMHG | BODY MASS INDEX: 25.31 KG/M2 | OXYGEN SATURATION: 94 % | TEMPERATURE: 97.7 F | HEART RATE: 102 BPM | HEIGHT: 68 IN | SYSTOLIC BLOOD PRESSURE: 132 MMHG

## 2022-01-25 DIAGNOSIS — G89.4 CHRONIC PAIN SYNDROME: ICD-10-CM

## 2022-01-25 DIAGNOSIS — S22.080A T12 COMPRESSION FRACTURE, INITIAL ENCOUNTER (HCC): ICD-10-CM

## 2022-01-25 DIAGNOSIS — M47.816 LUMBAR FACET ARTHROPATHY: ICD-10-CM

## 2022-01-25 DIAGNOSIS — M48.061 SPINAL STENOSIS OF LUMBAR REGION WITHOUT NEUROGENIC CLAUDICATION: ICD-10-CM

## 2022-01-25 DIAGNOSIS — M51.9 LUMBAR DISC DISORDER: ICD-10-CM

## 2022-01-25 DIAGNOSIS — M47.816 LUMBAR SPONDYLOSIS: ICD-10-CM

## 2022-01-25 DIAGNOSIS — S32.010A CLOSED COMPRESSION FRACTURE OF BODY OF L1 VERTEBRA (HCC): Primary | ICD-10-CM

## 2022-01-25 PROCEDURE — 99204 OFFICE O/P NEW MOD 45 MIN: CPT | Performed by: PAIN MEDICINE

## 2022-01-25 PROCEDURE — 1123F ACP DISCUSS/DSCN MKR DOCD: CPT | Performed by: PAIN MEDICINE

## 2022-01-25 PROCEDURE — 99204 OFFICE O/P NEW MOD 45 MIN: CPT

## 2022-01-25 PROCEDURE — 4040F PNEUMOC VAC/ADMIN/RCVD: CPT | Performed by: PAIN MEDICINE

## 2022-01-25 PROCEDURE — G8484 FLU IMMUNIZE NO ADMIN: HCPCS | Performed by: PAIN MEDICINE

## 2022-01-25 PROCEDURE — 1036F TOBACCO NON-USER: CPT | Performed by: PAIN MEDICINE

## 2022-01-25 PROCEDURE — G8427 DOCREV CUR MEDS BY ELIG CLIN: HCPCS | Performed by: PAIN MEDICINE

## 2022-01-25 PROCEDURE — G8417 CALC BMI ABV UP PARAM F/U: HCPCS | Performed by: PAIN MEDICINE

## 2022-01-25 PROCEDURE — 3017F COLORECTAL CA SCREEN DOC REV: CPT | Performed by: PAIN MEDICINE

## 2022-01-25 RX ORDER — CALCIUM CARBONATE 500(1250)
500 TABLET ORAL DAILY
COMMUNITY

## 2022-01-25 RX ORDER — FERROUS SULFATE 325(65) MG
TABLET ORAL
COMMUNITY
Start: 2021-11-03 | End: 2022-01-25

## 2022-01-25 RX ORDER — OXYCODONE AND ACETAMINOPHEN 7.5; 325 MG/1; MG/1
1 TABLET ORAL 2 TIMES DAILY
Qty: 28 TABLET | Refills: 0 | Status: SHIPPED
Start: 2022-01-25 | End: 2022-02-08 | Stop reason: SDUPTHER

## 2022-01-25 NOTE — PROGRESS NOTES
Do you currently have any of the following:    Fever: No  Headache:  No  Cough: No  Shortness of breath: No  Exposed to anyone with these symptoms: Agnieszka Crespo presents to the [facility] on 1/25/2022. Candelario Sow is complaining of pain in lower back. . The pain is constant. The pain is described as aching, sharp, miserable and unbearable. Pain is rated on his best day at a 8, on his worst day at a 10, and on average at a 9 on the VAS scale. He took his last dose of Advil . Candelario Sow does not have issues with constipation. Any procedures since your last visit: No,     He is  on NSAIDS and  is not on anticoagulation medications to include none and is managed by Patrick De León MD  .     Pacemaker or defibrillator: No Physician managing device is NA. Medication Contract and Consent for Opioid Use Documents Filed      No documents found                   /80   Pulse 102   Temp 97.7 °F (36.5 °C) (Infrared)   Resp 16   Ht 5' 8\" (1.727 m)   Wt 167 lb (75.8 kg)   SpO2 94%   BMI 25.39 kg/m²      No LMP for male patient.

## 2022-01-25 NOTE — PROGRESS NOTES
[address]        [address] [facility], [address] [facility]      [phone]          Consult Note      Patient:  ANASTACIA Caceres 1950    Date of Service:  22    Requesting Physician:  David Sanchez MD    Reason for Consult:      Patient presents with complaints of low back pain that started 4 weeks ago and has been progressively getting worse. pain     HISTORY OF PRESENT ILLNESS:      Pain does not radiate. He  does not have numbness, tingling, weakness and does not have bladder or bowel dysfunction. He has not been on anticoagulation medications to include none. The patient  has not been on herbal supplements. The patient is not diabetic. Imaging:   Lumbar spine MRI 2022  Mild loss of height of the L1 vertebral body with edema involving the superior aspect cannot exclude acute or subacute compression fracture. 80% compression fracture of T12 which is old. Spinal stenosis at multiple levels. Lumbar spine Xray     New vertebral compression injuries at T12 and L1 as noted.  Degenerative   spondylosis.  Right-sided nephrolithiasis suggested. Previous treatments: medications. .      Opioid Agreement:  Date enacted:N/A  Renewal date:N/A    Past Medical History:   Diagnosis Date    Blood disorder     blood didn't want to clot, going to the [facility] (acquired hemophiliac)    Hypertension     Lyme disease      Past Surgical History:   Procedure Laterality Date    COLONOSCOPY      CYST REMOVAL      OTHER SURGICAL HISTORY      dental implant     Prior to Admission medications    Medication Sig Start Date End Date Taking?  Authorizing Provider   Multiple Vitamin (MULTI-VITAMIN DAILY PO) Take by mouth   Yes Historical Provider, MD   calcium carbonate (OSCAL) 500 MG TABS tablet Take 500 mg by mouth daily   Yes Historical Provider, MD   cyclophosphamide (CYTOXAN) 50 MG CAPS capsule  12/10/21  Yes Historical Provider, MD predniSONE (DELTASONE) 10 MG tablet take 3 tablets by mouth once daily with food 21  Yes Historical Provider, MD   valsartan-hydroCHLOROthiazide (DIOVAN-HCT) 320-12.5 MG per tablet Take 1 tablet by mouth daily   Yes Historical Provider, MD     Allergies   Allergen Reactions    Doxycycline Rash     Social History     Socioeconomic History    Marital status:      Spouse name: Not on file    Number of children: Not on file    Years of education: Not on file    Highest education level: Not on file   Occupational History    Not on file   Tobacco Use    Smoking status: Former Smoker     Packs/day: 0.50     Types: Cigarettes     Quit date:      Years since quittin.0    Smokeless tobacco: Never Used   Vaping Use    Vaping Use: Never used   Substance and Sexual Activity    Alcohol use: Yes     Comment: occasionally    Drug use: No    Sexual activity: Not on file   Other Topics Concern    Not on file   Social History Narrative    Not on file     Social Determinants of Health     Financial Resource Strain:     Difficulty of Paying Living Expenses: Not on file   Food Insecurity:     Worried About [address] in the Last Year: Not on file    [address] in the Last Year: Not on file   Transportation Needs:     Lack of Transportation (Medical): Not on file    Lack of Transportation (Non-Medical):  Not on file   Physical Activity:     Days of Exercise per Week: Not on file    Minutes of Exercise per Session: Not on file   Stress:     Feeling of Stress : Not on file   Social Connections:     Frequency of Communication with Friends and Family: Not on file    Frequency of Social Gatherings with Friends and Family: Not on file    Attends Rastafarian Services: Not on file    Active Member of Clubs or Organizations: Not on file    Attends Club or Organization Meetings: Not on file    Marital Status: Not on file   Intimate Partner Violence:     Fear of Current or Ex-Partner: Not on file    Emotionally Abused: Not on file    Physically Abused: Not on file    Sexually Abused: Not on file   Housing Stability:     Unable to Pay for Housing in the Last Year: Not on file    Number of Places Lived in the Last Year: Not on file    Unstable Housing in the Last Year: Not on file     History reviewed. No pertinent family history. REVIEW OF SYSTEMS:     Patient specifically denies fever/chills, chest pain, shortness of breath, new bowel or bladder complaints. All other review of systems was negative. PHYSICAL EXAMINATION:      /80   Pulse 102   Temp 97.7 °F (36.5 °C) (Infrared)   Resp 16   Ht 5' 8\" (1.727 m)   Wt 167 lb (75.8 kg)   SpO2 94%   BMI 25.39 kg/m²     General:      General appearance:   elderly, pleasant and well-hydrated. , in moderate discomfort and A & O x3  Build:Normal Weight    HEENT:    Head:normocephalic and atraumatic  Sclera: icterus absent,     Lungs:    Breathing:Breathing Pattern: regular, no distress    Abdomen:    Shape:non-distended and normal  Tenderness:none    Lumbar spine:    Spine inspection:exaggerated kyphosis   CVA tenderness:No   Palpation:tenderness paravertebral muscles, facet loading, left, right, positive and tenderness. Range of motion:abnormal moderately Lateral bending, flexion, extension rotation bilateral and is  painful. Musculoskeletal:    Trigger points in Paraveteral:absent bilaterally  SI joint tenderness:negative right, negative left              JOSH test:not done right, not done             left  Piriformis tenderness:negative right, negative left  Trochanteric bursa tenderness:negative right, negative left  SLR:negative right, negative left, sitting     Extremities:    Tremors:None bilaterally upper and lower  Range of motion: pain with internal rotation of hips negative.   Intact:Yes  Edema:Normal    Neurological:    Sensory:normal to light touch bilateral lower extremities  Motor: Right Quadriceps4/5          Left Quadriceps4/5           Right Gastrocnemius4/5    Left Gastrocnemius4/5  Right Ant Tibialis4/5  Left Ant Tibialis4/5  Reflexes:      Right Quadriceps reflex2+  Left Quadriceps reflex2+  Right Achilles reflex2+  Left Achilles reflex1+  Gait:antalgic with a walker    Dermatology:    Skin:no unusual rashes and no skin lesions    Impression:  Low back pain T12 & L1 compression fracture with h/o acquired hemophilia   Currently going to [facility] for treatment of his hemophilia  Plan:  Reviewed imaging studies and discussed with the patient and his wife. Reviewed neurosurgery notes. Discussed treatment options with the patient including interventional procedures and medications management. Will proceed with medications management since patient is a high risk because of his acquired hemophilia. Will start patient on Norco 7.5/325 BID. Continue with back brace. Urine screen today. OARRS report reviewed 01/2022. Patient encouraged to stay active. Treatment plan discussed with the patient including medications side effects. We discussed with the patient that combining opioids, benzodiazepines, alcohol, illicit drugs or sleep aids increases the risk of respiratory depression including death. We discussed that these medications may cause drowsiness, sedation or dizziness and have counseled the patient not to drive or operate machinery. We have discussed that these medications will be prescribed only by one provider. We have discussed with the patient about age related risk factors and have thoroughly discussed the importance of taking these medications as prescribed. The patient verbalizes understanding. eusebio Parra M.D.

## 2022-01-27 PROBLEM — M51.9 LUMBAR DISC DISORDER: Status: ACTIVE | Noted: 2022-01-27

## 2022-01-27 PROBLEM — M47.816 LUMBAR FACET ARTHROPATHY: Status: ACTIVE | Noted: 2022-01-27

## 2022-01-27 PROBLEM — M48.061 SPINAL STENOSIS OF LUMBAR REGION WITHOUT NEUROGENIC CLAUDICATION: Status: ACTIVE | Noted: 2022-01-27

## 2022-01-27 PROBLEM — G89.4 CHRONIC PAIN SYNDROME: Status: ACTIVE | Noted: 2022-01-27

## 2022-01-27 PROBLEM — S32.010A CLOSED COMPRESSION FRACTURE OF BODY OF L1 VERTEBRA (HCC): Status: ACTIVE | Noted: 2022-01-27

## 2022-01-27 PROBLEM — M47.816 LUMBAR SPONDYLOSIS: Status: ACTIVE | Noted: 2022-01-27

## 2022-01-27 PROBLEM — S22.080A T12 COMPRESSION FRACTURE, INITIAL ENCOUNTER (HCC): Status: ACTIVE | Noted: 2022-01-27

## 2022-02-01 DIAGNOSIS — S32.010A COMPRESSION FRACTURE OF L1 LUMBAR VERTEBRA, CLOSED, INITIAL ENCOUNTER (HCC): Primary | ICD-10-CM

## 2022-02-07 ENCOUNTER — HOSPITAL ENCOUNTER (OUTPATIENT)
Age: 72
Discharge: HOME | End: 2022-02-07
Payer: MEDICARE

## 2022-02-07 ENCOUNTER — HOSPITAL ENCOUNTER (OUTPATIENT)
Dept: MRI IMAGING | Age: 72
Discharge: HOME | End: 2022-02-09
Payer: MEDICARE

## 2022-02-07 DIAGNOSIS — S32.010A COMPRESSION FRACTURE OF L1 LUMBAR VERTEBRA, CLOSED, INITIAL ENCOUNTER (HCC): ICD-10-CM

## 2022-02-07 LAB
BUN BLDV-MCNC: 37 MG/DL (ref 6–23)
CREAT SERPL-MCNC: 1.8 MG/DL (ref 0.7–1.2)
GFR AFRICAN AMERICAN: 45
GFR NON-AFRICAN AMERICAN: 37 ML/MIN/1.73

## 2022-02-07 PROCEDURE — 82565 ASSAY OF CREATININE: CPT

## 2022-02-07 PROCEDURE — 72158 MRI LUMBAR SPINE W/O & W/DYE: CPT

## 2022-02-07 PROCEDURE — 36415 COLL VENOUS BLD VENIPUNCTURE: CPT

## 2022-02-07 PROCEDURE — 6360000004 HC RX CONTRAST MEDICATION: Performed by: RADIOLOGY

## 2022-02-07 PROCEDURE — A9577 INJ MULTIHANCE: HCPCS | Performed by: RADIOLOGY

## 2022-02-07 PROCEDURE — 84520 ASSAY OF UREA NITROGEN: CPT

## 2022-02-07 RX ORDER — SODIUM CHLORIDE 0.9 % (FLUSH) 0.9 %
5-40 SYRINGE (ML) INJECTION 2 TIMES DAILY
Status: DISCONTINUED | OUTPATIENT
Start: 2022-02-07 | End: 2022-02-10 | Stop reason: HOSPADM

## 2022-02-07 RX ADMIN — GADOBENATE DIMEGLUMINE 13 ML: 529 INJECTION, SOLUTION INTRAVENOUS at 11:28

## 2022-02-08 ENCOUNTER — OFFICE VISIT (OUTPATIENT)
Dept: PAIN MANAGEMENT | Age: 72
End: 2022-02-08
Payer: MEDICARE

## 2022-02-08 VITALS
RESPIRATION RATE: 16 BRPM | DIASTOLIC BLOOD PRESSURE: 66 MMHG | HEART RATE: 98 BPM | HEIGHT: 68 IN | TEMPERATURE: 97.1 F | SYSTOLIC BLOOD PRESSURE: 106 MMHG | WEIGHT: 167 LBS | BODY MASS INDEX: 25.31 KG/M2

## 2022-02-08 DIAGNOSIS — M47.816 LUMBAR FACET ARTHROPATHY: Primary | ICD-10-CM

## 2022-02-08 DIAGNOSIS — M47.816 LUMBAR SPONDYLOSIS: ICD-10-CM

## 2022-02-08 DIAGNOSIS — S22.080A T12 COMPRESSION FRACTURE, INITIAL ENCOUNTER (HCC): ICD-10-CM

## 2022-02-08 DIAGNOSIS — M48.061 SPINAL STENOSIS OF LUMBAR REGION WITHOUT NEUROGENIC CLAUDICATION: ICD-10-CM

## 2022-02-08 DIAGNOSIS — M51.9 LUMBAR DISC DISORDER: ICD-10-CM

## 2022-02-08 DIAGNOSIS — G89.4 CHRONIC PAIN SYNDROME: ICD-10-CM

## 2022-02-08 DIAGNOSIS — S32.010A CLOSED COMPRESSION FRACTURE OF BODY OF L1 VERTEBRA (HCC): ICD-10-CM

## 2022-02-08 PROCEDURE — 99213 OFFICE O/P EST LOW 20 MIN: CPT | Performed by: PAIN MEDICINE

## 2022-02-08 PROCEDURE — 3017F COLORECTAL CA SCREEN DOC REV: CPT | Performed by: PAIN MEDICINE

## 2022-02-08 PROCEDURE — 1036F TOBACCO NON-USER: CPT | Performed by: PAIN MEDICINE

## 2022-02-08 PROCEDURE — 1123F ACP DISCUSS/DSCN MKR DOCD: CPT | Performed by: PAIN MEDICINE

## 2022-02-08 PROCEDURE — G8484 FLU IMMUNIZE NO ADMIN: HCPCS | Performed by: PAIN MEDICINE

## 2022-02-08 PROCEDURE — 4040F PNEUMOC VAC/ADMIN/RCVD: CPT | Performed by: PAIN MEDICINE

## 2022-02-08 PROCEDURE — G8427 DOCREV CUR MEDS BY ELIG CLIN: HCPCS | Performed by: PAIN MEDICINE

## 2022-02-08 PROCEDURE — G8417 CALC BMI ABV UP PARAM F/U: HCPCS | Performed by: PAIN MEDICINE

## 2022-02-08 RX ORDER — ONDANSETRON HYDROCHLORIDE 8 MG/1
TABLET, FILM COATED ORAL
COMMUNITY
Start: 2022-02-02 | End: 2022-08-12

## 2022-02-08 RX ORDER — OXYCODONE AND ACETAMINOPHEN 7.5; 325 MG/1; MG/1
1 TABLET ORAL 2 TIMES DAILY
Qty: 60 TABLET | Refills: 0 | Status: SHIPPED | OUTPATIENT
Start: 2022-02-08 | End: 2022-03-10

## 2022-02-08 NOTE — PROGRESS NOTES
Do you currently have any of the following:    Fever: No  Headache:  No  Cough: No  Shortness of breath: No  Exposed to anyone with these symptoms: Agnieszka Hubbard presents to the [facility] on 2/8/2022. Mini Cui is complaining of pain in his lower back. . The pain is constant. The pain is described as aching, stabbing and sharp. Pain is rated on his best day at a 1, on his worst day at a 9, and on average at a 5 on the VAS scale. He took his last dose of Percocet . Mini Cui does not have issues with constipation. Any procedures since your last visit: No,     He is not on NSAIDS and  is not on anticoagulation medications to include none and is managed by NA. Pacemaker or defibrillator: No Physician managing device is NA. Medication Contract and Consent for Opioid Use Documents Filed      No documents found                   /66   Pulse 98   Temp 97.1 °F (36.2 °C) (Infrared)   Resp 16   Ht 5' 8\" (1.727 m)   Wt 167 lb (75.8 kg)   BMI 25.39 kg/m²      No LMP for male patient.

## 2022-02-08 NOTE — PROGRESS NOTES
[facility]  [address] [facility], [address] [place] [address] Alan  [phone]    Follow up Note      Angélica Bashir     Date of Visit:  2/8/2022    CC:  Patient presents for follow up   Chief Complaint   Patient presents with    Results     MRI       HPI:    Pain is slightly  better. Appropriate analgesia with current medications regimen: yes - fair. Change in quality of symptoms:no. Medication side effects:none. Recent diagnostic testing:Lumbar spine MRI with contrast.   Recent interventional procedures:none. .    He has not been on anticoagulation medications to include none. The patient  has not been on herbal supplements. The patient is not diabetic. Imaging:   Lumbar spine MRI 01/2022  Mild loss of height of the L1 vertebral body with edema involving the superior aspect cannot exclude acute or subacute compression fracture. 80% compression fracture of T12 which is old. Spinal stenosis at multiple levels. Lumbar spine Xray 2022   New vertebral compression injuries at T12 and L1 as noted.  Degenerative   spondylosis.  Right-sided nephrolithiasis suggested. Lumbar spine MRI with contrast  1. Subacute severe compression fractures of T12 and L1, worse at L1.  Both   vertebral bodies show slight retropulsion with no significant central canal   stenosis.  No evidence to suggest epidural hematoma. 2. Mild central canal stenosis and bilateral subarticular recess stenosis at   L4-5.   3. No other significant central canal stenosis or disc herniation seen. Degenerative change. Previous treatments: medications. .          Potential Aberrant Drug-Related Behavior:    No    Urine Drug Screening:    OARRS report:  02/2022 consistent     Opioid Agreement:  Date enacted:02/2022  Renewal date:02/2023    Past Medical History:   Diagnosis Date    Blood disorder     blood didn't want to clot, going to the [facility] (acquired hemophiliac)    Hypertension     Lyme disease      Past Surgical History:   Procedure Laterality Date    COLONOSCOPY      CYST REMOVAL      OTHER SURGICAL HISTORY      dental implant     Prior to Admission medications    Medication Sig Start Date End Date Taking? Authorizing Provider   ondansetron (ZOFRAN) 8 MG tablet take 1 tablet by mouth every 8 hours if needed for nausea and vomiting 22  Yes Historical Provider, MD   Multiple Vitamin (MULTI-VITAMIN DAILY PO) Take by mouth   Yes Historical Provider, MD   calcium carbonate (OSCAL) 500 MG TABS tablet Take 500 mg by mouth daily   Yes Historical Provider, MD   oxyCODONE-acetaminophen (PERCOCET) 7.5-325 MG per tablet Take 1 tablet by mouth 2 times daily for 14 days.  Intended supply: 30 days 22 Yes Tono Garcia MD   Handicap Placard MISC by Does not apply route Above patient unable to ambulate more than 200 feet without stopping to rest.  Expires: 2024  Yes Tono Garcia MD   cyclophosphamide (CYTOXAN) 50 MG CAPS capsule  12/10/21  Yes Historical Provider, MD   predniSONE (DELTASONE) 10 MG tablet take 3 tablets by mouth once daily with food 21  Yes Historical Provider, MD   valsartan-hydroCHLOROthiazide (DIOVAN-HCT) 320-12.5 MG per tablet Take 1 tablet by mouth daily   Yes Historical Provider, MD     Allergies   Allergen Reactions    Doxycycline Rash     Social History     Socioeconomic History    Marital status:      Spouse name: Not on file    Number of children: Not on file    Years of education: Not on file    Highest education level: Not on file   Occupational History    Not on file   Tobacco Use    Smoking status: Former Smoker     Packs/day: 0.50     Types: Cigarettes     Quit date:      Years since quittin.1    Smokeless tobacco: Never Used   Vaping Use    Vaping Use: Never used   Substance and Sexual Activity    Alcohol use: Yes     Comment: occasionally    Drug use: No    Sexual activity: Not on file   Other Topics Concern    Not on file   Social History Narrative    Not on file     Social Determinants of Health     Financial Resource Strain:     Difficulty of Paying Living Expenses: Not on file   Food Insecurity:     Worried About Running Out of Food in the Last Year: Not on file    Trinh of Food in the Last Year: Not on file   Transportation Needs:     Lack of Transportation (Medical): Not on file    Lack of Transportation (Non-Medical): Not on file   Physical Activity:     Days of Exercise per Week: Not on file    Minutes of Exercise per Session: Not on file   Stress:     Feeling of Stress : Not on file   Social Connections:     Frequency of Communication with Friends and Family: Not on file    Frequency of Social Gatherings with Friends and Family: Not on file    Attends Adventist Services: Not on file    Active Member of [address] [place] [address] [employer] or Organizations: Not on file    Attends Club or Organization Meetings: Not on file    Marital Status: Not on file   Intimate Partner Violence:     Fear of Current or Ex-Partner: Not on file    Emotionally Abused: Not on file    Physically Abused: Not on file    Sexually Abused: Not on file   Housing Stability:     Unable to Pay for Housing in the Last Year: Not on file    Number of [place] in the Last Year: Not on file    Unstable Housing in the Last Year: Not on file     History reviewed. No pertinent family history. REVIEW OF SYSTEMS:     Amanda Renteria denies fever/chills, chest pain, shortness of breath, new bowel or bladder complaints. All other review of systems was negative. PHYSICAL EXAMINATION:      /66   Pulse 98   Temp 97.1 °F (36.2 °C) (Infrared)   Resp 16   Ht 5' 8\" (1.727 m)   Wt 167 lb (75.8 kg)   BMI 25.39 kg/m²     General:       General appearance:   elderly, pleasant and well-hydrated.    , in moderate discomfort and A & O x3  Build:Normal Weight     HEENT:     Head:normocephalic and atraumatic  Sclera: icterus absent,      Lungs:     Breathing:Breathing Pattern: regular, no distress     Abdomen:     Shape:non-distended and normal  Tenderness:none     Lumbar spine:     Spine inspection:exaggerated kyphosis/wearing back brace  CVA tenderness:No   Palpation:tenderness paravertebral muscles, facet loading, left, right, positive and tenderness. Range of motion:abnormal moderately Lateral bending, flexion, extension rotation bilateral and is  painful.     Musculoskeletal:     Trigger points in Paraveteral:absent bilaterally  SI joint tenderness:negative right, negative left  SLR:negative right, negative left, sitting      Extremities:     Tremors:None bilaterally upper and lower  Range of motion: pain with internal rotation of hips negative. Intact:Yes  Edema:Normal     Neurological:     Sensory:normal to light touch bilateral lower extremities  Motor:                             Right Quadriceps4/5          Left Quadriceps4/5           Right Gastrocnemius4/5    Left Gastrocnemius4/5  Right Ant Tibialis4/5  Left Ant Tibialis4/5  Reflexes:       Right Quadriceps reflex2+  Left Quadriceps reflex2+  Right Achilles reflex2+  Left Achilles reflex1+  Gait:antalgic with a walker     Dermatology:     Skin:no unusual rashes and no skin lesions     Impression:  Low back pain T12 & L1 compression fracture with h/o acquired hemophilia   Currently going to [address] [place] [address] for treatment of his hemophilia  Plan:  Follow up on his low back pain with no acute issues. Reviewed lumbar spine MRI with contrast.Reviewed imaging studies and discussed with the patient and his wife. Continue with Percocet 7.5/325 BID. Continue with back brace. Urine screen today/opioid agreement. OARRS report reviewed 02/2022. Patient encouraged to stay active. Treatment plan discussed with the patient including medications side effects. We discussed with the patient that combining opioids, benzodiazepines, alcohol, illicit drugs or sleep aids increases the risk of respiratory depression including death. We discussed that these medications may cause drowsiness, sedation or dizziness and have counseled the patient not to drive or operate machinery. We have discussed that these medications will be prescribed only by one provider. We have discussed with the patient about age related risk factors and have thoroughly discussed the importance of taking these medications as prescribed. The patient verbalizes understanding. ccrefsaúl Siddiqui M.D.

## 2022-03-08 ENCOUNTER — OFFICE VISIT (OUTPATIENT)
Dept: PAIN MANAGEMENT | Age: 72
End: 2022-03-08
Payer: MEDICARE

## 2022-03-08 VITALS
SYSTOLIC BLOOD PRESSURE: 140 MMHG | RESPIRATION RATE: 16 BRPM | WEIGHT: 150 LBS | OXYGEN SATURATION: 93 % | DIASTOLIC BLOOD PRESSURE: 86 MMHG | HEART RATE: 81 BPM | HEIGHT: 70 IN | TEMPERATURE: 97.5 F | BODY MASS INDEX: 21.47 KG/M2

## 2022-03-08 DIAGNOSIS — G89.4 CHRONIC PAIN SYNDROME: ICD-10-CM

## 2022-03-08 DIAGNOSIS — S22.080A T12 COMPRESSION FRACTURE, INITIAL ENCOUNTER (HCC): Primary | ICD-10-CM

## 2022-03-08 DIAGNOSIS — M48.061 SPINAL STENOSIS OF LUMBAR REGION WITHOUT NEUROGENIC CLAUDICATION: ICD-10-CM

## 2022-03-08 DIAGNOSIS — M47.816 LUMBAR SPONDYLOSIS: ICD-10-CM

## 2022-03-08 DIAGNOSIS — M47.816 LUMBAR FACET ARTHROPATHY: ICD-10-CM

## 2022-03-08 DIAGNOSIS — S32.010A CLOSED COMPRESSION FRACTURE OF BODY OF L1 VERTEBRA (HCC): ICD-10-CM

## 2022-03-08 DIAGNOSIS — M51.9 LUMBAR DISC DISORDER: ICD-10-CM

## 2022-03-08 PROCEDURE — 3017F COLORECTAL CA SCREEN DOC REV: CPT | Performed by: PAIN MEDICINE

## 2022-03-08 PROCEDURE — G8484 FLU IMMUNIZE NO ADMIN: HCPCS | Performed by: PAIN MEDICINE

## 2022-03-08 PROCEDURE — 1036F TOBACCO NON-USER: CPT | Performed by: PAIN MEDICINE

## 2022-03-08 PROCEDURE — G8420 CALC BMI NORM PARAMETERS: HCPCS | Performed by: PAIN MEDICINE

## 2022-03-08 PROCEDURE — G8427 DOCREV CUR MEDS BY ELIG CLIN: HCPCS | Performed by: PAIN MEDICINE

## 2022-03-08 PROCEDURE — 99213 OFFICE O/P EST LOW 20 MIN: CPT | Performed by: PAIN MEDICINE

## 2022-03-08 PROCEDURE — 4040F PNEUMOC VAC/ADMIN/RCVD: CPT | Performed by: PAIN MEDICINE

## 2022-03-08 PROCEDURE — 1123F ACP DISCUSS/DSCN MKR DOCD: CPT | Performed by: PAIN MEDICINE

## 2022-03-08 RX ORDER — OXYCODONE HYDROCHLORIDE AND ACETAMINOPHEN 5; 325 MG/1; MG/1
1 TABLET ORAL 2 TIMES DAILY PRN
Qty: 60 TABLET | Refills: 0 | Status: SHIPPED
Start: 2022-03-10 | End: 2022-04-05 | Stop reason: SDUPTHER

## 2022-03-08 RX ORDER — MIRTAZAPINE 15 MG/1
TABLET, FILM COATED ORAL
COMMUNITY
Start: 2022-02-09

## 2022-03-08 NOTE — PROGRESS NOTES
[address] [facility], [address] [place] [address] Alan  [phone]    Follow up Note      Tray Klinefelter     Date of Visit:  3/8/2022    CC:  Patient presents for follow up   Chief Complaint   Patient presents with    Follow-up    Back Pain     lower       HPI:    Pain is slightly better. Appropriate analgesia with current medications regimen: yes - fair. Change in quality of symptoms:no. Medication side effects:none. Recent diagnostic testing:none  Recent interventional procedures:none. .    He has not been on anticoagulation medications to include none. The patient  has not been on herbal supplements. The patient is not diabetic. Imaging:   Lumbar spine MRI 2022  Mild loss of height of the L1 vertebral body with edema involving the superior aspect cannot exclude acute or subacute compression fracture. 80% compression fracture of T12 which is old. Spinal stenosis at multiple levels. Lumbar spine Xray    New vertebral compression injuries at T12 and L1 as noted.  Degenerative   spondylosis.  Right-sided nephrolithiasis suggested. Lumbar spine MRI with contrast  1. Subacute severe compression fractures of T12 and L1, worse at L1.  Both   vertebral bodies show slight retropulsion with no significant central canal   stenosis.  No evidence to suggest epidural hematoma. 2. Mild central canal stenosis and bilateral subarticular recess stenosis at   L4-5.   3. No other significant central canal stenosis or disc herniation seen. Degenerative change. Previous treatments: medications. .          Potential Aberrant Drug-Related Behavior:    No    Urine Drug Screenin2022 saliva screen consistent for Oxycodone    OARRS report:  2022 consistent     Opioid Agreement:  Date enacted:2022  Renewal date:2023    Past Medical History:   Diagnosis Date    Blood disorder     blood didn't want to clot, going to the [facility] (acquired hemophiliac)  Hypertension     Lyme disease      Past Surgical History:   Procedure Laterality Date    COLONOSCOPY      CYST REMOVAL      OTHER SURGICAL HISTORY      dental implant     Prior to Admission medications    Medication Sig Start Date End Date Taking? Authorizing Provider   mirtazapine (REMERON) 15 MG tablet take 1 tablet by mouth once daily 22  Yes Historical Provider, MD   ondansetron (ZOFRAN) 8 MG tablet take 1 tablet by mouth every 8 hours if needed for nausea and vomiting 22  Yes Historical Provider, MD   oxyCODONE-acetaminophen (PERCOCET) 7.5-325 MG per tablet Take 1 tablet by mouth 2 times daily for 30 days.  Intended supply: 30 days 2/8/22 3/10/22 Yes Dotty Graff MD   Multiple Vitamin (MULTI-VITAMIN DAILY PO) Take by mouth   Yes Historical Provider, MD   calcium carbonate (OSCAL) 500 MG TABS tablet Take 500 mg by mouth daily   Yes Historical Provider, MD   Handicap Placard [address] by Does not apply route Above patient unable to ambulate more than 200 feet without stopping to rest.  Expires: 2024  Yes Dotty Graff MD   cyclophosphamide (CYTOXAN) 50 MG CAPS capsule  12/10/21  Yes Historical Provider, MD   predniSONE (DELTASONE) 10 MG tablet take 3 tablets by mouth once daily with food 21  Yes Historical Provider, MD   valsartan-hydroCHLOROthiazide (DIOVAN-HCT) 320-12.5 MG per tablet Take 1 tablet by mouth daily   Yes Historical Provider, MD     Allergies   Allergen Reactions    Doxycycline Rash     Social History     Socioeconomic History    Marital status:      Spouse name: Not on file    Number of children: Not on file    Years of education: Not on file    Highest education level: Not on file   Occupational History    Not on file   Tobacco Use    Smoking status: Former Smoker     Packs/day: 0.50     Types: Cigarettes     Quit date:      Years since quittin.1    Smokeless tobacco: Never Used   Vaping Use    Vaping Use: Never used   Substance and Sexual Activity    Alcohol use: Yes     Comment: occasionally    Drug use: No    Sexual activity: Not on file   Other Topics Concern    Not on file   Social History Narrative    Not on file     Social Determinants of Health     Financial Resource Strain:     Difficulty of Paying Living Expenses: Not on file   Food Insecurity:     Worried About Running Out of Food in the Last Year: Not on file    Trinh of Food in the Last Year: Not on file   Transportation Needs:     Lack of Transportation (Medical): Not on file    Lack of Transportation (Non-Medical): Not on file   Physical Activity:     Days of Exercise per Week: Not on file    Minutes of Exercise per Session: Not on file   Stress:     Feeling of Stress : Not on file   Social Connections:     Frequency of Communication with Friends and Family: Not on file    Frequency of Social Gatherings with Friends and Family: Not on file    Attends Amish Services: Not on file    Active Member of [address] [place] [address] or Organizations: Not on file    Attends Club or Organization Meetings: Not on file    Marital Status: Not on file   Intimate Partner Violence:     Fear of Current or Ex-Partner: Not on file    Emotionally Abused: Not on file    Physically Abused: Not on file    Sexually Abused: Not on file   Housing Stability:     Unable to Pay for Housing in the Last Year: Not on file    Number of [place] in the Last Year: Not on file    Unstable Housing in the Last Year: Not on file     History reviewed. No pertinent family history. REVIEW OF SYSTEMS:     Janet Erazo denies fever/chills, chest pain, shortness of breath, new bowel or bladder complaints. All other review of systems was negative. PHYSICAL EXAMINATION:      BP (!) 140/86   Pulse 81   Temp 97.5 °F (36.4 °C) (Infrared)   Resp 16   Ht 5' 10\" (1.778 m)   Wt 150 lb (68 kg)   SpO2 93%   BMI 21.52 kg/m²     General:       General appearance:   elderly, pleasant and well-hydrated.    , in mild to counseled the patient not to drive or operate machinery. We have discussed that these medications will be prescribed only by one provider. We have discussed with the patient about age related risk factors and have thoroughly discussed the importance of taking these medications as prescribed. The patient verbalizes understanding. ccrnuzhat Green M.D.

## 2022-04-05 ENCOUNTER — OFFICE VISIT (OUTPATIENT)
Dept: PAIN MANAGEMENT | Age: 72
End: 2022-04-05
Payer: MEDICARE

## 2022-04-05 VITALS
TEMPERATURE: 97.5 F | RESPIRATION RATE: 16 BRPM | WEIGHT: 140 LBS | BODY MASS INDEX: 20.73 KG/M2 | HEIGHT: 69 IN | HEART RATE: 94 BPM | DIASTOLIC BLOOD PRESSURE: 74 MMHG | SYSTOLIC BLOOD PRESSURE: 118 MMHG | OXYGEN SATURATION: 98 %

## 2022-04-05 DIAGNOSIS — M47.816 LUMBAR SPONDYLOSIS: ICD-10-CM

## 2022-04-05 DIAGNOSIS — S22.080A T12 COMPRESSION FRACTURE, INITIAL ENCOUNTER (HCC): ICD-10-CM

## 2022-04-05 DIAGNOSIS — M48.061 SPINAL STENOSIS OF LUMBAR REGION WITHOUT NEUROGENIC CLAUDICATION: ICD-10-CM

## 2022-04-05 DIAGNOSIS — G89.4 CHRONIC PAIN SYNDROME: ICD-10-CM

## 2022-04-05 DIAGNOSIS — M47.816 LUMBAR FACET ARTHROPATHY: Primary | ICD-10-CM

## 2022-04-05 DIAGNOSIS — M51.9 LUMBAR DISC DISORDER: ICD-10-CM

## 2022-04-05 DIAGNOSIS — S32.010A CLOSED COMPRESSION FRACTURE OF BODY OF L1 VERTEBRA (HCC): ICD-10-CM

## 2022-04-05 PROCEDURE — G8427 DOCREV CUR MEDS BY ELIG CLIN: HCPCS | Performed by: PAIN MEDICINE

## 2022-04-05 PROCEDURE — 1123F ACP DISCUSS/DSCN MKR DOCD: CPT | Performed by: PAIN MEDICINE

## 2022-04-05 PROCEDURE — G8420 CALC BMI NORM PARAMETERS: HCPCS | Performed by: PAIN MEDICINE

## 2022-04-05 PROCEDURE — 99213 OFFICE O/P EST LOW 20 MIN: CPT | Performed by: PAIN MEDICINE

## 2022-04-05 PROCEDURE — 3017F COLORECTAL CA SCREEN DOC REV: CPT | Performed by: PAIN MEDICINE

## 2022-04-05 PROCEDURE — 1036F TOBACCO NON-USER: CPT | Performed by: PAIN MEDICINE

## 2022-04-05 PROCEDURE — 4040F PNEUMOC VAC/ADMIN/RCVD: CPT | Performed by: PAIN MEDICINE

## 2022-04-05 RX ORDER — OXYCODONE HYDROCHLORIDE AND ACETAMINOPHEN 5; 325 MG/1; MG/1
1 TABLET ORAL 2 TIMES DAILY PRN
Qty: 60 TABLET | Refills: 0 | Status: SHIPPED
Start: 2022-04-11 | End: 2022-05-10 | Stop reason: SDUPTHER

## 2022-04-05 NOTE — PROGRESS NOTES
[address] [facility], [address] [place] [address] Alan  [phone]    Follow up Note      Jelani Schirmer     Date of Visit:  2022    CC:  Patient presents for follow up   Chief Complaint   Patient presents with    Follow-up    Back Pain     lower back     HPI:    Pain is managed  Appropriate analgesia with current medications regimen: yes - fair. Change in quality of symptoms:no. Medication side effects:none. Recent diagnostic testing:none  Recent interventional procedures:none. .    He has not been on anticoagulation medications to include none. The patient  has not been on herbal supplements. The patient is not diabetic. Imaging:   Lumbar spine MRI 2022  Mild loss of height of the L1 vertebral body with edema involving the superior aspect cannot exclude acute or subacute compression fracture. 80% compression fracture of T12 which is old. Spinal stenosis at multiple levels. Lumbar spine Xray    New vertebral compression injuries at T12 and L1 as noted.  Degenerative   spondylosis.  Right-sided nephrolithiasis suggested. Lumbar spine MRI with contrast  1. Subacute severe compression fractures of T12 and L1, worse at L1.  Both   vertebral bodies show slight retropulsion with no significant central canal   stenosis.  No evidence to suggest epidural hematoma. 2. Mild central canal stenosis and bilateral subarticular recess stenosis at   L4-5.   3. No other significant central canal stenosis or disc herniation seen. Degenerative change. Previous treatments: medications. .          Potential Aberrant Drug-Related Behavior:    No    Urine Drug Screenin2022 saliva screen consistent for Oxycodone    OARRS report:  [id number] consistent     Opioid Agreement:  Renewal date:2023    Past Medical History:   Diagnosis Date    Blood disorder     blood didn't want to clot, going to the [facility] (acquired hemophiliac)    Hypertension     Lyme disease 2018     Past Surgical History:   Procedure Laterality Date    COLONOSCOPY      CYST REMOVAL      OTHER SURGICAL HISTORY      dental implant     Prior to Admission medications    Medication Sig Start Date End Date Taking? Authorizing Provider   mirtazapine (REMERON) 15 MG tablet take 1 tablet by mouth once daily 22  Yes Historical Provider, MD   oxyCODONE-acetaminophen (PERCOCET) 5-325 MG per tablet Take 1 tablet by mouth 2 times daily as needed for Pain for up to 30 days.  3/10/22 4/9/22 Yes Jose M Bernabe MD   ondansetron (ZOFRAN) 8 MG tablet take 1 tablet by mouth every 8 hours if needed for nausea and vomiting 22  Yes Historical Provider, MD   Multiple Vitamin (MULTI-VITAMIN DAILY PO) Take by mouth   Yes Historical Provider, MD   calcium carbonate (OSCAL) 500 MG TABS tablet Take 500 mg by mouth daily   Yes Historical Provider, MD   Handicap Placard [address] [facility] by Does not apply route Above patient unable to ambulate more than 200 feet without stopping to rest.  Expires: 2024  Yes Jose M Bernabe MD   cyclophosphamide (CYTOXAN) 50 MG CAPS capsule  12/10/21  Yes Historical Provider, MD   predniSONE (DELTASONE) 10 MG tablet take 3 tablets by mouth once daily with food 21  Yes Historical Provider, MD   valsartan-hydroCHLOROthiazide (DIOVAN-HCT) 320-12.5 MG per tablet Take 1 tablet by mouth daily   Yes Historical Provider, MD     Allergies   Allergen Reactions    Doxycycline Rash     Social History     Socioeconomic History    Marital status:      Spouse name: Not on file    Number of children: Not on file    Years of education: Not on file    Highest education level: Not on file   Occupational History    Not on file   Tobacco Use    Smoking status: Former Smoker     Packs/day: 0.50     Types: Cigarettes     Quit date:      Years since quittin.2    Smokeless tobacco: Never Used   Vaping Use    Vaping Use: Never used   Substance and Sexual Activity    Alcohol use: Yes     Comment: occasionally    Drug use: No    Sexual activity: Not on file   Other Topics Concern    Not on file   Social History Narrative    Not on file     Social Determinants of Health     Financial Resource Strain:     Difficulty of Paying Living Expenses: Not on file   Food Insecurity:     Worried About Running Out of Food in the Last Year: Not on file    Trinh of Food in the Last Year: Not on file   Transportation Needs:     Lack of Transportation (Medical): Not on file    Lack of Transportation (Non-Medical): Not on file   Physical Activity:     Days of Exercise per Week: Not on file    Minutes of Exercise per Session: Not on file   Stress:     Feeling of Stress : Not on file   Social Connections:     Frequency of Communication with Friends and Family: Not on file    Frequency of Social Gatherings with Friends and Family: Not on file    Attends Roman Catholic Services: Not on file    Active Member of [address] [place] [address] [employer] or Organizations: Not on file    Attends Club or Organization Meetings: Not on file    Marital Status: Not on file   Intimate Partner Violence:     Fear of Current or Ex-Partner: Not on file    Emotionally Abused: Not on file    Physically Abused: Not on file    Sexually Abused: Not on file   Housing Stability:     Unable to Pay for Housing in the Last Year: Not on file    Number of [place] in the Last Year: Not on file    Unstable Housing in the Last Year: Not on file     History reviewed. No pertinent family history. REVIEW OF SYSTEMS:     Danielle Ness denies fever/chills, chest pain, shortness of breath, new bowel or bladder complaints. All other review of systems was negative. PHYSICAL EXAMINATION:      /74   Pulse 94   Temp 97.5 °F (36.4 °C) (Infrared)   Resp 16   Ht 5' 9\" (1.753 m)   Wt 140 lb (63.5 kg)   SpO2 98%   BMI 20.67 kg/m²     General:       General appearance:   elderly, pleasant and well-hydrated.    , in mild to moderate discomfort and A & O by one provider. We have discussed with the patient about age related risk factors and have thoroughly discussed the importance of taking these medications as prescribed. The patient verbalizes understanding. eusebio Webb M.D.

## 2022-04-05 NOTE — PROGRESS NOTES
Do you currently have any of the following:    Fever: No  Headache:  No  Cough: No  Shortness of breath: No  Exposed to anyone with these symptoms: No                                                                                                                Jessie Michael presents to the [facility] on 4/5/2022. Howie Aguilar is complaining of pain lower back. The pain is constant. The pain is described as aching. Pain is rated on his best day at a 8, on his worst day at a 10, and on average at a 8 on the VAS scale. He took his last dose of Percocet . Howie Aguilar does not have issues with constipation. Any procedures since your last visit: No,      He is not on NSAIDS and  is not on anticoagulation medications to include none . Pacemaker or defibrillator: No Physician . Medication Contract and Consent for Opioid Use Documents Filed     Patient Documents     Type of Document Status Date Received Received By Description    Medication Contract Received 2/8/2022 12:19 PM SANCHEZ KENNEDY pain management                   /74   Pulse 94   Temp 97.5 °F (36.4 °C) (Infrared)   Resp 16   Ht 5' 9\" (1.753 m)   Wt 140 lb (63.5 kg)   SpO2 98%   BMI 20.67 kg/m²      No LMP for male patient.

## 2022-05-10 ENCOUNTER — SCHEDULED TELEPHONE ENCOUNTER (OUTPATIENT)
Dept: PAIN MANAGEMENT | Age: 72
End: 2022-05-10
Payer: MEDICARE

## 2022-05-10 DIAGNOSIS — M47.816 LUMBAR SPONDYLOSIS: ICD-10-CM

## 2022-05-10 DIAGNOSIS — S22.080A T12 COMPRESSION FRACTURE, INITIAL ENCOUNTER (HCC): ICD-10-CM

## 2022-05-10 DIAGNOSIS — M51.9 LUMBAR DISC DISORDER: ICD-10-CM

## 2022-05-10 DIAGNOSIS — M48.061 SPINAL STENOSIS OF LUMBAR REGION WITHOUT NEUROGENIC CLAUDICATION: ICD-10-CM

## 2022-05-10 DIAGNOSIS — G89.4 CHRONIC PAIN SYNDROME: Primary | ICD-10-CM

## 2022-05-10 DIAGNOSIS — M47.816 LUMBAR FACET ARTHROPATHY: ICD-10-CM

## 2022-05-10 DIAGNOSIS — S32.010A CLOSED COMPRESSION FRACTURE OF BODY OF L1 VERTEBRA (HCC): ICD-10-CM

## 2022-05-10 PROCEDURE — 99441 PR PHYS/QHP TELEPHONE EVALUATION 5-10 MIN: CPT | Performed by: PAIN MEDICINE

## 2022-05-10 RX ORDER — OXYCODONE HYDROCHLORIDE AND ACETAMINOPHEN 5; 325 MG/1; MG/1
1 TABLET ORAL 2 TIMES DAILY PRN
Qty: 60 TABLET | Refills: 0 | Status: SHIPPED
Start: 2022-05-11 | End: 2022-06-10 | Stop reason: SDUPTHER

## 2022-05-10 NOTE — PROGRESS NOTES
[address]  [facility], [address] [place] [address] Alan  [phone]  Telephone follow up visit      Date of Visit:  5/10/2022    CC:     Consent:  Telephone follow up due to Ricardo 19 pandemic   The patient and/or health care decision maker is aware that he/she may receive a bill for this telephone service, depending on his insurance coverage, and has provided verbal consent to proceed: Yes    I have considered the risks of abuse, dependence, addiction and diversion. My patient is aware that they will need a follow-up visit (in-person or virtually) at the appropriate time indicated for continued medications. Further, my patient is aware that when this acute crisis has lifted, they will be expected to return for an in-person visit and all elements of standard local and hospital guidelines in order to continue this medication. Patient location: Home   Physician Location:Other address in [place]    HPI:  Pain is managed  Appropriate analgesia with current medications regimen: yes - fair. Change in quality of symptoms:no. Medication side effects:none. Recent diagnostic testing:none  Recent interventional procedures:none. Umberto Estevezis not been on anticoagulation medications to include none. The patient  has not been on herbal supplements.  The patient is not diabetic. Imaging:   Lumbar spine MRI 01/2022  Mild loss of height of the L1 vertebral body with edema involving the superior aspect cannot exclude acute or subacute compression fracture. 80% compression fracture of T12 which is old. Spinal stenosis at multiple levels. Lumbar spine Xray 2022   New vertebral compression injuries at T12 and L1 as noted.  Degenerative   spondylosis.  Right-sided nephrolithiasis suggested. Lumbar spine MRI with contrast  1. Subacute severe compression fractures of T12 and L1, worse at L1.  Both   vertebral bodies show slight retropulsion with no significant central canal   stenosis.  No evidence to suggest epidural hematoma. 2. Mild central canal stenosis and bilateral subarticular recess stenosis at   L4-5.   3. No other significant central canal stenosis or disc herniation seen. Degenerative change. Previous treatments: medications. .     Potential Aberrant Drug-Related Behavior:    No    Urine Drug Screenin2022 saliva screen consistent for Oxycodone     OARRS report:  2022 consistent     Opioid Agreement:  Renewal date:2023    Past Medical History: Reviewed    Past Surgical History: Reviewed     Home Medications: Reviewed    Allergies: Reviewed     Social History: Reviewed     REVIEW OF SYSTEMS:     Sanjuana Rhodes denies fever/chills, chest pain, shortness of breath, new bowel or bladder complaints. All other review of systems was negative. PHYSICAL EXAMINATION:     General:       A & O x3    Lungs:    Breathing:Breathing Pattern: regular, no distress    Impression:    Low back pain T12 & L1 compression fracture with h/o acquired hemophilia   Currently going to [facility] for treatment of his hemophilia  Plan:  Follow up on his low back pain with no acute issues. Continue with Percocet 5/325 BID. Continue with back brace. OARRS report reviewed 2022. Patient encouraged to stay active. Treatment plan discussed with the patient including medications side effects. We discussed with the patient that combining opioids, benzodiazepines, alcohol, illicit drugs or sleep aids increases the risk of respiratory depression including death. We discussed that these medications may cause drowsiness, sedation or dizziness and have counseled the patient not to drive or operate machinery. We have discussed that these medications will be prescribed only by one provider. We have discussed with the patient about age related risk factors and have thoroughly discussed the importance of taking these medications as prescribed. The patient verbalizes understanding.     Patient advised regarding steps to help prevent the spread of COVID-19   SOURCE - https://romy-shalini.info/. html     1-Stay home except to get medical care  2-Clean your hands often for atleast 20 secnds, avoid touching: Avoid touching your eyes, nose, and mouth with unwashed hands. 3-Seek medical attention: Seek prompt medical attention if your illness is worsening (e.g., difficulty breathing). Call you doctor first.  3-Wear a facemask if you are sick   4-Cover your coughs and sneezes        I affirm this is a Patient Initiated Episode with an Established Patient who has not had a related appointment within my department in the past 7 days or scheduled within the next 24 hours.     Total Time: minutes: 5-10 minutes    Note: not billable if this call serves to triage the patient into an appointment for the relevant concern

## 2022-05-27 ENCOUNTER — HOSPITAL ENCOUNTER (OUTPATIENT)
Age: 72
Discharge: HOME | End: 2022-05-29

## 2022-05-27 PROCEDURE — 88342 IMHCHEM/IMCYTCHM 1ST ANTB: CPT

## 2022-05-27 PROCEDURE — 88305 TISSUE EXAM BY PATHOLOGIST: CPT

## 2022-06-10 ENCOUNTER — TELEPHONE (OUTPATIENT)
Dept: PAIN MANAGEMENT | Age: 72
End: 2022-06-10

## 2022-06-10 DIAGNOSIS — M48.061 SPINAL STENOSIS OF LUMBAR REGION WITHOUT NEUROGENIC CLAUDICATION: ICD-10-CM

## 2022-06-10 DIAGNOSIS — S22.080A T12 COMPRESSION FRACTURE, INITIAL ENCOUNTER (HCC): ICD-10-CM

## 2022-06-10 RX ORDER — OXYCODONE HYDROCHLORIDE AND ACETAMINOPHEN 5; 325 MG/1; MG/1
1 TABLET ORAL 2 TIMES DAILY PRN
Qty: 60 TABLET | Refills: 0 | Status: SHIPPED
Start: 2022-06-10 | End: 2022-07-15 | Stop reason: SDUPTHER

## 2022-06-10 NOTE — TELEPHONE ENCOUNTER
Mac Cooney called for a refill of Oxycodone-Acetaminophen. He will be out on June 11th. He had a telephone visit on 5-10-22  No future visit scheduled.

## 2022-07-07 ENCOUNTER — HOSPITAL ENCOUNTER (OUTPATIENT)
Age: 72
Discharge: HOME | End: 2022-07-07
Payer: MEDICARE

## 2022-07-07 LAB
BUN BLDV-MCNC: 37 MG/DL (ref 6–23)
CREAT SERPL-MCNC: 1.1 MG/DL (ref 0.7–1.2)
GFR AFRICAN AMERICAN: >60
GFR NON-AFRICAN AMERICAN: >60 ML/MIN/1.73

## 2022-07-07 PROCEDURE — 82565 ASSAY OF CREATININE: CPT

## 2022-07-07 PROCEDURE — 36415 COLL VENOUS BLD VENIPUNCTURE: CPT

## 2022-07-07 PROCEDURE — 84520 ASSAY OF UREA NITROGEN: CPT

## 2022-07-10 ENCOUNTER — HOSPITAL ENCOUNTER (OUTPATIENT)
Dept: CT IMAGING | Age: 72
Discharge: HOME | End: 2022-07-12
Payer: MEDICARE

## 2022-07-10 DIAGNOSIS — D68.9 COAGULATION DEFECT, UNSPECIFIED (HCC): ICD-10-CM

## 2022-07-10 DIAGNOSIS — D64.9 ANEMIA, UNSPECIFIED TYPE: ICD-10-CM

## 2022-07-10 DIAGNOSIS — D68.4 ACQUIRED COAGULATION FACTOR DEFICIENCY (HCC): ICD-10-CM

## 2022-07-10 PROCEDURE — 74177 CT ABD & PELVIS W/CONTRAST: CPT

## 2022-07-10 PROCEDURE — 71260 CT THORAX DX C+: CPT

## 2022-07-10 PROCEDURE — 6360000004 HC RX CONTRAST MEDICATION: Performed by: RADIOLOGY

## 2022-07-10 RX ADMIN — IOHEXOL 50 ML: 240 INJECTION, SOLUTION INTRATHECAL; INTRAVASCULAR; INTRAVENOUS; ORAL at 08:31

## 2022-07-10 RX ADMIN — IOPAMIDOL 75 ML: 755 INJECTION, SOLUTION INTRAVENOUS at 08:31

## 2022-07-15 ENCOUNTER — OFFICE VISIT (OUTPATIENT)
Dept: PAIN MANAGEMENT | Age: 72
End: 2022-07-15
Payer: MEDICARE

## 2022-07-15 VITALS
SYSTOLIC BLOOD PRESSURE: 148 MMHG | WEIGHT: 150 LBS | BODY MASS INDEX: 22.22 KG/M2 | HEIGHT: 69 IN | DIASTOLIC BLOOD PRESSURE: 70 MMHG | HEART RATE: 94 BPM | OXYGEN SATURATION: 95 % | RESPIRATION RATE: 20 BRPM | TEMPERATURE: 98.6 F

## 2022-07-15 DIAGNOSIS — M51.9 LUMBAR DISC DISORDER: ICD-10-CM

## 2022-07-15 DIAGNOSIS — M47.816 LUMBAR SPONDYLOSIS: ICD-10-CM

## 2022-07-15 DIAGNOSIS — M47.816 LUMBAR FACET ARTHROPATHY: Primary | ICD-10-CM

## 2022-07-15 DIAGNOSIS — G89.4 CHRONIC PAIN SYNDROME: ICD-10-CM

## 2022-07-15 DIAGNOSIS — S32.010A CLOSED COMPRESSION FRACTURE OF BODY OF L1 VERTEBRA (HCC): ICD-10-CM

## 2022-07-15 DIAGNOSIS — S22.080A T12 COMPRESSION FRACTURE, INITIAL ENCOUNTER (HCC): ICD-10-CM

## 2022-07-15 DIAGNOSIS — M48.061 SPINAL STENOSIS OF LUMBAR REGION WITHOUT NEUROGENIC CLAUDICATION: ICD-10-CM

## 2022-07-15 PROCEDURE — G8420 CALC BMI NORM PARAMETERS: HCPCS | Performed by: PAIN MEDICINE

## 2022-07-15 PROCEDURE — 1123F ACP DISCUSS/DSCN MKR DOCD: CPT | Performed by: PAIN MEDICINE

## 2022-07-15 PROCEDURE — 99213 OFFICE O/P EST LOW 20 MIN: CPT | Performed by: PAIN MEDICINE

## 2022-07-15 PROCEDURE — G8427 DOCREV CUR MEDS BY ELIG CLIN: HCPCS | Performed by: PAIN MEDICINE

## 2022-07-15 PROCEDURE — 1036F TOBACCO NON-USER: CPT | Performed by: PAIN MEDICINE

## 2022-07-15 PROCEDURE — 3017F COLORECTAL CA SCREEN DOC REV: CPT | Performed by: PAIN MEDICINE

## 2022-07-15 RX ORDER — MEGESTROL ACETATE 40 MG/ML
SUSPENSION ORAL
COMMUNITY
Start: 2022-07-05

## 2022-07-15 RX ORDER — OXYCODONE HYDROCHLORIDE AND ACETAMINOPHEN 5; 325 MG/1; MG/1
1 TABLET ORAL 2 TIMES DAILY PRN
Qty: 60 TABLET | Refills: 0 | Status: SHIPPED
Start: 2022-07-15 | End: 2022-08-12 | Stop reason: SDUPTHER

## 2022-07-15 RX ORDER — FUROSEMIDE 40 MG/1
TABLET ORAL
COMMUNITY
Start: 2022-07-01

## 2022-07-15 NOTE — PROGRESS NOTES
[address]  [address] [facility], [address] [place] [address]  [phone]    Follow up Note      Maximo Canavan     Date of Visit:  7/15/2022    CC:  Patient presents for follow up   No chief complaint on file. HPI:    Pain is managed  Appropriate analgesia with current medications regimen: yes - fair. Change in quality of symptoms:no. Medication side effects:none. Recent diagnostic testing:none  Recent interventional procedures:none. .    He has not been on anticoagulation medications to include none. The patient  has not been on herbal supplements. The patient is not diabetic. Imaging:   Lumbar spine MRI 2022  Mild loss of height of the L1 vertebral body with edema involving the superior aspect cannot exclude acute or subacute compression fracture. 80% compression fracture of T12 which is old. Spinal stenosis at multiple levels. Lumbar spine Xray    New vertebral compression injuries at T12 and L1 as noted. Degenerative   spondylosis. Right-sided nephrolithiasis suggested. Lumbar spine MRI with contrast  1. Subacute severe compression fractures of T12 and L1, worse at L1. Both   vertebral bodies show slight retropulsion with no significant central canal   stenosis. No evidence to suggest epidural hematoma. 2. Mild central canal stenosis and bilateral subarticular recess stenosis at   L4-5.   3. No other significant central canal stenosis or disc herniation seen. Degenerative change. Previous treatments: medications. .          Potential Aberrant Drug-Related Behavior:    No    Urine Drug Screenin2022 saliva screen consistent for Oxycodone    OARRS report:  2022 consistent     Opioid Agreement:  Renewal date:2023    Past Medical History:   Diagnosis Date    Blood disorder     blood didn't want to clot, going to the [facility] (acquired hemophiliac)    Hypertension     Lyme disease      Past Surgical History:   Procedure Laterality Date    COLONOSCOPY      CYST REMOVAL      OTHER SURGICAL HISTORY      dental implant     Prior to Admission medications    Medication Sig Start Date End Date Taking?  Authorizing Provider   OXYCODONE ER PO oxyCODONE 5/ACETAMIN 325 Active May 24th, 2022 11:37am 22  Yes Historical Provider, MD   furosemide (LASIX) 40 MG tablet take 1 tablet by mouth every morning 22   Historical Provider, MD   megestrol (MEGACE) 40 MG/ML suspension take 10 milliliters by mouth twice a day 22   Historical Provider, MD   mirtazapine (REMERON) 15 MG tablet take 1 tablet by mouth once daily 22   Historical Provider, MD   ondansetron (ZOFRAN) 8 MG tablet take 1 tablet by mouth every 8 hours if needed for nausea and vomiting 22   Historical Provider, MD   Multiple Vitamin (MULTI-VITAMIN DAILY PO) Take by mouth    Historical Provider, MD   calcium carbonate (OSCAL) 500 MG TABS tablet Take 500 mg by mouth daily    Historical Provider, MD   Handicap Placard MISC by Does not apply route Above patient unable to ambulate more than 200 feet without stopping to rest.  Expires: 2024   Aylin Jurado MD   cyclophosphamide (CYTOXAN) 50 MG CAPS capsule  12/10/21   Historical Provider, MD   valsartan-hydroCHLOROthiazide (DIOVAN-HCT) 320-12.5 MG per tablet Take 1 tablet by mouth daily  Patient not taking: Reported on 5/10/2022    Historical Provider, MD     Allergies   Allergen Reactions    Doxycycline Rash     Social History     Socioeconomic History    Marital status:      Spouse name: Not on file    Number of children: Not on file    Years of education: Not on file    Highest education level: Not on file   Occupational History    Not on file   Tobacco Use    Smoking status: Former     Packs/day: 0.50     Types: Cigarettes     Quit date:      Years since quittin.5    Smokeless tobacco: Never   Vaping Use    Vaping Use: Never used   Substance and Sexual Activity    Alcohol use: Yes     Comment: Gastrocnemius4/5  Right Ant Tibialis4/5  Left Ant Tibialis4/5  [address]     Dermatology:     Skin:no unusual rashes and no skin lesions    Exam unchanged     Impression:  Low back pain T12 & L1 compression fracture with h/o acquired hemophilia   Currently going to [facility] for treatment of his hemophilia  Plan:  Follow up on his low back pain with no acute issues. Continue with Percocet 5/325 BID. Continue with back brace. OARRS report reviewed 07/2022. Patient encouraged to stay active. Will refer patient to physical therapy. Treatment plan discussed with the patient including medications side effects. We discussed with the patient that combining opioids, benzodiazepines, alcohol, illicit drugs or sleep aids increases the risk of respiratory depression including death. We discussed that these medications may cause drowsiness, sedation or dizziness and have counseled the patient not to drive or operate machinery. We have discussed that these medications will be prescribed only by one provider. We have discussed with the patient about age related risk factors and have thoroughly discussed the importance of taking these medications as prescribed. The patient verbalizes understanding. ccrefnadeeming arvind Quintanilla M.D.

## 2022-08-12 ENCOUNTER — SCHEDULED TELEPHONE ENCOUNTER (OUTPATIENT)
Dept: PAIN MANAGEMENT | Age: 72
End: 2022-08-12
Payer: MEDICARE

## 2022-08-12 DIAGNOSIS — S22.080A T12 COMPRESSION FRACTURE, INITIAL ENCOUNTER (HCC): ICD-10-CM

## 2022-08-12 DIAGNOSIS — M48.061 SPINAL STENOSIS OF LUMBAR REGION WITHOUT NEUROGENIC CLAUDICATION: ICD-10-CM

## 2022-08-12 PROCEDURE — 99441 PR PHYS/QHP TELEPHONE EVALUATION 5-10 MIN: CPT | Performed by: PAIN MEDICINE

## 2022-08-12 RX ORDER — OXYCODONE HYDROCHLORIDE AND ACETAMINOPHEN 5; 325 MG/1; MG/1
1 TABLET ORAL 2 TIMES DAILY PRN
Qty: 60 TABLET | Refills: 0 | Status: SHIPPED
Start: 2022-08-14 | End: 2022-09-27 | Stop reason: SDUPTHER

## 2022-08-12 NOTE — PROGRESS NOTES
Henok Fabian was read the following message We want to confirm that, for purposes of billing, this is a virtual visit with your provider for which we will submit a claim for reimbursement with your insurance company. You will be responsible for any copays, coinsurance amounts or other amounts not covered by your insurance company. If you do not accept this, unfortunately we will not be able to schedule or proceed with a virtual visit with the provider. Do you accept? Evelina Dey responded Yes. He is in [place].

## 2022-08-12 NOTE — PROGRESS NOTES
[address]  [address] [facility], [address] [place] [address] Alan  [phone]  Telephone follow up visit      Date of Visit:  8/12/2022    CC:     Consent:  Telephone follow up due to Ricardo 19 pandemic   The patient and/or health care decision maker is aware that he/she may receive a bill for this telephone service, depending on his insurance coverage, and has provided verbal consent to proceed: Yes    I have considered the risks of abuse, dependence, addiction and diversion. My patient is aware that they will need a follow-up visit (in-person or virtually) at the appropriate time indicated for continued medications. Further, my patient is aware that when this acute crisis has lifted, they will be expected to return for an in-person visit and all elements of standard local and hospital guidelines in order to continue this medication. Patient location: Home   Physician Location:Other address in [address] [place] [address] Dr GUEVARA:  Pain is managed, diagnosed with kidney stone recently  Appropriate analgesia with current medications regimen: yes - fair. Change in quality of symptoms:no. Medication side effects:none. Recent diagnostic testing:none  Recent interventional procedures:none. .     He has not been on anticoagulation medications to include none. The patient  has not been on herbal supplements. The patient is not diabetic. Imaging:   Lumbar spine MRI 01/2022  Mild loss of height of the L1 vertebral body with edema involving the superior aspect cannot exclude acute or subacute compression fracture. 80% compression fracture of T12 which is old. Spinal stenosis at multiple levels. Lumbar spine Xray 2022   New vertebral compression injuries at T12 and L1 as noted. Degenerative   spondylosis. Right-sided nephrolithiasis suggested. Lumbar spine MRI with contrast  1. Subacute severe compression fractures of T12 and L1, worse at L1.   Both   vertebral bodies show slight retropulsion with no

## 2022-08-15 ENCOUNTER — HOSPITAL ENCOUNTER (OUTPATIENT)
Age: 72
Discharge: HOME | End: 2022-08-17

## 2022-08-15 PROCEDURE — 88300 SURGICAL PATH GROSS: CPT

## 2022-08-15 PROCEDURE — 82365 CALCULUS SPECTROSCOPY: CPT

## 2022-08-20 LAB
CALCULI COMPOSITION: NORMAL
MASS: 381 MG
STONE DESCRIPTION: NORMAL

## 2022-09-27 ENCOUNTER — OFFICE VISIT (OUTPATIENT)
Dept: PAIN MANAGEMENT | Age: 72
End: 2022-09-27
Payer: MEDICARE

## 2022-09-27 VITALS
BODY MASS INDEX: 21 KG/M2 | OXYGEN SATURATION: 98 % | SYSTOLIC BLOOD PRESSURE: 136 MMHG | DIASTOLIC BLOOD PRESSURE: 72 MMHG | HEART RATE: 90 BPM | HEIGHT: 71 IN | RESPIRATION RATE: 16 BRPM | TEMPERATURE: 97.1 F | WEIGHT: 150 LBS

## 2022-09-27 DIAGNOSIS — S22.080A T12 COMPRESSION FRACTURE, INITIAL ENCOUNTER (HCC): ICD-10-CM

## 2022-09-27 DIAGNOSIS — M48.061 SPINAL STENOSIS OF LUMBAR REGION WITHOUT NEUROGENIC CLAUDICATION: ICD-10-CM

## 2022-09-27 DIAGNOSIS — M51.9 LUMBAR DISC DISORDER: ICD-10-CM

## 2022-09-27 DIAGNOSIS — S32.010A CLOSED COMPRESSION FRACTURE OF BODY OF L1 VERTEBRA (HCC): ICD-10-CM

## 2022-09-27 DIAGNOSIS — M47.816 LUMBAR SPONDYLOSIS: ICD-10-CM

## 2022-09-27 DIAGNOSIS — G89.4 CHRONIC PAIN SYNDROME: Primary | ICD-10-CM

## 2022-09-27 DIAGNOSIS — M47.816 LUMBAR FACET ARTHROPATHY: ICD-10-CM

## 2022-09-27 PROCEDURE — 99213 OFFICE O/P EST LOW 20 MIN: CPT | Performed by: PAIN MEDICINE

## 2022-09-27 PROCEDURE — G8420 CALC BMI NORM PARAMETERS: HCPCS | Performed by: PAIN MEDICINE

## 2022-09-27 PROCEDURE — 1036F TOBACCO NON-USER: CPT | Performed by: PAIN MEDICINE

## 2022-09-27 PROCEDURE — G8427 DOCREV CUR MEDS BY ELIG CLIN: HCPCS | Performed by: PAIN MEDICINE

## 2022-09-27 PROCEDURE — 3017F COLORECTAL CA SCREEN DOC REV: CPT | Performed by: PAIN MEDICINE

## 2022-09-27 PROCEDURE — 1123F ACP DISCUSS/DSCN MKR DOCD: CPT | Performed by: PAIN MEDICINE

## 2022-09-27 RX ORDER — OXYCODONE HYDROCHLORIDE AND ACETAMINOPHEN 5; 325 MG/1; MG/1
1 TABLET ORAL 2 TIMES DAILY PRN
Qty: 60 TABLET | Refills: 0 | Status: SHIPPED | OUTPATIENT
Start: 2022-09-27 | End: 2022-10-27

## 2022-09-27 NOTE — PROGRESS NOTES
[address]  [address] [facility], [address] [place] [address] Alan  [phone]    Follow up Note      Cassie Cline     Date of Visit:  2022    CC:  Patient presents for follow up   Chief Complaint   Patient presents with    Follow-up     Lower back pain        HPI:  Pain is managed. Appropriate analgesia with current medications regimen: yes - fair. Change in quality of symptoms:no. Medication side effects:none. Recent diagnostic testing:none  Recent interventional procedures:none. .     He has not been on anticoagulation medications to include none. The patient  has not been on herbal supplements. The patient is not diabetic. Imaging:   Lumbar spine MRI 2022  Mild loss of height of the L1 vertebral body with edema involving the superior aspect cannot exclude acute or subacute compression fracture. 80% compression fracture of T12 which is old. Spinal stenosis at multiple levels. Lumbar spine Xray    New vertebral compression injuries at T12 and L1 as noted. Degenerative   spondylosis. Right-sided nephrolithiasis suggested. Lumbar spine MRI with contrast  1. Subacute severe compression fractures of T12 and L1, worse at L1. Both   vertebral bodies show slight retropulsion with no significant central canal   stenosis. No evidence to suggest epidural hematoma. 2. Mild central canal stenosis and bilateral subarticular recess stenosis at   L4-5.   3. No other significant central canal stenosis or disc herniation seen. Degenerative change. Previous treatments: medications. .                                   Potential Aberrant Drug-Related Behavior:    No     Urine Drug Screenin2022 saliva screen consistent for Oxycodone     OARRS report:  2022 consistent     Opioid Agreement:  Renewal date:2023    Past Medical History:   Diagnosis Date    Blood disorder     blood didn't want to clot, going to the [facility] (acquired hemophiliac) Hypertension     Lyme disease      Past Surgical History:   Procedure Laterality Date    COLONOSCOPY      CYST REMOVAL      OTHER SURGICAL HISTORY      dental implant     Prior to Admission medications    Medication Sig Start Date End Date Taking? Authorizing Provider   furosemide (LASIX) 40 MG tablet take 1 tablet by mouth every morning 22  Yes Historical Provider, MD   megestrol (MEGACE) 40 MG/ML suspension take 10 milliliters by mouth twice a day 22  Yes Historical Provider, MD   mirtazapine (REMERON) 15 MG tablet take 1 tablet by mouth once daily 22  Yes Historical Provider, MD   Multiple Vitamin (MULTI-VITAMIN DAILY PO) Take by mouth   Yes Historical Provider, MD   calcium carbonate (OSCAL) 500 MG TABS tablet Take 500 mg by mouth daily   Yes Historical Provider, MD   Handdemetrius Sukh [address] [facility] by Does not apply route Above patient unable to ambulate more than 200 feet without stopping to rest.  Expires: 2024  Yes Aquiles Yost MD   cyclophosphamide (CYTOXAN) 50 MG CAPS capsule  12/10/21  Yes Historical Provider, MD   valsartan-hydroCHLOROthiazide (DIOVAN-HCT) 320-12.5 MG per tablet Take 1 tablet by mouth in the morning.    Yes Historical Provider, MD     Allergies   Allergen Reactions    Doxycycline Rash     Social History     Socioeconomic History    Marital status:      Spouse name: Not on file    Number of children: Not on file    Years of education: Not on file    Highest education level: Not on file   Occupational History    Not on file   Tobacco Use    Smoking status: Former     Packs/day: 0.50     Types: Cigarettes     Quit date:      Years since quittin.7    Smokeless tobacco: Never   Vaping Use    Vaping Use: Never used   Substance and Sexual Activity    Alcohol use: Yes     Comment: occasionally    Drug use: No    Sexual activity: Not on file   Other Topics Concern    Not on file   Social History Narrative    Not on file     Social Determinants of Health Financial Resource Strain: Not on file   Food Insecurity: Not on file   Transportation Needs: Not on file   Physical Activity: Not on file   Stress: Not on file   Social Connections: Not on file   Intimate Partner Violence: Not on file   Housing Stability: Not on file     No family history on file. REVIEW OF SYSTEMS:     Bob Lewis denies fever/chills, chest pain, shortness of breath, new bowel or bladder complaints. All other review of systems was negative. PHYSICAL EXAMINATION:      /72   Pulse 90   Temp 97.1 °F (36.2 °C) (Infrared)   Resp 16   Ht 5' 10.8\" (1.798 m)   Wt 150 lb (68 kg)   SpO2 98%   BMI 21.04 kg/m²     General:       General appearance:   elderly, pleasant and well-hydrated. , in mild to moderate discomfort and A & O x3  Build:Normal Weight     HEENT:     Head:normocephalic and atraumatic  Sclera: icterus absent,      Lungs:     Breathing:Breathing Pattern: regular, no distress     Abdomen:     Shape:non-distended and normal  Tenderness:none     Lumbar spine:     Spine inspection:exaggerated kyphosis/wearing back brace  CVA tenderness:No   Palpation:tenderness paravertebral muscles, facet loading, left, right, positive and tenderness. Range of motion:not tested     Musculoskeletal:     Trigger points in Paraveteral:absent bilaterally  SI joint tenderness:negative right, negative left  SLR:negative right, negative left, sitting      Extremities:     Tremors:None bilaterally upper and lower  Range of motion: pain with internal rotation of hips negative.   Intact:Yes  Edema:Normal     Neurological:     Sensory:normal to light touch bilateral lower extremities  Motor:                             Right Quadriceps4/5          Left Quadriceps4/5           Right Gastrocnemius4/5    Left Gastrocnemius4/5  Right Ant Tibialis4/5  Left Ant Tibialis4/5  [address]     Dermatology:     Skin:no unusual rashes and no skin lesions     Impression:  Low back pain T12 & L1 compression fracture with h/o acquired hemophilia   Currently going to [facility] for treatment of his hemophilia  Plan:  Follow up on his low back pain with no acute issues. Continue with Percocet 5/325 BID. Continue with back brace. OARRS report reviewed 09/2022. Patient encouraged to stay active. Treatment plan discussed with the patient including medications side effects. We discussed with the patient that combining opioids, benzodiazepines, alcohol, illicit drugs or sleep aids increases the risk of respiratory depression including death. We discussed that these medications may cause drowsiness, sedation or dizziness and have counseled the patient not to drive or operate machinery. We have discussed that these medications will be prescribed only by one provider. We have discussed with the patient about age related risk factors and have thoroughly discussed the importance of taking these medications as prescribed. The patient verbalizes understanding. eusebio Melchor M.D.

## 2022-09-27 NOTE — PROGRESS NOTES
Do you currently have any of the following:    Fever: No  Headache:  No  Cough: No  Shortness of breath: No  Exposed to anyone with these symptoms: No                                                                                                                Hannah Lips presents to the [facility] on 9/27/2022. Vik Friend is complaining of pain in his lower back . The pain is constant. The pain is described as aching. Pain is rated on his best day at a 6, on his worst day at a 9, and on average at a 6 on the VAS scale. He took his last dose of Percocet 7 am . Vik Friend does not have issues with constipation. Any procedures since your last visit: No    He is  on NSAIDS and  is not on anticoagulation medications to include none   Pacemaker or defibrillator: No     Medication Contract and Consent for Opioid Use Documents Filed       Patient Documents       Type of Document Status Date Received Received By Description    Medication Contract Received 2/8/2022 12:19 PM SANCHEZ KENNEDY pain management                       There were no vitals taken for this visit. No LMP for male patient.

## 2022-11-22 ENCOUNTER — OFFICE VISIT (OUTPATIENT)
Dept: PAIN MANAGEMENT | Age: 72
End: 2022-11-22
Payer: MEDICARE

## 2022-11-22 VITALS
SYSTOLIC BLOOD PRESSURE: 116 MMHG | HEIGHT: 70 IN | OXYGEN SATURATION: 96 % | DIASTOLIC BLOOD PRESSURE: 78 MMHG | WEIGHT: 150 LBS | TEMPERATURE: 97.1 F | BODY MASS INDEX: 21.47 KG/M2 | HEART RATE: 85 BPM

## 2022-11-22 DIAGNOSIS — S22.080A T12 COMPRESSION FRACTURE, INITIAL ENCOUNTER (HCC): ICD-10-CM

## 2022-11-22 DIAGNOSIS — G89.4 CHRONIC PAIN SYNDROME: Primary | ICD-10-CM

## 2022-11-22 DIAGNOSIS — M51.9 LUMBAR DISC DISORDER: ICD-10-CM

## 2022-11-22 DIAGNOSIS — M47.816 LUMBAR SPONDYLOSIS: ICD-10-CM

## 2022-11-22 DIAGNOSIS — S32.010A CLOSED COMPRESSION FRACTURE OF BODY OF L1 VERTEBRA (HCC): ICD-10-CM

## 2022-11-22 DIAGNOSIS — M48.061 SPINAL STENOSIS OF LUMBAR REGION WITHOUT NEUROGENIC CLAUDICATION: ICD-10-CM

## 2022-11-22 DIAGNOSIS — M47.816 LUMBAR FACET ARTHROPATHY: ICD-10-CM

## 2022-11-22 PROCEDURE — G8420 CALC BMI NORM PARAMETERS: HCPCS | Performed by: PAIN MEDICINE

## 2022-11-22 PROCEDURE — G8484 FLU IMMUNIZE NO ADMIN: HCPCS | Performed by: PAIN MEDICINE

## 2022-11-22 PROCEDURE — 1123F ACP DISCUSS/DSCN MKR DOCD: CPT | Performed by: PAIN MEDICINE

## 2022-11-22 PROCEDURE — 99214 OFFICE O/P EST MOD 30 MIN: CPT | Performed by: PAIN MEDICINE

## 2022-11-22 PROCEDURE — G8427 DOCREV CUR MEDS BY ELIG CLIN: HCPCS | Performed by: PAIN MEDICINE

## 2022-11-22 PROCEDURE — 1036F TOBACCO NON-USER: CPT | Performed by: PAIN MEDICINE

## 2022-11-22 PROCEDURE — 99213 OFFICE O/P EST LOW 20 MIN: CPT | Performed by: PAIN MEDICINE

## 2022-11-22 PROCEDURE — 3017F COLORECTAL CA SCREEN DOC REV: CPT | Performed by: PAIN MEDICINE

## 2022-11-22 RX ORDER — OXYCODONE HYDROCHLORIDE AND ACETAMINOPHEN 5; 325 MG/1; MG/1
1 TABLET ORAL 2 TIMES DAILY PRN
Qty: 60 TABLET | Refills: 0 | Status: SHIPPED | OUTPATIENT
Start: 2022-11-22 | End: 2022-12-22

## 2022-11-22 NOTE — PROGRESS NOTES
Do you currently have any of the following:    Fever: No  Headache:  No  Cough: No  Shortness of breath: No  Exposed to anyone with these symptoms: No                                                                                                                Elisa Damico presents to the [address] on 11/22/2022. Kenroy Dyer is complaining of pain in back. The pain is constant. The pain is described as aching. Pain is rated on his best day at a 4, on his worst day at a 8, and on average at a 6 on the VAS scale. He took his last dose of Percocet today. Kenroy Dyer does not have issues with constipation. Any procedures since your last visit: No    He is not on NSAIDS and  is not on anticoagulation medications to include none and is managed by none. Pacemaker or defibrillator: No.    Medication Contract and Consent for Opioid Use Documents Filed       Patient Documents       Type of Document Status Date Received Received By Description    Medication Contract Received 2/8/2022 12:19 PM SANCHEZ KENNEDY pain management                       There were no vitals taken for this visit. No LMP for male patient.

## 2022-11-22 NOTE — PROGRESS NOTES
[facility]  [address] [facility], [address] [place] [address]  [phone]    Follow up Note      Gar Legacy     Date of Visit:  2022    CC:  Patient presents for follow up   Chief Complaint   Patient presents with    Back Pain       HPI:  Pain is managed. Appropriate analgesia with current medications regimen: yes - fair. Change in quality of symptoms:no. Medication side effects:none. Recent diagnostic testing:none  Recent interventional procedures:none. .     He has not been on anticoagulation medications to include none. The patient  has not been on herbal supplements. The patient is not diabetic. Imaging:   Lumbar spine MRI 2022  Mild loss of height of the L1 vertebral body with edema involving the superior aspect cannot exclude acute or subacute compression fracture. 80% compression fracture of T12 which is old. Spinal stenosis at multiple levels. Lumbar spine Xray    New vertebral compression injuries at T12 and L1 as noted. Degenerative   spondylosis. Right-sided nephrolithiasis suggested. Lumbar spine MRI with contrast  1. Subacute severe compression fractures of T12 and L1, worse at L1. Both   vertebral bodies show slight retropulsion with no significant central canal   stenosis. No evidence to suggest epidural hematoma. 2. Mild central canal stenosis and bilateral subarticular recess stenosis at   L4-5.   3. No other significant central canal stenosis or disc herniation seen. Degenerative change. Previous treatments: medications. .                                   Potential Aberrant Drug-Related Behavior:    No     Urine Drug Screenin2022 saliva screen consistent for Oxycodone     OARRS report:  2022 consistent     Opioid Agreement:  Renewal date:2023    Past Medical History:   Diagnosis Date    Blood disorder     blood didn't want to clot, going to the [facility] (acquired hemophiliac)    Hypertension     Lyme disease 2018     Past Surgical History:   Procedure Laterality Date    COLONOSCOPY      CYST REMOVAL      OTHER SURGICAL HISTORY      dental implant     Prior to Admission medications    Medication Sig Start Date End Date Taking? Authorizing Provider   furosemide (LASIX) 40 MG tablet take 1 tablet by mouth every morning 22  Yes Historical Provider, MD   megestrol (MEGACE) 40 MG/ML suspension take 10 milliliters by mouth twice a day 22  Yes Historical Provider, MD   mirtazapine (REMERON) 15 MG tablet take 1 tablet by mouth once daily 22  Yes Historical Provider, MD   Multiple Vitamin (MULTI-VITAMIN DAILY PO) Take by mouth   Yes Historical Provider, MD   calcium carbonate (OSCAL) 500 MG TABS tablet Take 500 mg by mouth daily   Yes Historical Provider, MD   [address]us Sukh [address] [facility] by Does not apply route Above patient unable to ambulate more than 200 feet without stopping to rest.  Expires: 2024  Yes Tori Murguia MD   cyclophosphamide (CYTOXAN) 50 MG CAPS capsule  12/10/21  Yes Historical Provider, MD   valsartan-hydroCHLOROthiazide (DIOVAN-HCT) 320-12.5 MG per tablet Take 1 tablet by mouth in the morning.    Yes Historical Provider, MD     Allergies   Allergen Reactions    Doxycycline Rash     Social History     Socioeconomic History    Marital status:      Spouse name: Not on file    Number of children: Not on file    Years of education: Not on file    Highest education level: Not on file   Occupational History    Not on file   Tobacco Use    Smoking status: Former     Packs/day: 0.50     Types: Cigarettes     Quit date:      Years since quittin.8    Smokeless tobacco: Never   Vaping Use    Vaping Use: Never used   Substance and Sexual Activity    Alcohol use: Yes     Comment: occasionally    Drug use: No    Sexual activity: Not on file   Other Topics Concern    Not on file   Social History Narrative    Not on file     Social Determinants of Health     Financial Resource Strain: Not on file   Food Insecurity: Not on file   Transportation Needs: Not on file   Physical Activity: Not on file   Stress: Not on file   Social Connections: Not on file   Intimate Partner Violence: Not on file   Housing Stability: Not on file     No family history on file. REVIEW OF SYSTEMS:     Lois Clemente denies fever/chills, chest pain, shortness of breath, new bowel or bladder complaints. All other review of systems was negative. PHYSICAL EXAMINATION:      /78   Pulse 85   Temp 97.1 °F (36.2 °C) (Infrared)   Ht 5' 10\" (1.778 m)   Wt 150 lb (68 kg)   SpO2 96%   BMI 21.52 kg/m²     General:       General appearance:   elderly, pleasant and well-hydrated. , in mild to moderate discomfort and A & O x3  Build:Normal Weight     HEENT:     Head:normocephalic and atraumatic  Sclera: icterus absent,      Lungs:     Breathing:Breathing Pattern: regular, no distress     Abdomen:     Shape:non-distended and normal  Tenderness:none     Lumbar spine:     Spine inspection:exaggerated kyphosis. CVA tenderness:No   Palpation:tenderness paravertebral muscles, facet loading, left, right, positive and tenderness. Range of motion:not tested     Musculoskeletal:     Trigger points in Paraveteral:absent bilaterally  SI joint tenderness:negative right, negative left  SLR:negative right, negative left, sitting      Extremities:     Tremors:None bilaterally upper and lower  Range of motion: pain with internal rotation of hips negative.   Intact:Yes  Edema:Normal     Neurological:     Sensory:normal to light touch bilateral lower extremities  Motor:                             Right Quadriceps4/5          Left Quadriceps4/5           Right Gastrocnemius4/5    Left Gastrocnemius4/5  Right Ant Tibialis4/5  Left Ant Tibialis4/5  [address]     Dermatology:     Skin:no unusual rashes and no skin lesions     Impression:  Low back pain T12 & L1 compression fracture with h/o acquired hemophilia   Currently going to [facility] for treatment of his hemophilia  Plan:  Follow up on his low back pain with no acute issues. Continue with Percocet 5/325 BID(maintaining daily activities with no signs of abuse or diversion. OARRS report reviewed 11/2022/opioid agreement up to date. Patient encouraged to stay active. Treatment plan discussed with the patient including medications side effects. We discussed with the patient that combining opioids, benzodiazepines, alcohol, illicit drugs or sleep aids increases the risk of respiratory depression including death. We discussed that these medications may cause drowsiness, sedation or dizziness and have counseled the patient not to drive or operate machinery. We have discussed that these medications will be prescribed only by one provider. We have discussed with the patient about age related risk factors and have thoroughly discussed the importance of taking these medications as prescribed. The patient verbalizes understanding. ccrefsaúl Rose M.D.

## 2023-01-18 ENCOUNTER — OFFICE VISIT (OUTPATIENT)
Dept: PAIN MANAGEMENT | Age: 73
End: 2023-01-18
Payer: MEDICARE

## 2023-01-18 VITALS
HEIGHT: 68 IN | OXYGEN SATURATION: 93 % | BODY MASS INDEX: 24.25 KG/M2 | WEIGHT: 160 LBS | SYSTOLIC BLOOD PRESSURE: 132 MMHG | TEMPERATURE: 97.1 F | HEART RATE: 70 BPM | DIASTOLIC BLOOD PRESSURE: 70 MMHG | RESPIRATION RATE: 18 BRPM

## 2023-01-18 DIAGNOSIS — M48.061 SPINAL STENOSIS OF LUMBAR REGION WITHOUT NEUROGENIC CLAUDICATION: ICD-10-CM

## 2023-01-18 DIAGNOSIS — S22.080A T12 COMPRESSION FRACTURE, INITIAL ENCOUNTER (HCC): ICD-10-CM

## 2023-01-18 DIAGNOSIS — M47.816 LUMBAR SPONDYLOSIS: ICD-10-CM

## 2023-01-18 DIAGNOSIS — M51.9 LUMBAR DISC DISORDER: ICD-10-CM

## 2023-01-18 DIAGNOSIS — G89.4 CHRONIC PAIN SYNDROME: Primary | ICD-10-CM

## 2023-01-18 DIAGNOSIS — M47.816 LUMBAR FACET ARTHROPATHY: ICD-10-CM

## 2023-01-18 DIAGNOSIS — S32.010A CLOSED COMPRESSION FRACTURE OF BODY OF L1 VERTEBRA (HCC): ICD-10-CM

## 2023-01-18 PROCEDURE — G8484 FLU IMMUNIZE NO ADMIN: HCPCS | Performed by: PAIN MEDICINE

## 2023-01-18 PROCEDURE — 99214 OFFICE O/P EST MOD 30 MIN: CPT | Performed by: PAIN MEDICINE

## 2023-01-18 PROCEDURE — 99213 OFFICE O/P EST LOW 20 MIN: CPT | Performed by: PAIN MEDICINE

## 2023-01-18 PROCEDURE — 1036F TOBACCO NON-USER: CPT | Performed by: PAIN MEDICINE

## 2023-01-18 PROCEDURE — G8420 CALC BMI NORM PARAMETERS: HCPCS | Performed by: PAIN MEDICINE

## 2023-01-18 PROCEDURE — 1123F ACP DISCUSS/DSCN MKR DOCD: CPT | Performed by: PAIN MEDICINE

## 2023-01-18 PROCEDURE — G8427 DOCREV CUR MEDS BY ELIG CLIN: HCPCS | Performed by: PAIN MEDICINE

## 2023-01-18 PROCEDURE — 3017F COLORECTAL CA SCREEN DOC REV: CPT | Performed by: PAIN MEDICINE

## 2023-01-18 RX ORDER — OXYCODONE HYDROCHLORIDE AND ACETAMINOPHEN 5; 325 MG/1; MG/1
1 TABLET ORAL 2 TIMES DAILY PRN
Qty: 60 TABLET | Refills: 0 | Status: SHIPPED | OUTPATIENT
Start: 2023-01-18 | End: 2023-02-17

## 2023-01-18 NOTE — PROGRESS NOTES
Do you currently have any of the following:    Fever: No  Headache:  No  Cough: No  Shortness of breath: No  Exposed to anyone with these symptoms: No                                                                                                                Marge Al presents to the [facility] on 1/18/2023. Marleny Robertson is complaining of pain in his back. The pain is constant. The pain is described as aching. Pain is rated on his best day at a 5, on his worst day at a 8, and on average at a 6 on the VAS scale. He took his last dose of Tylenol and percocet today. Marleny Robertson does not have issues with constipation. Any procedures since your last visit: No,     He is not on NSAIDS and  is not on anticoagulation medications to include none and is managed by NA. Pacemaker or defibrillator: No Physician managing device is NA. Medication Contract and Consent for Opioid Use Documents Filed       Patient Documents       Type of Document Status Date Received Received By Description    Medication Contract Received 2/8/2022 12:19 PM SANCHEZ KENNEDY pain management                       /70   Temp 97.1 °F (36.2 °C) (Infrared)   Resp 18   Ht 5' 8\" (1.727 m)   Wt 160 lb (72.6 kg)   BMI 24.33 kg/m²      No LMP for male patient.

## 2023-01-18 NOTE — PROGRESS NOTES
[facility]   [facility], [place] [address]  [phone]    Follow up Note      Lam Summers     Date of Visit:  2023    CC:  Patient presents for follow up   Chief Complaint   Patient presents with    Back Pain       HPI:  Pain is managed.  Appropriate analgesia with current medications regimen: yes - fair.    Change in quality of symptoms:no.    Medication side effects:none.  Recent diagnostic testing:none  Recent interventional procedures:none..     He has not been on anticoagulation medications to include none. The patient  has not been on herbal supplements.  The patient is not diabetic.  Imaging:   Lumbar spine MRI 2022  Mild loss of height of the L1 vertebral body with edema involving the superior aspect cannot exclude acute or subacute compression fracture.  80% compression fracture of T12 which is old.  Spinal stenosis at multiple levels.     Lumbar spine Xray    New vertebral compression injuries at T12 and L1 as noted.  Degenerative   spondylosis.  Right-sided nephrolithiasis suggested.      Lumbar spine MRI with contrast  1. Subacute severe compression fractures of T12 and L1, worse at L1.  Both   vertebral bodies show slight retropulsion with no significant central canal   stenosis.  No evidence to suggest epidural hematoma.   2. Mild central canal stenosis and bilateral subarticular recess stenosis at   L4-5.   3. No other significant central canal stenosis or disc herniation seen.   Degenerative change.      Previous treatments: medications..                                   Potential Aberrant Drug-Related Behavior:    No     Urine Drug Screenin2022 saliva screen consistent for Oxycodone     OARRS report:  2023 consistent     Opioid Agreement:  Renewal date:2024    Past Medical History:   Diagnosis Date    Blood disorder     blood didn't want to clot, going to the [facility] (acquired hemophiliac)    Hypertension     Lyme  disease 2018     Past Surgical History:   Procedure Laterality Date    COLONOSCOPY      CYST REMOVAL      OTHER SURGICAL HISTORY      dental implant     Prior to Admission medications    Medication Sig Start Date End Date Taking? Authorizing Provider   megestrol (MEGACE) 40 MG/ML suspension take 10 milliliters by mouth twice a day 22  Yes Historical Provider, MD   calcium carbonate (OSCAL) 500 MG TABS tablet Take 500 mg by mouth daily   Yes Historical Provider, MD   Handicap [address] [facility] by Does not apply route Above patient unable to ambulate more than 200 feet without stopping to rest.  Expires: 2024  Yes Angela Watkins MD   valsartan-hydroCHLOROthiazide (DIOVAN-HCT) 320-12.5 MG per tablet Take 1 tablet by mouth in the morning.    Yes Historical Provider, MD   furosemide (LASIX) 40 MG tablet take 1 tablet by mouth every morning  Patient not taking: Reported on 2023   Historical Provider, MD   mirtazapine (REMERON) 15 MG tablet take 1 tablet by mouth once daily  Patient not taking: Reported on 2023   Historical Provider, MD   Multiple Vitamin (MULTI-VITAMIN DAILY PO) Take by mouth  Patient not taking: Reported on 2023    Historical Provider, MD   cyclophosphamide (CYTOXAN) 50 MG CAPS capsule  12/10/21   Historical Provider, MD     Allergies   Allergen Reactions    Doxycycline Rash     Social History     Socioeconomic History    Marital status:      Spouse name: Not on file    Number of children: Not on file    Years of education: Not on file    Highest education level: Not on file   Occupational History    Not on file   Tobacco Use    Smoking status: Former     Packs/day: 0.50     Types: Cigarettes     Quit date:      Years since quittin.0    Smokeless tobacco: Never   Vaping Use    Vaping Use: Never used   Substance and Sexual Activity    Alcohol use: Yes     Comment: occasionally    Drug use: No    Sexual activity: Not on file   Other Topics Concern Not on file   Social History Narrative    Not on file     Social Determinants of Health     Financial Resource Strain: Not on file   Food Insecurity: Not on file   Transportation Needs: Not on file   Physical Activity: Not on file   Stress: Not on file   Social Connections: Not on file   Intimate Partner Violence: Not on file   Housing Stability: Not on file     History reviewed. No pertinent family history. REVIEW OF SYSTEMS:     Howie Aguilar denies fever/chills, chest pain, shortness of breath, new bowel or bladder complaints. All other review of systems was negative. PHYSICAL EXAMINATION:      /70   Pulse 70   Temp 97.1 °F (36.2 °C) (Infrared)   Resp 18   Ht 5' 8\" (1.727 m)   Wt 160 lb (72.6 kg)   SpO2 93%   BMI 24.33 kg/m²     General:       General appearance:   elderly, pleasant and well-hydrated. , in mild to moderate discomfort and A & O x3  Build:Normal Weight     HEENT:     Head:normocephalic and atraumatic  Sclera: icterus absent,      Lungs:     Breathing:Breathing Pattern: regular, no distress     Abdomen:     Shape:non-distended and normal  Tenderness:none     Lumbar spine:     Spine inspection:exaggerated kyphosis. CVA tenderness:No   Palpation:tenderness paravertebral muscles, facet loading, left, right, positive and tenderness. Range of motion:not tested     Musculoskeletal:     Trigger points in Paraveteral:absent bilaterally  SI joint tenderness:negative right, negative left  SLR:negative right, negative left, sitting      Extremities:     Tremors:None bilaterally upper and lower  Range of motion: pain with internal rotation of hips negative.   Intact:Yes  Edema:Normal     Neurological:     Sensory:normal to light touch bilateral lower extremities  Motor:                             Right Quadriceps4/5          Left Quadriceps4/5           Right Gastrocnemius4/5    Left Gastrocnemius4/5  Right Ant Tibialis4/5  Left Ant Tibialis4/5  Gait:antalgic     Dermatology:     Skin:no unusual rashes and no skin lesions     Impression:  Low back pain T12 & L1 compression fracture with h/o acquired hemophilia   Currently going to [address] [facility] for treatment of his hemophilia  Plan:  Follow up on his low back pain with no acute issues. Continue with Percocet 5/325 BID(maintaining daily activities with no signs of abuse or diversion). OARRS report reviewed 01/2023/opioid agreement today. Saliva screen today. Patient encouraged to stay active. Treatment plan discussed with the patient including medications side effects. We discussed with the patient that combining opioids, benzodiazepines, alcohol, illicit drugs or sleep aids increases the risk of respiratory depression including death. We discussed that these medications may cause drowsiness, sedation or dizziness and have counseled the patient not to drive or operate machinery. We have discussed that these medications will be prescribed only by one provider. We have discussed with the patient about age related risk factors and have thoroughly discussed the importance of taking these medications as prescribed. The patient verbalizes understanding. ccrefnadeeming arvind Nowak M.D.

## 2023-03-15 ENCOUNTER — OFFICE VISIT (OUTPATIENT)
Dept: PAIN MANAGEMENT | Age: 73
End: 2023-03-15
Payer: MEDICARE

## 2023-03-15 VITALS
RESPIRATION RATE: 18 BRPM | OXYGEN SATURATION: 98 % | HEIGHT: 69 IN | HEART RATE: 70 BPM | TEMPERATURE: 97.1 F | SYSTOLIC BLOOD PRESSURE: 158 MMHG | DIASTOLIC BLOOD PRESSURE: 84 MMHG | WEIGHT: 165 LBS | BODY MASS INDEX: 24.44 KG/M2

## 2023-03-15 DIAGNOSIS — M47.816 LUMBAR FACET ARTHROPATHY: ICD-10-CM

## 2023-03-15 DIAGNOSIS — M48.061 SPINAL STENOSIS OF LUMBAR REGION WITHOUT NEUROGENIC CLAUDICATION: ICD-10-CM

## 2023-03-15 DIAGNOSIS — G89.4 CHRONIC PAIN SYNDROME: Primary | ICD-10-CM

## 2023-03-15 DIAGNOSIS — M47.816 LUMBAR SPONDYLOSIS: ICD-10-CM

## 2023-03-15 DIAGNOSIS — M51.9 LUMBAR DISC DISORDER: ICD-10-CM

## 2023-03-15 DIAGNOSIS — S32.010A CLOSED COMPRESSION FRACTURE OF BODY OF L1 VERTEBRA (HCC): ICD-10-CM

## 2023-03-15 DIAGNOSIS — S22.080A T12 COMPRESSION FRACTURE, INITIAL ENCOUNTER (HCC): ICD-10-CM

## 2023-03-15 PROCEDURE — 3017F COLORECTAL CA SCREEN DOC REV: CPT | Performed by: PAIN MEDICINE

## 2023-03-15 PROCEDURE — 99214 OFFICE O/P EST MOD 30 MIN: CPT | Performed by: PAIN MEDICINE

## 2023-03-15 PROCEDURE — 1123F ACP DISCUSS/DSCN MKR DOCD: CPT | Performed by: PAIN MEDICINE

## 2023-03-15 PROCEDURE — 99213 OFFICE O/P EST LOW 20 MIN: CPT | Performed by: PAIN MEDICINE

## 2023-03-15 PROCEDURE — 1036F TOBACCO NON-USER: CPT | Performed by: PAIN MEDICINE

## 2023-03-15 PROCEDURE — G8484 FLU IMMUNIZE NO ADMIN: HCPCS | Performed by: PAIN MEDICINE

## 2023-03-15 PROCEDURE — G8420 CALC BMI NORM PARAMETERS: HCPCS | Performed by: PAIN MEDICINE

## 2023-03-15 PROCEDURE — G8427 DOCREV CUR MEDS BY ELIG CLIN: HCPCS | Performed by: PAIN MEDICINE

## 2023-03-15 RX ORDER — OXYCODONE HYDROCHLORIDE AND ACETAMINOPHEN 5; 325 MG/1; MG/1
1 TABLET ORAL 2 TIMES DAILY PRN
Qty: 60 TABLET | Refills: 0 | Status: SHIPPED | OUTPATIENT
Start: 2023-03-15 | End: 2023-04-14

## 2023-03-15 NOTE — PROGRESS NOTES
[facility]  [address] [facility], [address] [place] [address]  [phone]    Follow up Note      [place] Child     Date of Visit:  3/15/2023    CC:  Patient presents for follow up   Chief Complaint   Patient presents with    Lower Back Pain       HPI:  Pain is managed. Appropriate analgesia with current medications regimen: yes - fair. Change in quality of symptoms:no. Medication side effects:none. Recent diagnostic testing:none  Recent interventional procedures:none. .     He has not been on anticoagulation medications to include none. The patient  has not been on herbal supplements. The patient is not diabetic. Imaging:   Lumbar spine MRI 2022  Mild loss of height of the L1 vertebral body with edema involving the superior aspect cannot exclude acute or subacute compression fracture. 80% compression fracture of T12 which is old. Spinal stenosis at multiple levels. Lumbar spine Xray    New vertebral compression injuries at T12 and L1 as noted. Degenerative   spondylosis. Right-sided nephrolithiasis suggested. Lumbar spine MRI with contrast  1. Subacute severe compression fractures of T12 and L1, worse at L1. Both   vertebral bodies show slight retropulsion with no significant central canal   stenosis. No evidence to suggest epidural hematoma. 2. Mild central canal stenosis and bilateral subarticular recess stenosis at   L4-5.   3. No other significant central canal stenosis or disc herniation seen. Degenerative change. Previous treatments: medications. .                                   Potential Aberrant Drug-Related Behavior:    No     Urine Drug Screenin2022 saliva screen consistent for Oxycodone  2023 saliva screen consistent for Oxycodone.      OARRS report:  2023 consistent     Opioid Agreement:  Renewal date:2024    Past Medical History:   Diagnosis Date    Blood disorder     blood didn't want to clot, going to the [facility] (acquired hemophiliac)    Hypertension     Lyme disease      Past Surgical History:   Procedure Laterality Date    COLONOSCOPY      CYST REMOVAL      OTHER SURGICAL HISTORY      dental implant     Prior to Admission medications    Medication Sig Start Date End Date Taking? Authorizing Provider   OXYCODONE ER PO oxyCODONE 5/ACETAMIN 325 Active May 24th, 2022 11:37am 22  Yes Historical Provider, MD   megestrol (MEGACE) 40 MG/ML suspension take 10 milliliters by mouth twice a day 22  Yes Historical Provider, MD   Multiple Vitamin (MULTI-VITAMIN DAILY PO) Take by mouth   Yes Historical Provider, MD   calcium carbonate (OSCAL) 500 MG TABS tablet Take 500 mg by mouth daily   Yes Historical Provider, MD   Handicap [address] [facility] by Does not apply route Above patient unable to ambulate more than 200 feet without stopping to rest.  Expires: 2024  Yes Inocente Perales MD   valsartan-hydroCHLOROthiazide (DIOVAN-HCT) 320-12.5 MG per tablet Take 1 tablet by mouth in the morning.    Yes Historical Provider, MD   furosemide (LASIX) 40 MG tablet take 1 tablet by mouth every morning  Patient not taking: No sig reported 22   Historical Provider, MD   mirtazapine (REMERON) 15 MG tablet take 1 tablet by mouth once daily  Patient not taking: No sig reported 22   Historical Provider, MD   cyclophosphamide (CYTOXAN) 50 MG CAPS capsule  12/10/21   Historical Provider, MD     Allergies   Allergen Reactions    Doxycycline Rash     Social History     Socioeconomic History    Marital status:      Spouse name: Not on file    Number of children: Not on file    Years of education: Not on file    Highest education level: Not on file   Occupational History    Not on file   Tobacco Use    Smoking status: Former     Packs/day: 0.50     Types: Cigarettes     Quit date:      Years since quittin.2    Smokeless tobacco: Never   Vaping Use    Vaping Use: Never used   Substance and Sexual Activity Alcohol use: Yes     Comment: occasionally    Drug use: No    Sexual activity: Not on file   Other Topics Concern    Not on file   Social History Narrative    Not on file     Social Determinants of Health     Financial Resource Strain: Not on file   Food Insecurity: Not on file   Transportation Needs: Not on file   Physical Activity: Not on file   Stress: Not on file   Social Connections: Not on file   Intimate Partner Violence: Not on file   Housing Stability: Not on file     No family history on file.    REVIEW OF SYSTEMS:     Lam denies fever/chills, chest pain, shortness of breath, new bowel or bladder complaints. All other review of systems was negative.    PHYSICAL EXAMINATION:      BP (!) 158/84   Pulse 70   Temp 97.1 °F (36.2 °C) (Infrared)   Resp 18   Ht 5' 9\" (1.753 m)   Wt 165 lb (74.8 kg)   SpO2 98%   BMI 24.37 kg/m²     General:       General appearance:   elderly, pleasant and well-hydrated.   , in mild to moderate discomfort and A & O x3  Build:Normal Weight     HEENT:     Head:normocephalic and atraumatic  Sclera: icterus absent,      Lungs:     Breathing:Breathing Pattern: regular, no distress     Abdomen:     Shape:non-distended and normal  Tenderness:none     Lumbar spine:     Spine inspection:exaggerated kyphosis.  CVA tenderness:No   Palpation:tenderness paravertebral muscles, facet loading, left, right, positive and tenderness.  Range of motion:not tested     Musculoskeletal:     Trigger points in Paraveteral:absent bilaterally  SI joint tenderness:negative right, negative left  SLR:negative right, negative left, sitting      Extremities:     Tremors:None bilaterally upper and lower  Range of motion: pain with internal rotation of hips negative.  Intact:Yes  Edema:Normal     Neurological:     Sensory:normal to light touch bilateral lower extremities  Motor:                             Right Quadriceps4/5          Left Quadriceps4/5           Right Gastrocnemius4/5    Left  Gastrocnemius4/5  Right Ant Tibialis4/5  Left Ant Tibialis4/5  [address]     Dermatology:     Skin:no unusual rashes and no skin lesions     Impression:  Low back pain T12 & L1 compression fracture with h/o acquired hemophilia   Currently going to [facility] [facility] for treatment of his hemophilia  Plan:  Follow up on his low back pain with no acute issues. Continue with Percocet 5/325 BID(maintaining daily activities with no signs of abuse or diversion). OARRS report reviewed 03/2023/opioid agreement today. Reviewed last office visit saliva screen. Patient encouraged to stay active. Treatment plan discussed with the patient including medications side effects. We discussed with the patient that combining opioids, benzodiazepines, alcohol, illicit drugs or sleep aids increases the risk of respiratory depression including death. We discussed that these medications may cause drowsiness, sedation or dizziness and have counseled the patient not to drive or operate machinery. We have discussed that these medications will be prescribed only by one provider. We have discussed with the patient about age related risk factors and have thoroughly discussed the importance of taking these medications as prescribed. The patient verbalizes understanding. eusebio Chavez M.D.

## 2023-03-15 NOTE — PROGRESS NOTES
Do you currently have any of the following:    Fever: No  Headache:  No  Cough: No  Shortness of breath: No  Exposed to anyone with these symptoms: No                                                                                                                Elgin Teague presents to the [facility] on 3/15/2023. Toribio Huffman is complaining of pain in his lower back. The pain is constant. The pain is described as aching. Pain is rated on his best day at a 5, on his worst day at a 8, and on average at a 7 on the VAS scale. He took his last dose of Tylenol and oxycodone today. Toribio Huffman does not have issues with constipation. Any procedures since your last visit: No,      He is not on NSAIDS and  is not on anticoagulation medications to include none and is managed by NA. Pacemaker or defibrillator: No Physician managing device is NA. Medication Contract and Consent for Opioid Use Documents Filed       Patient Documents       Type of Document Status Date Received Received By Description    Medication Contract Received 2/8/2022 12:19 PM SANCHEZ KENNEDY pain management    Medication Contract Received 1/18/2023 11:06 AM SHAHID MARQUIS Pain Mgmt Patient Agreement 1.18.23                       Temp 97.1 °F (36.2 °C) (Infrared)   Resp 18   Ht 5' 9\" (1.753 m)   Wt 165 lb (74.8 kg)   BMI 24.37 kg/m²      No LMP for male patient.

## 2023-05-17 ENCOUNTER — OFFICE VISIT (OUTPATIENT)
Dept: PAIN MANAGEMENT | Age: 73
End: 2023-05-17
Payer: MEDICARE

## 2023-05-17 VITALS
WEIGHT: 165 LBS | SYSTOLIC BLOOD PRESSURE: 144 MMHG | OXYGEN SATURATION: 94 % | DIASTOLIC BLOOD PRESSURE: 88 MMHG | HEIGHT: 69 IN | BODY MASS INDEX: 24.44 KG/M2 | TEMPERATURE: 97.7 F | RESPIRATION RATE: 18 BRPM | HEART RATE: 104 BPM

## 2023-05-17 DIAGNOSIS — M48.061 SPINAL STENOSIS OF LUMBAR REGION WITHOUT NEUROGENIC CLAUDICATION: ICD-10-CM

## 2023-05-17 DIAGNOSIS — M51.9 LUMBAR DISC DISORDER: ICD-10-CM

## 2023-05-17 DIAGNOSIS — S22.080A T12 COMPRESSION FRACTURE, INITIAL ENCOUNTER (HCC): ICD-10-CM

## 2023-05-17 DIAGNOSIS — S32.010A CLOSED COMPRESSION FRACTURE OF BODY OF L1 VERTEBRA (HCC): ICD-10-CM

## 2023-05-17 DIAGNOSIS — M47.816 LUMBAR FACET ARTHROPATHY: ICD-10-CM

## 2023-05-17 DIAGNOSIS — G89.4 CHRONIC PAIN SYNDROME: Primary | ICD-10-CM

## 2023-05-17 DIAGNOSIS — M47.816 LUMBAR SPONDYLOSIS: ICD-10-CM

## 2023-05-17 PROCEDURE — 99213 OFFICE O/P EST LOW 20 MIN: CPT | Performed by: PAIN MEDICINE

## 2023-05-17 PROCEDURE — 99214 OFFICE O/P EST MOD 30 MIN: CPT | Performed by: PAIN MEDICINE

## 2023-05-17 PROCEDURE — G8420 CALC BMI NORM PARAMETERS: HCPCS | Performed by: PAIN MEDICINE

## 2023-05-17 PROCEDURE — 1036F TOBACCO NON-USER: CPT | Performed by: PAIN MEDICINE

## 2023-05-17 PROCEDURE — G8427 DOCREV CUR MEDS BY ELIG CLIN: HCPCS | Performed by: PAIN MEDICINE

## 2023-05-17 PROCEDURE — 1123F ACP DISCUSS/DSCN MKR DOCD: CPT | Performed by: PAIN MEDICINE

## 2023-05-17 PROCEDURE — 3017F COLORECTAL CA SCREEN DOC REV: CPT | Performed by: PAIN MEDICINE

## 2023-05-17 RX ORDER — OXYCODONE HYDROCHLORIDE AND ACETAMINOPHEN 5; 325 MG/1; MG/1
1 TABLET ORAL 2 TIMES DAILY PRN
Qty: 60 TABLET | Refills: 0 | Status: SHIPPED | OUTPATIENT
Start: 2023-05-17 | End: 2023-06-16

## 2023-05-17 NOTE — PROGRESS NOTES
Kayla Lopez presents to the [facility] on 5/17/2023. Allen Shrestha is complaining of pain in his lower back. The pain is constant. The pain is described as aching and sharp. Pain is rated on his best day at a 5, on his worst day at a 8, and on average at a 7 on the VAS scale. He took his last dose of tylenol and Percocet today. Allen Shrestha does not have issues with constipation. Any procedures since your last visit: No,       He is not on NSAIDS and  is not on anticoagulation medications to include none and is managed by NA. Pacemaker or defibrillator: No Physician managing device is NA. Medication Contract and Consent for Opioid Use Documents Filed       Patient Documents       Type of Document Status Date Received Received By Description    Medication Contract Received 2/8/2022 12:19 PM SANCHEZ KENNEDY pain management    Medication Contract Received 1/18/2023 11:06 AM SHAHID MARQUIS Pain Mgmt Patient Agreement 1.18.23                       Temp 97.7 °F (36.5 °C) (Infrared)   Resp 18   Ht 5' 9\" (1.753 m)   Wt 165 lb (74.8 kg)   BMI 24.37 kg/m²      No LMP for male patient.
Left Gastrocnemius4/5  Right Ant Tibialis4/5  Left Ant Tibialis4/5  [address]     Dermatology:     Skin:no unusual rashes and no skin lesions     Impression:  Low back pain T12 & L1 compression fracture with h/o acquired hemophilia   Currently going to [facility] for treatment of his hemophilia  Plan:  Follow up on his low back pain with no acute issues. Continue with Percocet 5/325 BID(maintaining daily activities with no signs of abuse or diversion). OARRS report reviewed 03/2023/opioid agreement up to date. Patient encouraged to stay active. Treatment plan discussed with the patient including medications side effects. We discussed with the patient that combining opioids, benzodiazepines, alcohol, illicit drugs or sleep aids increases the risk of respiratory depression including death. We discussed that these medications may cause drowsiness, sedation or dizziness and have counseled the patient not to drive or operate machinery. We have discussed that these medications will be prescribed only by one provider. We have discussed with the patient about age related risk factors and have thoroughly discussed the importance of taking these medications as prescribed. The patient verbalizes understanding. ccrefnadeeming arvind Lima M.D.

## 2023-07-14 ENCOUNTER — OFFICE VISIT (OUTPATIENT)
Dept: PAIN MANAGEMENT | Age: 73
End: 2023-07-14
Payer: MEDICARE

## 2023-07-14 VITALS
HEIGHT: 69 IN | DIASTOLIC BLOOD PRESSURE: 84 MMHG | RESPIRATION RATE: 18 BRPM | OXYGEN SATURATION: 98 % | TEMPERATURE: 97.5 F | WEIGHT: 165 LBS | SYSTOLIC BLOOD PRESSURE: 128 MMHG | BODY MASS INDEX: 24.44 KG/M2 | HEART RATE: 65 BPM

## 2023-07-14 DIAGNOSIS — G89.4 CHRONIC PAIN SYNDROME: Primary | ICD-10-CM

## 2023-07-14 DIAGNOSIS — M48.061 SPINAL STENOSIS OF LUMBAR REGION WITHOUT NEUROGENIC CLAUDICATION: ICD-10-CM

## 2023-07-14 DIAGNOSIS — M47.816 LUMBAR FACET ARTHROPATHY: ICD-10-CM

## 2023-07-14 DIAGNOSIS — S32.010A CLOSED COMPRESSION FRACTURE OF BODY OF L1 VERTEBRA (HCC): ICD-10-CM

## 2023-07-14 DIAGNOSIS — M47.816 LUMBAR SPONDYLOSIS: ICD-10-CM

## 2023-07-14 DIAGNOSIS — S22.080A T12 COMPRESSION FRACTURE, INITIAL ENCOUNTER (HCC): ICD-10-CM

## 2023-07-14 DIAGNOSIS — M51.9 LUMBAR DISC DISORDER: ICD-10-CM

## 2023-07-14 PROCEDURE — 99213 OFFICE O/P EST LOW 20 MIN: CPT | Performed by: PAIN MEDICINE

## 2023-07-14 RX ORDER — OXYCODONE HYDROCHLORIDE AND ACETAMINOPHEN 5; 325 MG/1; MG/1
1 TABLET ORAL 2 TIMES DAILY PRN
Qty: 60 TABLET | Refills: 0 | Status: SHIPPED | OUTPATIENT
Start: 2023-07-14 | End: 2023-08-13

## 2023-07-26 NOTE — NUR
Family Contact: OLGA called the pts wife, Mendy ([phone]), and informed her that the 
pt is going to be discharged the following day. [Normal] : normal gait, coordination grossly intact, no focal deficits and deep tendon reflexes were 2+ and symmetric

## 2023-09-19 ENCOUNTER — OFFICE VISIT (OUTPATIENT)
Dept: PAIN MANAGEMENT | Age: 73
End: 2023-09-19
Payer: MEDICARE

## 2023-09-19 VITALS
RESPIRATION RATE: 18 BRPM | HEART RATE: 76 BPM | SYSTOLIC BLOOD PRESSURE: 130 MMHG | HEIGHT: 69 IN | TEMPERATURE: 97.7 F | WEIGHT: 165 LBS | BODY MASS INDEX: 24.44 KG/M2 | DIASTOLIC BLOOD PRESSURE: 66 MMHG | OXYGEN SATURATION: 93 %

## 2023-09-19 DIAGNOSIS — S32.010A CLOSED COMPRESSION FRACTURE OF BODY OF L1 VERTEBRA (HCC): ICD-10-CM

## 2023-09-19 DIAGNOSIS — M47.816 LUMBAR FACET ARTHROPATHY: ICD-10-CM

## 2023-09-19 DIAGNOSIS — M51.9 LUMBAR DISC DISORDER: ICD-10-CM

## 2023-09-19 DIAGNOSIS — S22.080A T12 COMPRESSION FRACTURE, INITIAL ENCOUNTER (HCC): ICD-10-CM

## 2023-09-19 DIAGNOSIS — G89.4 CHRONIC PAIN SYNDROME: Primary | ICD-10-CM

## 2023-09-19 DIAGNOSIS — M47.816 LUMBAR SPONDYLOSIS: ICD-10-CM

## 2023-09-19 DIAGNOSIS — M48.061 SPINAL STENOSIS OF LUMBAR REGION WITHOUT NEUROGENIC CLAUDICATION: ICD-10-CM

## 2023-09-19 PROCEDURE — 3017F COLORECTAL CA SCREEN DOC REV: CPT | Performed by: PAIN MEDICINE

## 2023-09-19 PROCEDURE — 99213 OFFICE O/P EST LOW 20 MIN: CPT | Performed by: PAIN MEDICINE

## 2023-09-19 PROCEDURE — 99214 OFFICE O/P EST MOD 30 MIN: CPT | Performed by: PAIN MEDICINE

## 2023-09-19 PROCEDURE — G8427 DOCREV CUR MEDS BY ELIG CLIN: HCPCS | Performed by: PAIN MEDICINE

## 2023-09-19 PROCEDURE — G8420 CALC BMI NORM PARAMETERS: HCPCS | Performed by: PAIN MEDICINE

## 2023-09-19 PROCEDURE — 1036F TOBACCO NON-USER: CPT | Performed by: PAIN MEDICINE

## 2023-09-19 PROCEDURE — 1123F ACP DISCUSS/DSCN MKR DOCD: CPT | Performed by: PAIN MEDICINE

## 2023-09-19 RX ORDER — OXYCODONE HYDROCHLORIDE AND ACETAMINOPHEN 5; 325 MG/1; MG/1
1 TABLET ORAL 2 TIMES DAILY PRN
Qty: 60 TABLET | Refills: 0 | Status: SHIPPED | OUTPATIENT
Start: 2023-09-19 | End: 2023-10-19

## 2023-11-28 ENCOUNTER — OFFICE VISIT (OUTPATIENT)
Dept: PAIN MANAGEMENT | Age: 73
End: 2023-11-28
Payer: MEDICARE

## 2023-11-28 VITALS
OXYGEN SATURATION: 98 % | HEIGHT: 69 IN | WEIGHT: 165 LBS | HEART RATE: 84 BPM | RESPIRATION RATE: 18 BRPM | DIASTOLIC BLOOD PRESSURE: 70 MMHG | BODY MASS INDEX: 24.44 KG/M2 | SYSTOLIC BLOOD PRESSURE: 130 MMHG | TEMPERATURE: 95.9 F

## 2023-11-28 DIAGNOSIS — G89.4 CHRONIC PAIN SYNDROME: Primary | ICD-10-CM

## 2023-11-28 DIAGNOSIS — M47.816 LUMBAR SPONDYLOSIS: ICD-10-CM

## 2023-11-28 DIAGNOSIS — M51.9 LUMBAR DISC DISORDER: ICD-10-CM

## 2023-11-28 DIAGNOSIS — M47.816 LUMBAR FACET ARTHROPATHY: ICD-10-CM

## 2023-11-28 DIAGNOSIS — M48.061 SPINAL STENOSIS OF LUMBAR REGION WITHOUT NEUROGENIC CLAUDICATION: ICD-10-CM

## 2023-11-28 DIAGNOSIS — S22.080A T12 COMPRESSION FRACTURE, INITIAL ENCOUNTER (HCC): ICD-10-CM

## 2023-11-28 DIAGNOSIS — S32.010A CLOSED COMPRESSION FRACTURE OF BODY OF L1 VERTEBRA (HCC): ICD-10-CM

## 2023-11-28 PROCEDURE — 99214 OFFICE O/P EST MOD 30 MIN: CPT | Performed by: PAIN MEDICINE

## 2023-11-28 PROCEDURE — G8484 FLU IMMUNIZE NO ADMIN: HCPCS | Performed by: PAIN MEDICINE

## 2023-11-28 PROCEDURE — G8420 CALC BMI NORM PARAMETERS: HCPCS | Performed by: PAIN MEDICINE

## 2023-11-28 PROCEDURE — 1123F ACP DISCUSS/DSCN MKR DOCD: CPT | Performed by: PAIN MEDICINE

## 2023-11-28 PROCEDURE — 99213 OFFICE O/P EST LOW 20 MIN: CPT | Performed by: PAIN MEDICINE

## 2023-11-28 PROCEDURE — 3017F COLORECTAL CA SCREEN DOC REV: CPT | Performed by: PAIN MEDICINE

## 2023-11-28 PROCEDURE — 1036F TOBACCO NON-USER: CPT | Performed by: PAIN MEDICINE

## 2023-11-28 PROCEDURE — G8427 DOCREV CUR MEDS BY ELIG CLIN: HCPCS | Performed by: PAIN MEDICINE

## 2023-11-28 RX ORDER — HYDRALAZINE HYDROCHLORIDE 25 MG/1
TABLET, FILM COATED ORAL
Status: ON HOLD | COMMUNITY
Start: 2023-10-27 | End: 2023-12-02 | Stop reason: HOSPADM

## 2023-11-28 RX ORDER — IBUPROFEN 800 MG/1
TABLET ORAL
COMMUNITY
Start: 2023-11-11

## 2023-11-28 RX ORDER — OXYCODONE HYDROCHLORIDE AND ACETAMINOPHEN 5; 325 MG/1; MG/1
1 TABLET ORAL 2 TIMES DAILY PRN
Qty: 60 TABLET | Refills: 0 | Status: SHIPPED | OUTPATIENT
Start: 2023-11-28 | End: 2023-12-28

## 2023-11-29 ENCOUNTER — ANESTHESIA (OUTPATIENT)
Dept: OPERATING ROOM | Age: 73
End: 2023-11-29
Payer: MEDICARE

## 2023-11-29 ENCOUNTER — APPOINTMENT (OUTPATIENT)
Dept: GENERAL RADIOLOGY | Age: 73
End: 2023-11-29
Payer: MEDICARE

## 2023-11-29 ENCOUNTER — APPOINTMENT (OUTPATIENT)
Dept: CT IMAGING | Age: 73
End: 2023-11-29
Payer: MEDICARE

## 2023-11-29 ENCOUNTER — HOSPITAL ENCOUNTER (INPATIENT)
Age: 73
LOS: 3 days | Discharge: OTHER INSTITUTION | End: 2023-12-02
Attending: STUDENT IN AN ORGANIZED HEALTH CARE EDUCATION/TRAINING PROGRAM | Admitting: FAMILY MEDICINE
Payer: MEDICARE

## 2023-11-29 DIAGNOSIS — G89.18 POST-OP PAIN: ICD-10-CM

## 2023-11-29 DIAGNOSIS — D64.9 CHRONIC ANEMIA: ICD-10-CM

## 2023-11-29 DIAGNOSIS — E87.6 HYPOKALEMIA: ICD-10-CM

## 2023-11-29 DIAGNOSIS — R79.89 ELEVATED TROPONIN: ICD-10-CM

## 2023-11-29 DIAGNOSIS — S72.001A CLOSED RIGHT HIP FRACTURE, INITIAL ENCOUNTER (HCC): ICD-10-CM

## 2023-11-29 DIAGNOSIS — S72.141A CLOSED INTERTROCHANTERIC FRACTURE, RIGHT, INITIAL ENCOUNTER (HCC): ICD-10-CM

## 2023-11-29 DIAGNOSIS — I21.4 NSTEMI (NON-ST ELEVATED MYOCARDIAL INFARCTION) (HCC): Primary | ICD-10-CM

## 2023-11-29 PROBLEM — I10 PRIMARY HYPERTENSION: Status: ACTIVE | Noted: 2023-11-29

## 2023-11-29 PROBLEM — S72.002A HIP FRACTURE REQUIRING OPERATIVE REPAIR, LEFT, CLOSED, INITIAL ENCOUNTER (HCC): Status: ACTIVE | Noted: 2023-11-29

## 2023-11-29 PROBLEM — Z01.810 PREOP CARDIOVASCULAR EXAM: Status: ACTIVE | Noted: 2023-11-29

## 2023-11-29 LAB
ALBUMIN SERPL-MCNC: 3.5 G/DL (ref 3.5–5.2)
ALP SERPL-CCNC: 75 U/L (ref 40–129)
ALT SERPL-CCNC: 15 U/L (ref 0–40)
ANION GAP SERPL CALCULATED.3IONS-SCNC: 12 MMOL/L (ref 7–16)
AST SERPL-CCNC: 22 U/L (ref 0–39)
BASOPHILS # BLD: 0.02 K/UL (ref 0–0.2)
BASOPHILS NFR BLD: 0 % (ref 0–2)
BILIRUB SERPL-MCNC: 0.5 MG/DL (ref 0–1.2)
BUN SERPL-MCNC: 25 MG/DL (ref 6–23)
CALCIUM SERPL-MCNC: 9.4 MG/DL (ref 8.6–10.2)
CHLORIDE SERPL-SCNC: 109 MMOL/L (ref 98–107)
CO2 SERPL-SCNC: 19 MMOL/L (ref 22–29)
CREAT SERPL-MCNC: 1.1 MG/DL (ref 0.7–1.2)
EOSINOPHIL # BLD: 0.04 K/UL (ref 0.05–0.5)
EOSINOPHILS RELATIVE PERCENT: 0 % (ref 0–6)
ERYTHROCYTE [DISTWIDTH] IN BLOOD BY AUTOMATED COUNT: 12.4 % (ref 11.5–15)
GFR SERPL CREATININE-BSD FRML MDRD: >60 ML/MIN/1.73M2
GLUCOSE SERPL-MCNC: 93 MG/DL (ref 74–99)
HCT VFR BLD AUTO: 31.5 % (ref 37–54)
HGB BLD-MCNC: 10.6 G/DL (ref 12.5–16.5)
IMM GRANULOCYTES # BLD AUTO: 0.04 K/UL (ref 0–0.58)
IMM GRANULOCYTES NFR BLD: 0 % (ref 0–5)
LYMPHOCYTES NFR BLD: 0.8 K/UL (ref 1.5–4)
LYMPHOCYTES RELATIVE PERCENT: 7 % (ref 20–42)
MAGNESIUM SERPL-MCNC: 1.4 MG/DL (ref 1.6–2.6)
MCH RBC QN AUTO: 32.5 PG (ref 26–35)
MCHC RBC AUTO-ENTMCNC: 33.7 G/DL (ref 32–34.5)
MCV RBC AUTO: 96.6 FL (ref 80–99.9)
MONOCYTES NFR BLD: 0.47 K/UL (ref 0.1–0.95)
MONOCYTES NFR BLD: 4 % (ref 2–12)
NEUTROPHILS NFR BLD: 87 % (ref 43–80)
NEUTS SEG NFR BLD: 9.49 K/UL (ref 1.8–7.3)
PLATELET # BLD AUTO: 258 K/UL (ref 130–450)
PMV BLD AUTO: 10.5 FL (ref 7–12)
POTASSIUM SERPL-SCNC: 3.4 MMOL/L (ref 3.5–5)
PROT SERPL-MCNC: 6.3 G/DL (ref 6.4–8.3)
RBC # BLD AUTO: 3.26 M/UL (ref 3.8–5.8)
SODIUM SERPL-SCNC: 140 MMOL/L (ref 132–146)
TROPONIN I SERPL HS-MCNC: 106 NG/L (ref 0–11)
TROPONIN I SERPL HS-MCNC: 86 NG/L (ref 0–11)
TROPONIN I SERPL HS-MCNC: 91 NG/L (ref 0–11)
TSH SERPL DL<=0.05 MIU/L-ACNC: 2.83 UIU/ML (ref 0.27–4.2)
WBC OTHER # BLD: 10.9 K/UL (ref 4.5–11.5)

## 2023-11-29 PROCEDURE — 6370000000 HC RX 637 (ALT 250 FOR IP): Performed by: INTERNAL MEDICINE

## 2023-11-29 PROCEDURE — 73552 X-RAY EXAM OF FEMUR 2/>: CPT

## 2023-11-29 PROCEDURE — 1200000000 HC SEMI PRIVATE

## 2023-11-29 PROCEDURE — APPSS60 APP SPLIT SHARED TIME 46-60 MINUTES

## 2023-11-29 PROCEDURE — 99222 1ST HOSP IP/OBS MODERATE 55: CPT | Performed by: INTERNAL MEDICINE

## 2023-11-29 PROCEDURE — 84443 ASSAY THYROID STIM HORMONE: CPT

## 2023-11-29 PROCEDURE — 6360000002 HC RX W HCPCS

## 2023-11-29 PROCEDURE — 83735 ASSAY OF MAGNESIUM: CPT

## 2023-11-29 PROCEDURE — 2060000000 HC ICU INTERMEDIATE R&B

## 2023-11-29 PROCEDURE — 6370000000 HC RX 637 (ALT 250 FOR IP): Performed by: FAMILY MEDICINE

## 2023-11-29 PROCEDURE — 6360000002 HC RX W HCPCS: Performed by: FAMILY MEDICINE

## 2023-11-29 PROCEDURE — 85025 COMPLETE CBC W/AUTO DIFF WBC: CPT

## 2023-11-29 PROCEDURE — 2580000003 HC RX 258: Performed by: INTERNAL MEDICINE

## 2023-11-29 PROCEDURE — 84484 ASSAY OF TROPONIN QUANT: CPT

## 2023-11-29 PROCEDURE — 93005 ELECTROCARDIOGRAM TRACING: CPT | Performed by: STUDENT IN AN ORGANIZED HEALTH CARE EDUCATION/TRAINING PROGRAM

## 2023-11-29 PROCEDURE — 2580000003 HC RX 258: Performed by: STUDENT IN AN ORGANIZED HEALTH CARE EDUCATION/TRAINING PROGRAM

## 2023-11-29 PROCEDURE — 72125 CT NECK SPINE W/O DYE: CPT

## 2023-11-29 PROCEDURE — 99285 EMERGENCY DEPT VISIT HI MDM: CPT

## 2023-11-29 PROCEDURE — 73700 CT LOWER EXTREMITY W/O DYE: CPT

## 2023-11-29 PROCEDURE — 73502 X-RAY EXAM HIP UNI 2-3 VIEWS: CPT

## 2023-11-29 PROCEDURE — 96376 TX/PRO/DX INJ SAME DRUG ADON: CPT

## 2023-11-29 PROCEDURE — 80053 COMPREHEN METABOLIC PANEL: CPT

## 2023-11-29 PROCEDURE — 70450 CT HEAD/BRAIN W/O DYE: CPT

## 2023-11-29 PROCEDURE — 99222 1ST HOSP IP/OBS MODERATE 55: CPT | Performed by: FAMILY MEDICINE

## 2023-11-29 PROCEDURE — 96374 THER/PROPH/DIAG INJ IV PUSH: CPT

## 2023-11-29 PROCEDURE — 6370000000 HC RX 637 (ALT 250 FOR IP)

## 2023-11-29 RX ORDER — FENTANYL CITRATE 50 UG/ML
50 INJECTION, SOLUTION INTRAMUSCULAR; INTRAVENOUS ONCE
Status: COMPLETED | OUTPATIENT
Start: 2023-11-29 | End: 2023-11-29

## 2023-11-29 RX ORDER — POLYETHYLENE GLYCOL 3350 17 G/17G
17 POWDER, FOR SOLUTION ORAL DAILY PRN
Status: DISCONTINUED | OUTPATIENT
Start: 2023-11-29 | End: 2023-12-02 | Stop reason: HOSPADM

## 2023-11-29 RX ORDER — 0.9 % SODIUM CHLORIDE 0.9 %
1000 INTRAVENOUS SOLUTION INTRAVENOUS ONCE
Status: COMPLETED | OUTPATIENT
Start: 2023-11-29 | End: 2023-11-29

## 2023-11-29 RX ORDER — ONDANSETRON 4 MG/1
4 TABLET, ORALLY DISINTEGRATING ORAL EVERY 8 HOURS PRN
Status: DISCONTINUED | OUTPATIENT
Start: 2023-11-29 | End: 2023-12-02 | Stop reason: HOSPADM

## 2023-11-29 RX ORDER — SODIUM CHLORIDE 9 MG/ML
INJECTION, SOLUTION INTRAVENOUS PRN
Status: DISCONTINUED | OUTPATIENT
Start: 2023-11-29 | End: 2023-12-02 | Stop reason: HOSPADM

## 2023-11-29 RX ORDER — CALCIUM CARBONATE 500(1250)
500 TABLET ORAL DAILY
Status: DISCONTINUED | OUTPATIENT
Start: 2023-11-29 | End: 2023-12-02 | Stop reason: HOSPADM

## 2023-11-29 RX ORDER — HYDROCHLOROTHIAZIDE 12.5 MG/1
12.5 TABLET ORAL DAILY
Status: DISCONTINUED | OUTPATIENT
Start: 2023-11-29 | End: 2023-12-02 | Stop reason: HOSPADM

## 2023-11-29 RX ORDER — MAGNESIUM SULFATE IN WATER 40 MG/ML
2000 INJECTION, SOLUTION INTRAVENOUS PRN
Status: DISCONTINUED | OUTPATIENT
Start: 2023-11-29 | End: 2023-12-02 | Stop reason: HOSPADM

## 2023-11-29 RX ORDER — POTASSIUM CHLORIDE 20 MEQ/1
40 TABLET, EXTENDED RELEASE ORAL PRN
Status: DISCONTINUED | OUTPATIENT
Start: 2023-11-29 | End: 2023-12-02 | Stop reason: HOSPADM

## 2023-11-29 RX ORDER — ONDANSETRON 2 MG/ML
4 INJECTION INTRAMUSCULAR; INTRAVENOUS EVERY 6 HOURS PRN
Status: DISCONTINUED | OUTPATIENT
Start: 2023-11-29 | End: 2023-12-02 | Stop reason: HOSPADM

## 2023-11-29 RX ORDER — SODIUM CHLORIDE 9 MG/ML
INJECTION, SOLUTION INTRAVENOUS ONCE
Status: DISCONTINUED | OUTPATIENT
Start: 2023-11-29 | End: 2023-12-02 | Stop reason: HOSPADM

## 2023-11-29 RX ORDER — ENOXAPARIN SODIUM 100 MG/ML
40 INJECTION SUBCUTANEOUS DAILY
Status: DISCONTINUED | OUTPATIENT
Start: 2023-11-29 | End: 2023-12-02 | Stop reason: HOSPADM

## 2023-11-29 RX ORDER — POTASSIUM CHLORIDE 7.45 MG/ML
10 INJECTION INTRAVENOUS PRN
Status: DISCONTINUED | OUTPATIENT
Start: 2023-11-29 | End: 2023-12-02 | Stop reason: HOSPADM

## 2023-11-29 RX ORDER — ACETAMINOPHEN 325 MG/1
650 TABLET ORAL EVERY 6 HOURS PRN
Status: DISCONTINUED | OUTPATIENT
Start: 2023-11-29 | End: 2023-12-02 | Stop reason: HOSPADM

## 2023-11-29 RX ORDER — VALSARTAN AND HYDROCHLOROTHIAZIDE 320; 12.5 MG/1; MG/1
1 TABLET, FILM COATED ORAL DAILY
Status: DISCONTINUED | OUTPATIENT
Start: 2023-11-29 | End: 2023-11-29 | Stop reason: CLARIF

## 2023-11-29 RX ORDER — VALSARTAN 320 MG/1
320 TABLET ORAL DAILY
Status: DISCONTINUED | OUTPATIENT
Start: 2023-11-29 | End: 2023-12-02 | Stop reason: HOSPADM

## 2023-11-29 RX ORDER — SODIUM CHLORIDE 0.9 % (FLUSH) 0.9 %
5-40 SYRINGE (ML) INJECTION PRN
Status: DISCONTINUED | OUTPATIENT
Start: 2023-11-29 | End: 2023-12-02 | Stop reason: HOSPADM

## 2023-11-29 RX ORDER — OXYCODONE HYDROCHLORIDE AND ACETAMINOPHEN 5; 325 MG/1; MG/1
1 TABLET ORAL EVERY 4 HOURS PRN
Status: DISCONTINUED | OUTPATIENT
Start: 2023-11-29 | End: 2023-12-01

## 2023-11-29 RX ORDER — KETOROLAC TROMETHAMINE 30 MG/ML
30 INJECTION, SOLUTION INTRAMUSCULAR; INTRAVENOUS EVERY 6 HOURS PRN
Status: DISCONTINUED | OUTPATIENT
Start: 2023-11-29 | End: 2023-11-29

## 2023-11-29 RX ORDER — SODIUM CHLORIDE 0.9 % (FLUSH) 0.9 %
5-40 SYRINGE (ML) INJECTION EVERY 12 HOURS SCHEDULED
Status: DISCONTINUED | OUTPATIENT
Start: 2023-11-29 | End: 2023-12-02 | Stop reason: HOSPADM

## 2023-11-29 RX ORDER — ACETAMINOPHEN 650 MG/1
650 SUPPOSITORY RECTAL EVERY 6 HOURS PRN
Status: DISCONTINUED | OUTPATIENT
Start: 2023-11-29 | End: 2023-12-02 | Stop reason: HOSPADM

## 2023-11-29 RX ADMIN — FENTANYL CITRATE 50 MCG: 50 INJECTION, SOLUTION INTRAMUSCULAR; INTRAVENOUS at 14:10

## 2023-11-29 RX ADMIN — FENTANYL CITRATE 50 MCG: 50 INJECTION, SOLUTION INTRAMUSCULAR; INTRAVENOUS at 09:11

## 2023-11-29 RX ADMIN — FENTANYL CITRATE 50 MCG: 50 INJECTION, SOLUTION INTRAMUSCULAR; INTRAVENOUS at 11:38

## 2023-11-29 RX ADMIN — POTASSIUM BICARBONATE 40 MEQ: 782 TABLET, EFFERVESCENT ORAL at 17:07

## 2023-11-29 RX ADMIN — OXYCODONE AND ACETAMINOPHEN 1 TABLET: 5; 325 TABLET ORAL at 17:05

## 2023-11-29 RX ADMIN — HYDROMORPHONE HYDROCHLORIDE 0.5 MG: 1 INJECTION, SOLUTION INTRAMUSCULAR; INTRAVENOUS; SUBCUTANEOUS at 20:44

## 2023-11-29 RX ADMIN — HYDROCHLOROTHIAZIDE 12.5 MG: 12.5 TABLET ORAL at 21:27

## 2023-11-29 RX ADMIN — VALSARTAN 320 MG: 320 TABLET ORAL at 21:26

## 2023-11-29 RX ADMIN — SODIUM CHLORIDE 1000 ML: 9 INJECTION, SOLUTION INTRAVENOUS at 12:57

## 2023-11-29 RX ADMIN — OXYCODONE AND ACETAMINOPHEN 1 TABLET: 5; 325 TABLET ORAL at 22:00

## 2023-11-29 RX ADMIN — HYDROMORPHONE HYDROCHLORIDE 0.5 MG: 1 INJECTION, SOLUTION INTRAMUSCULAR; INTRAVENOUS; SUBCUTANEOUS at 23:38

## 2023-11-29 RX ADMIN — SODIUM CHLORIDE, PRESERVATIVE FREE 10 ML: 5 INJECTION INTRAVENOUS at 20:45

## 2023-11-29 ASSESSMENT — PAIN DESCRIPTION - DESCRIPTORS
DESCRIPTORS: ACHING
DESCRIPTORS: ACHING;DISCOMFORT;SORE;SPASM
DESCRIPTORS: ACHING

## 2023-11-29 ASSESSMENT — PAIN SCALES - GENERAL
PAINLEVEL_OUTOF10: 7
PAINLEVEL_OUTOF10: 10
PAINLEVEL_OUTOF10: 10
PAINLEVEL_OUTOF10: 7
PAINLEVEL_OUTOF10: 10

## 2023-11-29 ASSESSMENT — PAIN DESCRIPTION - ORIENTATION
ORIENTATION: RIGHT

## 2023-11-29 ASSESSMENT — PAIN DESCRIPTION - ONSET: ONSET: ON-GOING

## 2023-11-29 ASSESSMENT — PAIN DESCRIPTION - LOCATION
LOCATION: HIP

## 2023-11-29 ASSESSMENT — PAIN DESCRIPTION - PAIN TYPE: TYPE: ACUTE PAIN

## 2023-11-29 ASSESSMENT — PAIN DESCRIPTION - FREQUENCY: FREQUENCY: CONTINUOUS

## 2023-11-29 NOTE — ED NOTES
Patient presents to ED via EMS for a mechanical fall that occurred while taking his dog outside. Patient stated he fell on the pavement, having pain to his right elbow and right hip. Patient denies any blood thinners, LOC, or head injury. Patient was given 100 mcg of fentanyl PTA, EMS established a 20 g IV in left AC. 500 mL of NS given by EMS.         Hannah Marshall RN  11/29/23 [id number]

## 2023-11-29 NOTE — ED NOTES
Patient resting in bed comfortably. Updated patient and family on plan of care, still waiting to hear back from orthopedic surgery. Informed patient and family patient is to be NPO until we hear back from them. Patient and family agreeable. Patient reports pain has decreased since medication administration.       Brenna Velazquez RN  11/29/23 4492

## 2023-11-29 NOTE — ED NOTES
Patient taken to the bathroom with urinal. Updated patient on plan of care. No stated problems or concerns at this time.       Migel Bradley RN  11/29/23 [id number]

## 2023-11-29 NOTE — ED NOTES
Patient placed in bathroom and provided a urinal     Amado Dayton, [address] [place] [address]  11/29/23 5240

## 2023-11-29 NOTE — CONSULTS
Inpatient Cardiology Consultation      Reason for Consult:  Elevated troponin     Consulting Physician: Dr. Titi Bueno    Requesting Physician:  Rafael Dyer DO    Date of Consultation: 11/29/2023    HISTORY OF PRESENT ILLNESS:   Patient is a 30-year-old male who is previously unknown to [facility] cardiology. HPI:  Patient presented to ER 11/29/2023 for mechanical fall while walking his dog falling onto his right side. Patient denies LOC or blood thinners. Patient was given 100 mcg fentanyl in EMS. Patient was found to have right femoral neck fracture. VS upon arrival 97.8, 16 respirations, 90 pulse, 159/73, 95% room air. \\  Labs: Potassium 3.4, chloride 109, CO2 19, BUN 25, creatinine 1.1, glucose 93, total protein 6.3, troponin 86 > 91 > 106 (10/2021: 62), albumin 3.5, TSH 2.83, WBC 10.9, H&H 10.6/31.5, platelet 862  CT right hip: Comminuted impacted fracture of the right basicervical region femoral neck with intertrochanteric extension. Fragmentation of the lateral aspect without significant angulation. CT head: No acute process    Upon assessment today 11/29/2023 patient is lying Semi-Calabrese in hospital bed in hallway. Patient reports he was walking his dog when he had a mechanical fall falling onto his right side injuring his hip. Patient denies any new or recent chest pain, SOB, PETERSON, palpitations, dizziness, syncope, diaphoresis, orthopnea, or PND. Patient only complaint at this time is back and right hip pain. Patient's physical exam unremarkable. Most recent VS 16 respirations, 85 pulse, 167/61, 95% on room air. Please note: past medical records were reviewed per electronic medical record (EMR) - see detailed reports under Past Medical/ Surgical History.    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 Allergies:  Doxycycline    Social History:    Social History     Socioeconomic History    Marital status:      Spouse name: Not on file    Number of children: Not on file    Years of education: Not on file    Highest education level: Not on file   Occupational History    Not on file   Tobacco Use    Smoking status: Former     Packs/day: .5     Types: Cigarettes     Quit date:      Years since quittin.9    Smokeless tobacco: Never   Vaping Use    Vaping Use: Never used   Substance and Sexual Activity    Alcohol use: Yes     Comment: occasionally    Drug use: No    Sexual activity: Not on file   Other Topics Concern    Not on file   Social History Narrative    Not on file     Social Determinants of Health     Financial Resource Strain: Not on file   Food Insecurity: Not on file   Transportation Needs: Not on file   Physical Activity: Not on file   Stress: Not on file   Social Connections: Not on file   Intimate Partner Violence: Not on file   Housing Stability: Not on file       Family History:   No family history on file. REVIEW OF SYSTEMS:     Constitutional: Denies fevers, chills, night sweats, and fatigue  HEENT: Denies headaches, nose bleeds, and blurred vision,oral pain, abscess or lesion. Musculoskeletal: Denies pain to BLE with ambulation and edema to BLE. Mechanical fall prior to arrival with right hip injury  Neurological: Denies dizziness and lightheadedness, numbness and tingling  Cardiovascular: Denies chest pain, palpitations, and feelings of heart racing. Respiratory: Denies orthopnea and PND, SOB/PETERSON  Gastrointestinal: Denies heartburn, nausea/vomiting, diarrhea and constipation, black/bloody, and tarry stools.    Genitourinary: Denies dysuria and hematuria  Hematologic: Denies excessive bruising or bleeding  Lymphatic: Denies lumps and bumps to neck, axilla, breast, and groin  Endocrine: Denies excessive thirst. Denies PROFILE:  Recent Labs     11/29/23  0845   AST 22   ALT 15   LABALBU 3.5      Latest Reference Range & Units 11/29/23 08:45 11/29/23 10:23 11/29/23 12:46   Troponin, High Sensitivity 0 - 11 ng/L 86 (H) 91 (H) 106 (H)   (H): Data is abnormally high    CT right hip:  Comminuted impacted fracture of the right basicervical region femoral neck  with intertrochanteric extension. Fragmentation of the lateral aspect  without significant angulation. Right femoral head remains well located. Assessment:  Type II NSTEMI 2/2 demand ischemia from traumatic right femoral neck fracture. Patient with no chest pain or anginal equivalent symptoms. Right comminuted impacted fracture of right basicervical region of femoral neck 2/2 mechanical fall earlier today with no head injury or LOC. 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  Does not have to be done prior to surgery. Patient may proceed with orthopedic surgery. 40 mEq p.o. potassium. Troponin tomorrow a.m. Check magnesium. Continue telemetry monitoring. Monitor electrolytes: Keep potassium >4.0 and magnesium >2.0. Rest per primary service and other consultants. Case discussed with Dr. Horace Brooks. Further recommendations to follow as per above and per Dr. Horace Brooks.       Electronically signed by IVAN Garrett CNP on 11/29/2023 at 2:18 PM

## 2023-11-29 NOTE — ED NOTES
Patient resting in bed comfortably, this Rn took patient to the bathroom, given urinal. Patient reports increased pain, provider notified and 50 mcg of fentanyl ordered and given. Updated patient and family on plan of care, waiting to hear back from the orthopedic surgery consult. Patient has no stated problems or concerns at this time.       Boris Jordan RN  11/29/23 4172

## 2023-11-29 NOTE — ED NOTES
Attempted to change patient into gown, patient reports too much pain and is unable to change at this time. Will try again with some pain medication.       Freya Tristan RN  11/29/23 2496

## 2023-11-29 NOTE — CONSULTS
palpable  Lungs: Breathing not labored, no acute distress  Abdomen: Soft, nontender  Skin: Warm and dry, no open lesions or rash  Neuro: Sensation intact to light touch in bilateral upper and lower extremities     Right lower extremity:  Right leg   Skin intact  Pain with any movement  No tenderness to palpation right knee or ankle  SILT L1-S1  TA/EHL/GS intact  Palpable DP, W/WP     Left lower extremity  No tenderness to palpation, full passive ROM, SILT     Bilateral upper extremities:  No tenderness to palpation, full passive ROM, SILT    LABS:  CBC with Differential:    Lab Results   Component Value Date/Time    WBC 10.9 11/29/2023 08:45 AM    RBC 3.26 11/29/2023 08:45 AM    HGB 10.6 11/29/2023 08:45 AM    HCT 31.5 11/29/2023 08:45 AM     11/29/2023 08:45 AM    MCV 96.6 11/29/2023 08:45 AM    MCH 32.5 11/29/2023 08:45 AM    MCHC 33.7 11/29/2023 08:45 AM    RDW 12.4 11/29/2023 08:45 AM    METASPCT 1.0 02/07/2019 01:54 PM    LYMPHOPCT 7 11/29/2023 08:45 AM    MONOPCT 4 11/29/2023 08:45 AM    MYELOPCT 3.0 02/07/2019 01:54 PM    BASOPCT 0 11/29/2023 08:45 AM    MONOSABS 0.47 11/29/2023 08:45 AM    LYMPHSABS 0.80 11/29/2023 08:45 AM    EOSABS 0.04 11/29/2023 08:45 AM    BASOSABS 0.02 11/29/2023 08:45 AM       IMAGES:   Xray AP Pelvis and 2 view right hip:  right basicervical femoral neck fracture with intertrochanteric extension    ASSESSMENT:    68y.o. year-old male with right basicervical femoral neck fracture with intertrochanteric extension      PLAN:  I had a long discussion with the patient regarding right basicervical femoral neck fracture with intertrochanteric extension. Operative management was discussed. The risks and benefits of surgery were discussed and all questions were answered. He would like to proceed with surgery. - elevated troponins  - Patient will require cardiology and general medical clearance regarding his clotting ability and bleeding risk.   - Plan for sx tomorrow pending clearance. - ANISH @ [facility]  - IVF  - NWB RLE  - Pain control per primary  - Plan for IMN fixation right hip fracture     I spent over 50 minutes reviewing the EMR, radiographs, examining the patient, and discussing the injury and treatment plan with the patient and his wife.

## 2023-11-29 NOTE — DISCHARGE INSTRUCTIONS
Orthopedic Instructions:  Weight bearing status: Weight bearing as tolerated for the right leg  Keep dressing clean and dry. Starting 5 days after surgery, Ok for daily dressing changes until wound is dry. Then leave open to air. Starting 5 days after surgery, if wound is no longer leaking, Ok to shower but no soaks in a bath, hot tub, pool, etc.  Physical Therapy for strengthening, range of motion, and gait training. Ice (20 minutes on and off 1 hour) and elevate above the level of the heart to reduce swelling and throbbing pain. Drink plenty of fluids. Should urinate within 8 hours of surgery. Call the office or come to Emergency Room if signs of infection appear (hot, swollen, red, draining pus, fever)  Take medications as prescribed. Wean off narcotics (percocet/norco) as soon as possible. Do not take tylenol if still taking narcotics. Follow up with Dr. Vitaliy Mackenzie in his office 10-14 days after surgery. Call [phone] to schedule/confirm.

## 2023-11-30 ENCOUNTER — ANESTHESIA EVENT (OUTPATIENT)
Dept: OPERATING ROOM | Age: 73
End: 2023-11-30
Payer: MEDICARE

## 2023-11-30 ENCOUNTER — APPOINTMENT (OUTPATIENT)
Age: 73
End: 2023-11-30
Payer: MEDICARE

## 2023-11-30 ENCOUNTER — APPOINTMENT (OUTPATIENT)
Dept: GENERAL RADIOLOGY | Age: 73
End: 2023-11-30
Payer: MEDICARE

## 2023-11-30 LAB
ALBUMIN SERPL-MCNC: 3.2 G/DL (ref 3.5–5.2)
ALP SERPL-CCNC: 69 U/L (ref 40–129)
ALT SERPL-CCNC: 18 U/L (ref 0–40)
ANION GAP SERPL CALCULATED.3IONS-SCNC: 11 MMOL/L (ref 7–16)
AST SERPL-CCNC: 35 U/L (ref 0–39)
BASOPHILS # BLD: 0.05 K/UL (ref 0–0.2)
BASOPHILS NFR BLD: 1 % (ref 0–2)
BILIRUB SERPL-MCNC: 0.8 MG/DL (ref 0–1.2)
BUN SERPL-MCNC: 22 MG/DL (ref 6–23)
CALCIUM SERPL-MCNC: 9.2 MG/DL (ref 8.6–10.2)
CHLORIDE SERPL-SCNC: 106 MMOL/L (ref 98–107)
CO2 SERPL-SCNC: 20 MMOL/L (ref 22–29)
CREAT SERPL-MCNC: 1 MG/DL (ref 0.7–1.2)
ECHO AO ASC DIAM: 2.9 CM
ECHO AO ASCENDING AORTA INDEX: 1.53 CM/M2
ECHO AV AREA PEAK VELOCITY: 2.3 CM2
ECHO AV AREA VTI: 2.7 CM2
ECHO AV AREA/BSA PEAK VELOCITY: 1.2 CM2/M2
ECHO AV AREA/BSA VTI: 1.4 CM2/M2
ECHO AV CUSP MM: 1.8 CM
ECHO AV MEAN GRADIENT: 5 MMHG
ECHO AV MEAN VELOCITY: 1 M/S
ECHO AV PEAK GRADIENT: 13 MMHG
ECHO AV PEAK VELOCITY: 1.8 M/S
ECHO AV VELOCITY RATIO: 0.67
ECHO AV VTI: 26.4 CM
ECHO BSA: 1.91 M2
ECHO EST RA PRESSURE: 3 MMHG
ECHO LA DIAMETER INDEX: 2.21 CM/M2
ECHO LA DIAMETER: 4.2 CM
ECHO LA VOL A-L A2C: 56 ML (ref 18–58)
ECHO LA VOL A-L A4C: 66 ML (ref 18–58)
ECHO LA VOL MOD A2C: 53 ML (ref 18–58)
ECHO LA VOL MOD A4C: 63 ML (ref 18–58)
ECHO LA VOLUME AREA LENGTH: 61 ML
ECHO LA VOLUME INDEX A-L A2C: 29 ML/M2 (ref 16–34)
ECHO LA VOLUME INDEX A-L A4C: 35 ML/M2 (ref 16–34)
ECHO LA VOLUME INDEX AREA LENGTH: 32 ML/M2 (ref 16–34)
ECHO LA VOLUME INDEX MOD A2C: 28 ML/M2 (ref 16–34)
ECHO LA VOLUME INDEX MOD A4C: 33 ML/M2 (ref 16–34)
ECHO LV E' LATERAL VELOCITY: 12 CM/S
ECHO LV E' SEPTAL VELOCITY: 9 CM/S
ECHO LV FRACTIONAL SHORTENING: 39 % (ref 28–44)
ECHO LV INTERNAL DIMENSION DIASTOLE INDEX: 2.42 CM/M2
ECHO LV INTERNAL DIMENSION DIASTOLIC: 4.6 CM (ref 4.2–5.9)
ECHO LV INTERNAL DIMENSION SYSTOLIC INDEX: 1.47 CM/M2
ECHO LV INTERNAL DIMENSION SYSTOLIC: 2.8 CM
ECHO LV IVSD: 1 CM (ref 0.6–1)
ECHO LV IVSS: 1.4 CM
ECHO LV MASS 2D: 169.9 G (ref 88–224)
ECHO LV MASS INDEX 2D: 89.4 G/M2 (ref 49–115)
ECHO LV POSTERIOR WALL DIASTOLIC: 1.1 CM (ref 0.6–1)
ECHO LV POSTERIOR WALL SYSTOLIC: 1.5 CM
ECHO LV RELATIVE WALL THICKNESS RATIO: 0.48
ECHO LVOT AREA: 3.5 CM2
ECHO LVOT AV VTI INDEX: 0.76
ECHO LVOT DIAM: 2.1 CM
ECHO LVOT MEAN GRADIENT: 2 MMHG
ECHO LVOT PEAK GRADIENT: 5 MMHG
ECHO LVOT PEAK VELOCITY: 1.2 M/S
ECHO LVOT STROKE VOLUME INDEX: 36.6 ML/M2
ECHO LVOT SV: 69.6 ML
ECHO LVOT VTI: 20.1 CM
ECHO MV "A" WAVE DURATION: 133.8 MSEC
ECHO MV A VELOCITY: 0.96 M/S
ECHO MV AREA PHT: 3 CM2
ECHO MV AREA VTI: 3.3 CM2
ECHO MV E DECELERATION TIME (DT): 254.8 MS
ECHO MV E VELOCITY: 0.72 M/S
ECHO MV E/A RATIO: 0.75
ECHO MV E/E' LATERAL: 6
ECHO MV E/E' RATIO (AVERAGED): 7
ECHO MV LVOT VTI INDEX: 1.05
ECHO MV MAX VELOCITY: 1 M/S
ECHO MV MEAN GRADIENT: 2 MMHG
ECHO MV MEAN VELOCITY: 0.6 M/S
ECHO MV PEAK GRADIENT: 4 MMHG
ECHO MV PRESSURE HALF TIME (PHT): 73.8 MS
ECHO MV VTI: 21.2 CM
ECHO PULMONARY ARTERY SYSTOLIC PRESSURE (PASP): 38 MMHG
ECHO PV MAX VELOCITY: 1.3 M/S
ECHO PV MEAN GRADIENT: 4 MMHG
ECHO PV MEAN VELOCITY: 0.9 M/S
ECHO PV PEAK GRADIENT: 7 MMHG
ECHO PV VTI: 18.4 CM
ECHO PVEIN A DURATION: 110.7 MS
ECHO PVEIN A VELOCITY: 0.3 M/S
ECHO PVEIN PEAK D VELOCITY: 0.9 M/S
ECHO PVEIN PEAK S VELOCITY: 0.9 M/S
ECHO PVEIN S/D RATIO: 1
ECHO RIGHT VENTRICULAR SYSTOLIC PRESSURE (RVSP): 38 MMHG
ECHO RV INTERNAL DIMENSION: 3.2 CM
ECHO RV TAPSE: 1.9 CM (ref 1.7–?)
ECHO TV REGURGITANT MAX VELOCITY: 2.94 M/S
ECHO TV REGURGITANT PEAK GRADIENT: 35 MMHG
EKG ATRIAL RATE: 83 BPM
EKG P AXIS: 105 DEGREES
EKG P-R INTERVAL: 182 MS
EKG Q-T INTERVAL: 392 MS
EKG QRS DURATION: 84 MS
EKG QTC CALCULATION (BAZETT): 460 MS
EKG R AXIS: 7 DEGREES
EKG T AXIS: 35 DEGREES
EKG VENTRICULAR RATE: 83 BPM
EOSINOPHIL # BLD: 0.06 K/UL (ref 0.05–0.5)
EOSINOPHILS RELATIVE PERCENT: 1 % (ref 0–6)
ERYTHROCYTE [DISTWIDTH] IN BLOOD BY AUTOMATED COUNT: 12.5 % (ref 11.5–15)
GFR SERPL CREATININE-BSD FRML MDRD: >60 ML/MIN/1.73M2
GLUCOSE BLD-MCNC: 81 MG/DL (ref 74–99)
GLUCOSE SERPL-MCNC: 90 MG/DL (ref 74–99)
HCT VFR BLD AUTO: 29.2 % (ref 37–54)
HGB BLD-MCNC: 9.7 G/DL (ref 12.5–16.5)
IMM GRANULOCYTES # BLD AUTO: 0.03 K/UL (ref 0–0.58)
IMM GRANULOCYTES NFR BLD: 0 % (ref 0–5)
INR PPP: 1.1
LYMPHOCYTES NFR BLD: 0.8 K/UL (ref 1.5–4)
LYMPHOCYTES RELATIVE PERCENT: 10 % (ref 20–42)
MAGNESIUM SERPL-MCNC: 1.5 MG/DL (ref 1.6–2.6)
MCH RBC QN AUTO: 32.6 PG (ref 26–35)
MCHC RBC AUTO-ENTMCNC: 33.2 G/DL (ref 32–34.5)
MCV RBC AUTO: 98 FL (ref 80–99.9)
MONOCYTES NFR BLD: 0.58 K/UL (ref 0.1–0.95)
MONOCYTES NFR BLD: 7 % (ref 2–12)
NEUTROPHILS NFR BLD: 81 % (ref 43–80)
NEUTS SEG NFR BLD: 6.39 K/UL (ref 1.8–7.3)
PARTIAL THROMBOPLASTIN TIME: 34.1 SEC (ref 24.5–35.1)
PLATELET # BLD AUTO: 258 K/UL (ref 130–450)
PMV BLD AUTO: 11.3 FL (ref 7–12)
POTASSIUM SERPL-SCNC: 3.8 MMOL/L (ref 3.5–5)
PROT SERPL-MCNC: 6 G/DL (ref 6.4–8.3)
PROTHROMBIN TIME: 12.3 SEC (ref 9.3–12.4)
RBC # BLD AUTO: 2.98 M/UL (ref 3.8–5.8)
SODIUM SERPL-SCNC: 137 MMOL/L (ref 132–146)
TROPONIN I SERPL HS-MCNC: 99 NG/L (ref 0–11)
WBC OTHER # BLD: 7.9 K/UL (ref 4.5–11.5)

## 2023-11-30 PROCEDURE — 2720000010 HC SURG SUPPLY STERILE: Performed by: STUDENT IN AN ORGANIZED HEALTH CARE EDUCATION/TRAINING PROGRAM

## 2023-11-30 PROCEDURE — 2580000003 HC RX 258: Performed by: NURSE ANESTHETIST, CERTIFIED REGISTERED

## 2023-11-30 PROCEDURE — 6360000002 HC RX W HCPCS: Performed by: NURSE ANESTHETIST, CERTIFIED REGISTERED

## 2023-11-30 PROCEDURE — C1769 GUIDE WIRE: HCPCS | Performed by: STUDENT IN AN ORGANIZED HEALTH CARE EDUCATION/TRAINING PROGRAM

## 2023-11-30 PROCEDURE — 2709999900 HC NON-CHARGEABLE SUPPLY: Performed by: STUDENT IN AN ORGANIZED HEALTH CARE EDUCATION/TRAINING PROGRAM

## 2023-11-30 PROCEDURE — 85610 PROTHROMBIN TIME: CPT

## 2023-11-30 PROCEDURE — 85730 THROMBOPLASTIN TIME PARTIAL: CPT

## 2023-11-30 PROCEDURE — 93306 TTE W/DOPPLER COMPLETE: CPT | Performed by: INTERNAL MEDICINE

## 2023-11-30 PROCEDURE — 99232 SBSQ HOSP IP/OBS MODERATE 35: CPT | Performed by: FAMILY MEDICINE

## 2023-11-30 PROCEDURE — 93306 TTE W/DOPPLER COMPLETE: CPT

## 2023-11-30 PROCEDURE — 93010 ELECTROCARDIOGRAM REPORT: CPT | Performed by: INTERNAL MEDICINE

## 2023-11-30 PROCEDURE — 87081 CULTURE SCREEN ONLY: CPT

## 2023-11-30 PROCEDURE — 2700000000 HC OXYGEN THERAPY PER DAY

## 2023-11-30 PROCEDURE — 80053 COMPREHEN METABOLIC PANEL: CPT

## 2023-11-30 PROCEDURE — 83735 ASSAY OF MAGNESIUM: CPT

## 2023-11-30 PROCEDURE — 84484 ASSAY OF TROPONIN QUANT: CPT

## 2023-11-30 PROCEDURE — 2500000003 HC RX 250 WO HCPCS: Performed by: NURSE ANESTHETIST, CERTIFIED REGISTERED

## 2023-11-30 PROCEDURE — 2580000003 HC RX 258: Performed by: STUDENT IN AN ORGANIZED HEALTH CARE EDUCATION/TRAINING PROGRAM

## 2023-11-30 PROCEDURE — 99232 SBSQ HOSP IP/OBS MODERATE 35: CPT | Performed by: INTERNAL MEDICINE

## 2023-11-30 PROCEDURE — 85025 COMPLETE CBC W/AUTO DIFF WBC: CPT

## 2023-11-30 PROCEDURE — 2580000003 HC RX 258: Performed by: INTERNAL MEDICINE

## 2023-11-30 PROCEDURE — C1713 ANCHOR/SCREW BN/BN,TIS/BN: HCPCS | Performed by: STUDENT IN AN ORGANIZED HEALTH CARE EDUCATION/TRAINING PROGRAM

## 2023-11-30 PROCEDURE — 85240 CLOT FACTOR VIII AHG 1 STAGE: CPT

## 2023-11-30 PROCEDURE — 6360000002 HC RX W HCPCS: Performed by: FAMILY MEDICINE

## 2023-11-30 PROCEDURE — 3600000004 HC SURGERY LEVEL 4 BASE: Performed by: STUDENT IN AN ORGANIZED HEALTH CARE EDUCATION/TRAINING PROGRAM

## 2023-11-30 PROCEDURE — 6360000002 HC RX W HCPCS: Performed by: STUDENT IN AN ORGANIZED HEALTH CARE EDUCATION/TRAINING PROGRAM

## 2023-11-30 PROCEDURE — 0QH836Z INSERTION OF INTRAMEDULLARY INTERNAL FIXATION DEVICE INTO RIGHT FEMORAL SHAFT, PERCUTANEOUS APPROACH: ICD-10-PCS | Performed by: STUDENT IN AN ORGANIZED HEALTH CARE EDUCATION/TRAINING PROGRAM

## 2023-11-30 PROCEDURE — 2060000000 HC ICU INTERMEDIATE R&B

## 2023-11-30 PROCEDURE — 7100000001 HC PACU RECOVERY - ADDTL 15 MIN: Performed by: STUDENT IN AN ORGANIZED HEALTH CARE EDUCATION/TRAINING PROGRAM

## 2023-11-30 PROCEDURE — 7100000000 HC PACU RECOVERY - FIRST 15 MIN: Performed by: STUDENT IN AN ORGANIZED HEALTH CARE EDUCATION/TRAINING PROGRAM

## 2023-11-30 PROCEDURE — 6370000000 HC RX 637 (ALT 250 FOR IP): Performed by: INTERNAL MEDICINE

## 2023-11-30 PROCEDURE — 36415 COLL VENOUS BLD VENIPUNCTURE: CPT

## 2023-11-30 PROCEDURE — 3700000001 HC ADD 15 MINUTES (ANESTHESIA): Performed by: STUDENT IN AN ORGANIZED HEALTH CARE EDUCATION/TRAINING PROGRAM

## 2023-11-30 PROCEDURE — 6360000002 HC RX W HCPCS: Performed by: INTERNAL MEDICINE

## 2023-11-30 PROCEDURE — 3700000000 HC ANESTHESIA ATTENDED CARE: Performed by: STUDENT IN AN ORGANIZED HEALTH CARE EDUCATION/TRAINING PROGRAM

## 2023-11-30 PROCEDURE — 82962 GLUCOSE BLOOD TEST: CPT

## 2023-11-30 PROCEDURE — 3600000014 HC SURGERY LEVEL 4 ADDTL 15MIN: Performed by: STUDENT IN AN ORGANIZED HEALTH CARE EDUCATION/TRAINING PROGRAM

## 2023-11-30 DEVICE — 10MM/130 DEG TI CANN TFNA 170MM - STERILE
Type: IMPLANTABLE DEVICE | Site: HIP | Status: FUNCTIONAL
Brand: TFN-ADVANCE

## 2023-11-30 DEVICE — TFNA FENESTRATED SCREW 95MM - STERILE
Type: IMPLANTABLE DEVICE | Site: HIP | Status: FUNCTIONAL
Brand: TFN-ADVANCE

## 2023-11-30 DEVICE — SCREW BNE L36MM DIA5MM TIB LT GRN TI ST CANN LOK FULL THRD: Type: IMPLANTABLE DEVICE | Site: HIP | Status: FUNCTIONAL

## 2023-11-30 RX ORDER — SODIUM CHLORIDE 0.9 % (FLUSH) 0.9 %
5-40 SYRINGE (ML) INJECTION PRN
Status: DISCONTINUED | OUTPATIENT
Start: 2023-11-30 | End: 2023-11-30 | Stop reason: HOSPADM

## 2023-11-30 RX ORDER — SODIUM CHLORIDE 9 MG/ML
INJECTION, SOLUTION INTRAVENOUS PRN
Status: DISCONTINUED | OUTPATIENT
Start: 2023-11-30 | End: 2023-11-30 | Stop reason: HOSPADM

## 2023-11-30 RX ORDER — PROPOFOL 10 MG/ML
INJECTION, EMULSION INTRAVENOUS PRN
Status: DISCONTINUED | OUTPATIENT
Start: 2023-11-30 | End: 2023-11-30 | Stop reason: SDUPTHER

## 2023-11-30 RX ORDER — LIDOCAINE HYDROCHLORIDE 20 MG/ML
INJECTION, SOLUTION EPIDURAL; INFILTRATION; INTRACAUDAL; PERINEURAL PRN
Status: DISCONTINUED | OUTPATIENT
Start: 2023-11-30 | End: 2023-11-30 | Stop reason: SDUPTHER

## 2023-11-30 RX ORDER — IPRATROPIUM BROMIDE AND ALBUTEROL SULFATE 2.5; .5 MG/3ML; MG/3ML
1 SOLUTION RESPIRATORY (INHALATION)
Status: DISCONTINUED | OUTPATIENT
Start: 2023-11-30 | End: 2023-11-30 | Stop reason: HOSPADM

## 2023-11-30 RX ORDER — DEXTROSE MONOHYDRATE 100 MG/ML
INJECTION, SOLUTION INTRAVENOUS CONTINUOUS PRN
Status: DISCONTINUED | OUTPATIENT
Start: 2023-11-30 | End: 2023-11-30 | Stop reason: HOSPADM

## 2023-11-30 RX ORDER — ROCURONIUM BROMIDE 10 MG/ML
INJECTION, SOLUTION INTRAVENOUS PRN
Status: DISCONTINUED | OUTPATIENT
Start: 2023-11-30 | End: 2023-11-30 | Stop reason: SDUPTHER

## 2023-11-30 RX ORDER — SODIUM CHLORIDE 0.9 % (FLUSH) 0.9 %
5-40 SYRINGE (ML) INJECTION EVERY 12 HOURS SCHEDULED
Status: DISCONTINUED | OUTPATIENT
Start: 2023-11-30 | End: 2023-11-30 | Stop reason: HOSPADM

## 2023-11-30 RX ORDER — DEXAMETHASONE SODIUM PHOSPHATE 4 MG/ML
INJECTION, SOLUTION INTRA-ARTICULAR; INTRALESIONAL; INTRAMUSCULAR; INTRAVENOUS; SOFT TISSUE PRN
Status: DISCONTINUED | OUTPATIENT
Start: 2023-11-30 | End: 2023-11-30 | Stop reason: SDUPTHER

## 2023-11-30 RX ORDER — FENTANYL CITRATE 50 UG/ML
INJECTION, SOLUTION INTRAMUSCULAR; INTRAVENOUS PRN
Status: DISCONTINUED | OUTPATIENT
Start: 2023-11-30 | End: 2023-11-30 | Stop reason: SDUPTHER

## 2023-11-30 RX ORDER — SODIUM CHLORIDE 9 MG/ML
INJECTION, SOLUTION INTRAVENOUS CONTINUOUS PRN
Status: DISCONTINUED | OUTPATIENT
Start: 2023-11-30 | End: 2023-11-30 | Stop reason: SDUPTHER

## 2023-11-30 RX ORDER — BUPIVACAINE HYDROCHLORIDE 5 MG/ML
INJECTION, SOLUTION EPIDURAL; INTRACAUDAL PRN
Status: DISCONTINUED | OUTPATIENT
Start: 2023-11-30 | End: 2023-11-30 | Stop reason: ALTCHOICE

## 2023-11-30 RX ORDER — PHENYLEPHRINE HCL IN 0.9% NACL 1 MG/10 ML
SYRINGE (ML) INTRAVENOUS PRN
Status: DISCONTINUED | OUTPATIENT
Start: 2023-11-30 | End: 2023-11-30 | Stop reason: SDUPTHER

## 2023-11-30 RX ORDER — DIPHENHYDRAMINE HYDROCHLORIDE 50 MG/ML
12.5 INJECTION INTRAMUSCULAR; INTRAVENOUS
Status: DISCONTINUED | OUTPATIENT
Start: 2023-11-30 | End: 2023-11-30 | Stop reason: HOSPADM

## 2023-11-30 RX ORDER — FENTANYL CITRATE 50 UG/ML
25 INJECTION, SOLUTION INTRAMUSCULAR; INTRAVENOUS EVERY 5 MIN PRN
Status: DISCONTINUED | OUTPATIENT
Start: 2023-11-30 | End: 2023-11-30 | Stop reason: HOSPADM

## 2023-11-30 RX ADMIN — Medication 100 MCG: at 16:41

## 2023-11-30 RX ADMIN — SUGAMMADEX 200 MG: 100 INJECTION, SOLUTION INTRAVENOUS at 17:15

## 2023-11-30 RX ADMIN — SODIUM CHLORIDE, PRESERVATIVE FREE 10 ML: 5 INJECTION INTRAVENOUS at 11:37

## 2023-11-30 RX ADMIN — HYDROMORPHONE HYDROCHLORIDE 0.5 MG: 1 INJECTION, SOLUTION INTRAMUSCULAR; INTRAVENOUS; SUBCUTANEOUS at 06:10

## 2023-11-30 RX ADMIN — PROPOFOL 150 MG: 10 INJECTION, EMULSION INTRAVENOUS at 16:15

## 2023-11-30 RX ADMIN — Medication 100 MCG: at 16:22

## 2023-11-30 RX ADMIN — HYDROMORPHONE HYDROCHLORIDE 0.5 MG: 1 INJECTION, SOLUTION INTRAMUSCULAR; INTRAVENOUS; SUBCUTANEOUS at 01:45

## 2023-11-30 RX ADMIN — HYDROMORPHONE HYDROCHLORIDE 0.5 MG: 1 INJECTION, SOLUTION INTRAMUSCULAR; INTRAVENOUS; SUBCUTANEOUS at 19:49

## 2023-11-30 RX ADMIN — ROCURONIUM BROMIDE 40 MG: 10 INJECTION, SOLUTION INTRAVENOUS at 16:16

## 2023-11-30 RX ADMIN — LIDOCAINE HYDROCHLORIDE 100 MG: 20 INJECTION, SOLUTION EPIDURAL; INFILTRATION; INTRACAUDAL; PERINEURAL at 16:15

## 2023-11-30 RX ADMIN — HYDROMORPHONE HYDROCHLORIDE 0.5 MG: 1 INJECTION, SOLUTION INTRAMUSCULAR; INTRAVENOUS; SUBCUTANEOUS at 04:11

## 2023-11-30 RX ADMIN — FENTANYL CITRATE 50 MCG: 50 INJECTION, SOLUTION INTRAMUSCULAR; INTRAVENOUS at 16:14

## 2023-11-30 RX ADMIN — WATER 2000 MG: 1 INJECTION INTRAMUSCULAR; INTRAVENOUS; SUBCUTANEOUS at 16:21

## 2023-11-30 RX ADMIN — ONDANSETRON 4 MG: 2 INJECTION INTRAMUSCULAR; INTRAVENOUS at 07:25

## 2023-11-30 RX ADMIN — SODIUM CHLORIDE, PRESERVATIVE FREE 10 ML: 5 INJECTION INTRAVENOUS at 04:11

## 2023-11-30 RX ADMIN — OXYCODONE AND ACETAMINOPHEN 1 TABLET: 5; 325 TABLET ORAL at 11:35

## 2023-11-30 RX ADMIN — Medication 200 MCG: at 16:17

## 2023-11-30 RX ADMIN — SODIUM CHLORIDE, PRESERVATIVE FREE 10 ML: 5 INJECTION INTRAVENOUS at 14:20

## 2023-11-30 RX ADMIN — SODIUM CHLORIDE, PRESERVATIVE FREE 10 ML: 5 INJECTION INTRAVENOUS at 01:45

## 2023-11-30 RX ADMIN — FENTANYL CITRATE 50 MCG: 50 INJECTION, SOLUTION INTRAMUSCULAR; INTRAVENOUS at 16:04

## 2023-11-30 RX ADMIN — HYDROMORPHONE HYDROCHLORIDE 0.5 MG: 1 INJECTION, SOLUTION INTRAMUSCULAR; INTRAVENOUS; SUBCUTANEOUS at 12:52

## 2023-11-30 RX ADMIN — Medication 200 MCG: at 16:38

## 2023-11-30 RX ADMIN — Medication 100 MCG: at 16:23

## 2023-11-30 RX ADMIN — SODIUM CHLORIDE, PRESERVATIVE FREE 10 ML: 5 INJECTION INTRAVENOUS at 19:48

## 2023-11-30 RX ADMIN — SODIUM CHLORIDE, PRESERVATIVE FREE 10 ML: 5 INJECTION INTRAVENOUS at 06:10

## 2023-11-30 RX ADMIN — OXYCODONE AND ACETAMINOPHEN 1 TABLET: 5; 325 TABLET ORAL at 22:32

## 2023-11-30 RX ADMIN — MAGNESIUM SULFATE HEPTAHYDRATE 2000 MG: 40 INJECTION, SOLUTION INTRAVENOUS at 13:01

## 2023-11-30 RX ADMIN — Medication 200 MCG: at 16:20

## 2023-11-30 RX ADMIN — SODIUM CHLORIDE, PRESERVATIVE FREE 10 ML: 5 INJECTION INTRAVENOUS at 08:56

## 2023-11-30 RX ADMIN — SODIUM CHLORIDE: 9 INJECTION, SOLUTION INTRAVENOUS at 16:02

## 2023-11-30 RX ADMIN — HYDROMORPHONE HYDROCHLORIDE 0.5 MG: 1 INJECTION, SOLUTION INTRAMUSCULAR; INTRAVENOUS; SUBCUTANEOUS at 08:55

## 2023-11-30 RX ADMIN — DEXAMETHASONE SODIUM PHOSPHATE 10 MG: 4 INJECTION, SOLUTION INTRAMUSCULAR; INTRAVENOUS at 16:20

## 2023-11-30 ASSESSMENT — PAIN DESCRIPTION - LOCATION
LOCATION: HIP

## 2023-11-30 ASSESSMENT — PAIN DESCRIPTION - DESCRIPTORS
DESCRIPTORS: ACHING;SORE;STABBING
DESCRIPTORS: ACHING;DISCOMFORT;SORE
DESCRIPTORS: ACHING;SORE
DESCRIPTORS: ACHING
DESCRIPTORS: DISCOMFORT
DESCRIPTORS: ACHING
DESCRIPTORS: ACHING
DESCRIPTORS: ACHING;SORE;TENDER
DESCRIPTORS: SORE;DISCOMFORT;TENDER

## 2023-11-30 ASSESSMENT — PAIN SCALES - GENERAL
PAINLEVEL_OUTOF10: 0
PAINLEVEL_OUTOF10: 10
PAINLEVEL_OUTOF10: 8
PAINLEVEL_OUTOF10: 7
PAINLEVEL_OUTOF10: 5
PAINLEVEL_OUTOF10: 4
PAINLEVEL_OUTOF10: 8
PAINLEVEL_OUTOF10: 9
PAINLEVEL_OUTOF10: 6
PAINLEVEL_OUTOF10: 10
PAINLEVEL_OUTOF10: 0

## 2023-11-30 ASSESSMENT — PAIN DESCRIPTION - ORIENTATION
ORIENTATION: RIGHT

## 2023-11-30 ASSESSMENT — PAIN - FUNCTIONAL ASSESSMENT
PAIN_FUNCTIONAL_ASSESSMENT: PREVENTS OR INTERFERES SOME ACTIVE ACTIVITIES AND ADLS
PAIN_FUNCTIONAL_ASSESSMENT: ACTIVITIES ARE NOT PREVENTED
PAIN_FUNCTIONAL_ASSESSMENT: PREVENTS OR INTERFERES SOME ACTIVE ACTIVITIES AND ADLS
PAIN_FUNCTIONAL_ASSESSMENT: PREVENTS OR INTERFERES SOME ACTIVE ACTIVITIES AND ADLS
PAIN_FUNCTIONAL_ASSESSMENT: 0-10

## 2023-11-30 ASSESSMENT — PAIN DESCRIPTION - PAIN TYPE: TYPE: SURGICAL PAIN

## 2023-11-30 ASSESSMENT — PAIN DESCRIPTION - FREQUENCY: FREQUENCY: INTERMITTENT

## 2023-11-30 ASSESSMENT — PAIN DESCRIPTION - ONSET: ONSET: ON-GOING

## 2023-11-30 NOTE — ACP (ADVANCE CARE PLANNING)
Advance Care Planning   Healthcare Decision Maker:    Primary Decision Maker: Judd Gretchen - [facility] - [phone]    Click here to complete Healthcare Decision Makers including selection of the Healthcare Decision Maker Relationship (ie \"Primary\").

## 2023-11-30 NOTE — ANESTHESIA POSTPROCEDURE EVALUATION
Department of Anesthesiology  Postprocedure Note    Patient: Meghan Kulkarni  MRN: [id number]  YOB: 1950  Date of evaluation: 11/30/2023      Procedure Summary     Date: 11/30/23 Room / Location: [facility] OR 01 / [facility]    Anesthesia Start: 2933 Anesthesia Stop: 0301    Procedure: RIGHT HIP INTRAMEDULLARY  NAIL INSERTION (Right) Diagnosis:       Closed right hip fracture, initial encounter ([address])      (Closed right hip fracture, initial encounter ([address]) [S72.001A])    Surgeons: Claudia Zaman DO Responsible Provider: King Aleyda DO    Anesthesia Type: general ASA Status: 4          Anesthesia Type: No value filed.     Kayleigh Phase I: Kayleigh Score: 8    Kayleigh Phase II:        Anesthesia Post Evaluation    Patient location during evaluation: PACU  Patient participation: complete - patient participated  Level of consciousness: awake  Airway patency: patent  Nausea & Vomiting: no nausea and no vomiting  Complications: no  Cardiovascular status: hemodynamically stable  Respiratory status: acceptable  Hydration status: stable  Pain management: adequate

## 2023-11-30 NOTE — PROGRESS NOTES
INPATIENT CARDIOLOGY FOLLOW-UP    Name: Romel Louise    Age: 68 y.o. Date of Admission: 11/29/2023  7:58 AM    Date of Service: 11/30/2023    Primary Cardiologist: New to me    Chief Complaint: Follow-up for elevated troponins    Interim History:  No new overnight cardiac complaints. Currently with no complaints of CP, SOB, palpitations, dizziness, or lightheadedness. SR on telemetry. 1 episode of PSVT-asymptomatic. Occasional PVCs.     Review of Systems:   Negative except as described above    Problem List:  Patient Active Problem List   Diagnosis    Chronic anemia    Hemorrhagic complications of dental implant placement    BPH (benign prostatic hyperplasia)    Aspiration pneumonia ([address])    T12 compression fracture, initial encounter ([address])    Lumbar facet arthropathy    Lumbar disc disorder    Lumbar spondylosis    Closed compression fracture of body of L1 vertebra (HCC)    Chronic pain syndrome    Spinal stenosis of lumbar region without neurogenic claudication    Hip fracture requiring operative repair, left, closed, initial encounter ([address])    Preop cardiovascular exam    NSTEMI (non-ST elevated myocardial infarction) ([address])    Primary hypertension    Closed right hip fracture, initial encounter ([address])       Current Medications:    Current Facility-Administered Medications:     sodium chloride flush 0.9 % injection 5-40 mL, 5-40 mL, IntraVENous, 2 times per day, Regina, Milo, DO    sodium chloride flush 0.9 % injection 5-40 mL, 5-40 mL, IntraVENous, PRN, Regina, Milo, DO    0.9 % sodium chloride infusion, , IntraVENous, PRN, Regina, Milo, DO    ceFAZolin (ANCEF) 2,000 mg in sterile water 20 mL IV syringe, 2,000 mg, IntraVENous, See Admin Instructions, Regina, Milo, DO    0.9 % sodium chloride infusion, , IntraVENous, Once, Xavi Tejeda, DO    perflutren lipid microspheres (DEFINITY) injection 1.5 mL, 1.5 mL, IntraVENous, ONCE PRN, IVAN Aleman CNP    calcium elemental (OSCAL) tablet 500 mg,

## 2023-11-30 NOTE — PLAN OF CARE
Problem: Discharge Planning  Goal: Discharge to home or other facility with appropriate resources  Outcome: Progressing  Flowsheets (Taken 11/30/2023 0425 by Jagruti Kirk RN)  Discharge to home or other facility with appropriate resources: Identify barriers to discharge with patient and caregiver     Problem: Pain  Goal: Verbalizes/displays adequate comfort level or baseline comfort level  Outcome: Progressing     Problem: ABCDS Injury Assessment  Goal: Absence of physical injury  Outcome: Progressing     Problem: Safety - Adult  Goal: Free from fall injury  Outcome: Progressing

## 2023-11-30 NOTE — CARE COORDINATION
Social work:    Mr. Huey Graff fell at home and fractured his right hip. He is scheduled for an IM nail insertion today pending medical clearance from cardiology. Social service met with Mr. Huey Graff Benay Kawasaki) and his wife Sherif George (goes by [address]"), advised them about social service role, as well as discussed the discharge process. Mr. Huey Graff is a patient of [facility] and uses [employer] in [facility]. He was independent with ADL's prior to admission and has a cane & wheeled walker. Don's home is one floor with one entry step & a sunk-in family room. He & Maxine Torre are receptive to recommendations of snf vs HHC & DME (will need 3-1 commode). Social work provided a snf and [address] [id number] [address] list of choices and will follow-up tomorrow post surgery/therapy. Patient & wife are aware of possible weekend discharge to home vs snf if stable.     Electronically signed by REZA Guzman on 11/30/2023 at 12:09 PM

## 2023-11-30 NOTE — PROGRESS NOTES
[facility] Progress Note    Admitting Date and Time: 11/29/2023  7:58 AM  Admit Dx: Hypokalemia [E87.6]  Elevated troponin [R79.89]  NSTEMI (non-ST elevated myocardial infarction) ([address]) [I21.4]  Chronic anemia [D64.9]  Hip fracture requiring operative repair, left, closed, initial encounter ([address]) [S72.002A]  Closed intertrochanteric fracture, right, initial encounter ([address]) [S72.141A]    Subjective:  Patient is being followed for Hypokalemia [E87.6]  Elevated troponin [R79.89]  NSTEMI (non-ST elevated myocardial infarction) ([address]) [I21.4]  Chronic anemia [D64.9]  Hip fracture requiring operative repair, left, closed, initial encounter ([address]) [S72.002A]  Closed intertrochanteric fracture, right, initial encounter ([address]) Praveen Campbell     Pt states the Dilaudid helped greatly with his pain overnight, was comfortable most of the nite. No events. Denies any chest pain or pressure. No SOB. No cough or wheezing. No fevers.   NPO for probable OR today    Per RN: nothing to report    ROS: denies fever, chills, cp, sob, n/v, HA unless stated above.      sodium chloride flush  5-40 mL IntraVENous 2 times per day    ceFAZolin (ANCEF) IVPB  2,000 mg IntraVENous See Admin Instructions    calcium elemental  500 mg Oral Daily    sodium chloride flush  5-40 mL IntraVENous 2 times per day    enoxaparin  40 mg SubCUTAneous Daily    valsartan  320 mg Oral Daily    And    hydroCHLOROthiazide  12.5 mg Oral Daily     sodium chloride flush, 5-40 mL, PRN  sodium chloride, , PRN  perflutren lipid microspheres, 1.5 mL, ONCE PRN  sodium chloride flush, 5-40 mL, PRN  sodium chloride, , PRN  potassium chloride, 40 mEq, PRN   Or  potassium alternative oral replacement, 40 mEq, PRN   Or  potassium chloride, 10 mEq, PRN  magnesium sulfate, 2,000 mg, PRN  ondansetron, 4 mg, Q8H PRN   Or  ondansetron, 4 mg, Q6H PRN  polyethylene glycol, 17 g, Daily PRN  acetaminophen, 650 mg, Q6H PRN   Or  acetaminophen, 650 mg, Q6H

## 2023-12-01 LAB
ANION GAP SERPL CALCULATED.3IONS-SCNC: 10 MMOL/L (ref 7–16)
BASOPHILS # BLD: 0.01 K/UL (ref 0–0.2)
BASOPHILS NFR BLD: 0 % (ref 0–2)
BUN SERPL-MCNC: 31 MG/DL (ref 6–23)
CALCIUM SERPL-MCNC: 8.8 MG/DL (ref 8.6–10.2)
CHLORIDE SERPL-SCNC: 106 MMOL/L (ref 98–107)
CO2 SERPL-SCNC: 20 MMOL/L (ref 22–29)
CREAT SERPL-MCNC: 1.2 MG/DL (ref 0.7–1.2)
EOSINOPHIL # BLD: 0 K/UL (ref 0.05–0.5)
EOSINOPHILS RELATIVE PERCENT: 0 % (ref 0–6)
ERYTHROCYTE [DISTWIDTH] IN BLOOD BY AUTOMATED COUNT: 12.4 % (ref 11.5–15)
GFR SERPL CREATININE-BSD FRML MDRD: >60 ML/MIN/1.73M2
GLUCOSE SERPL-MCNC: 188 MG/DL (ref 74–99)
HCT VFR BLD AUTO: 26 % (ref 37–54)
HGB BLD-MCNC: 8.7 G/DL (ref 12.5–16.5)
IMM GRANULOCYTES # BLD AUTO: 0.04 K/UL (ref 0–0.58)
IMM GRANULOCYTES NFR BLD: 0 % (ref 0–5)
LYMPHOCYTES NFR BLD: 0.42 K/UL (ref 1.5–4)
LYMPHOCYTES RELATIVE PERCENT: 4 % (ref 20–42)
MAGNESIUM SERPL-MCNC: 2.1 MG/DL (ref 1.6–2.6)
MCH RBC QN AUTO: 32.3 PG (ref 26–35)
MCHC RBC AUTO-ENTMCNC: 33.5 G/DL (ref 32–34.5)
MCV RBC AUTO: 96.7 FL (ref 80–99.9)
MONOCYTES NFR BLD: 0.32 K/UL (ref 0.1–0.95)
MONOCYTES NFR BLD: 3 % (ref 2–12)
NEUTROPHILS NFR BLD: 92 % (ref 43–80)
NEUTS SEG NFR BLD: 9.68 K/UL (ref 1.8–7.3)
PLATELET # BLD AUTO: 240 K/UL (ref 130–450)
PMV BLD AUTO: 10.8 FL (ref 7–12)
POTASSIUM SERPL-SCNC: 4 MMOL/L (ref 3.5–5)
RBC # BLD AUTO: 2.69 M/UL (ref 3.8–5.8)
RBC # BLD: ABNORMAL 10*6/UL
RBC # BLD: ABNORMAL 10*6/UL
SODIUM SERPL-SCNC: 136 MMOL/L (ref 132–146)
WBC OTHER # BLD: 10.5 K/UL (ref 4.5–11.5)

## 2023-12-01 PROCEDURE — 97161 PT EVAL LOW COMPLEX 20 MIN: CPT

## 2023-12-01 PROCEDURE — 2700000000 HC OXYGEN THERAPY PER DAY

## 2023-12-01 PROCEDURE — 2060000000 HC ICU INTERMEDIATE R&B

## 2023-12-01 PROCEDURE — 85025 COMPLETE CBC W/AUTO DIFF WBC: CPT

## 2023-12-01 PROCEDURE — 2580000003 HC RX 258: Performed by: STUDENT IN AN ORGANIZED HEALTH CARE EDUCATION/TRAINING PROGRAM

## 2023-12-01 PROCEDURE — 6370000000 HC RX 637 (ALT 250 FOR IP): Performed by: STUDENT IN AN ORGANIZED HEALTH CARE EDUCATION/TRAINING PROGRAM

## 2023-12-01 PROCEDURE — 2580000003 HC RX 258: Performed by: INTERNAL MEDICINE

## 2023-12-01 PROCEDURE — 99232 SBSQ HOSP IP/OBS MODERATE 35: CPT | Performed by: INTERNAL MEDICINE

## 2023-12-01 PROCEDURE — 97165 OT EVAL LOW COMPLEX 30 MIN: CPT

## 2023-12-01 PROCEDURE — 6360000002 HC RX W HCPCS: Performed by: STUDENT IN AN ORGANIZED HEALTH CARE EDUCATION/TRAINING PROGRAM

## 2023-12-01 PROCEDURE — 80048 BASIC METABOLIC PNL TOTAL CA: CPT

## 2023-12-01 PROCEDURE — 6370000000 HC RX 637 (ALT 250 FOR IP): Performed by: FAMILY MEDICINE

## 2023-12-01 PROCEDURE — 83735 ASSAY OF MAGNESIUM: CPT

## 2023-12-01 PROCEDURE — 6370000000 HC RX 637 (ALT 250 FOR IP): Performed by: INTERNAL MEDICINE

## 2023-12-01 PROCEDURE — 6360000002 HC RX W HCPCS: Performed by: FAMILY MEDICINE

## 2023-12-01 PROCEDURE — 97530 THERAPEUTIC ACTIVITIES: CPT

## 2023-12-01 RX ORDER — OXYCODONE HYDROCHLORIDE 5 MG/1
10 TABLET ORAL EVERY 4 HOURS PRN
Status: DISCONTINUED | OUTPATIENT
Start: 2023-12-01 | End: 2023-12-02 | Stop reason: HOSPADM

## 2023-12-01 RX ORDER — OXYCODONE HYDROCHLORIDE 5 MG/1
5 TABLET ORAL EVERY 4 HOURS PRN
Status: DISCONTINUED | OUTPATIENT
Start: 2023-12-01 | End: 2023-12-02 | Stop reason: HOSPADM

## 2023-12-01 RX ORDER — SODIUM CHLORIDE 9 MG/ML
INJECTION, SOLUTION INTRAVENOUS ONCE
Status: COMPLETED | OUTPATIENT
Start: 2023-12-01 | End: 2023-12-02

## 2023-12-01 RX ADMIN — SODIUM CHLORIDE, PRESERVATIVE FREE 10 ML: 5 INJECTION INTRAVENOUS at 08:58

## 2023-12-01 RX ADMIN — ACETAMINOPHEN 650 MG: 325 TABLET ORAL at 06:00

## 2023-12-01 RX ADMIN — CALCIUM 500 MG: 500 TABLET ORAL at 08:57

## 2023-12-01 RX ADMIN — OXYCODONE 10 MG: 5 TABLET ORAL at 20:29

## 2023-12-01 RX ADMIN — SODIUM CHLORIDE, PRESERVATIVE FREE 10 ML: 5 INJECTION INTRAVENOUS at 00:41

## 2023-12-01 RX ADMIN — HYDROMORPHONE HYDROCHLORIDE 0.5 MG: 1 INJECTION, SOLUTION INTRAMUSCULAR; INTRAVENOUS; SUBCUTANEOUS at 18:45

## 2023-12-01 RX ADMIN — WATER 2000 MG: 1 INJECTION INTRAMUSCULAR; INTRAVENOUS; SUBCUTANEOUS at 00:41

## 2023-12-01 RX ADMIN — HYDROMORPHONE HYDROCHLORIDE 0.5 MG: 1 INJECTION, SOLUTION INTRAMUSCULAR; INTRAVENOUS; SUBCUTANEOUS at 00:45

## 2023-12-01 RX ADMIN — WATER 2000 MG: 1 INJECTION INTRAMUSCULAR; INTRAVENOUS; SUBCUTANEOUS at 08:57

## 2023-12-01 RX ADMIN — SODIUM CHLORIDE: 9 INJECTION, SOLUTION INTRAVENOUS at 13:15

## 2023-12-01 RX ADMIN — OXYCODONE AND ACETAMINOPHEN 1 TABLET: 5; 325 TABLET ORAL at 15:03

## 2023-12-01 RX ADMIN — VALSARTAN 320 MG: 320 TABLET ORAL at 10:29

## 2023-12-01 RX ADMIN — HYDROMORPHONE HYDROCHLORIDE 0.5 MG: 1 INJECTION, SOLUTION INTRAMUSCULAR; INTRAVENOUS; SUBCUTANEOUS at 16:20

## 2023-12-01 ASSESSMENT — PAIN DESCRIPTION - FREQUENCY
FREQUENCY: INTERMITTENT
FREQUENCY: INTERMITTENT

## 2023-12-01 ASSESSMENT — PAIN SCALES - GENERAL
PAINLEVEL_OUTOF10: 10
PAINLEVEL_OUTOF10: 7
PAINLEVEL_OUTOF10: 3
PAINLEVEL_OUTOF10: 0
PAINLEVEL_OUTOF10: 5
PAINLEVEL_OUTOF10: 9
PAINLEVEL_OUTOF10: 7
PAINLEVEL_OUTOF10: 1
PAINLEVEL_OUTOF10: 0
PAINLEVEL_OUTOF10: 8
PAINLEVEL_OUTOF10: 0

## 2023-12-01 ASSESSMENT — PAIN DESCRIPTION - ORIENTATION
ORIENTATION: RIGHT

## 2023-12-01 ASSESSMENT — PAIN DESCRIPTION - DESCRIPTORS
DESCRIPTORS: DISCOMFORT
DESCRIPTORS: SORE;SHARP;TENDER
DESCRIPTORS: ACHING
DESCRIPTORS: DISCOMFORT;SORE;TENDER
DESCRIPTORS: DISCOMFORT;SORE;TENDER
DESCRIPTORS: ACHING
DESCRIPTORS: ACHING

## 2023-12-01 ASSESSMENT — PAIN - FUNCTIONAL ASSESSMENT
PAIN_FUNCTIONAL_ASSESSMENT: PREVENTS OR INTERFERES SOME ACTIVE ACTIVITIES AND ADLS

## 2023-12-01 ASSESSMENT — PAIN DESCRIPTION - LOCATION
LOCATION: HIP

## 2023-12-01 ASSESSMENT — PAIN DESCRIPTION - ONSET
ONSET: ON-GOING
ONSET: ON-GOING

## 2023-12-01 ASSESSMENT — PAIN DESCRIPTION - PAIN TYPE
TYPE: SURGICAL PAIN
TYPE: SURGICAL PAIN

## 2023-12-01 NOTE — PROGRESS NOTES
[facility] Hospitalist   Progress Note    Admitting Date and Time: 11/29/2023  7:58 AM  Admit Dx: Hypokalemia [E87.6]  Elevated troponin [R79.89]  NSTEMI (non-ST elevated myocardial infarction) ([address]) [I21.4]  Chronic anemia [D64.9]  Hip fracture requiring operative repair, left, closed, initial encounter ([address]) [S72.002A]  Closed intertrochanteric fracture, right, initial encounter ([address]) [S72.141A]    Subjective: Came to ED on the 29th following fall, known hypertension, noted in ED with NSTEMI, closed intertrochanteric fracture on the right side, hypokalemia. Seen by cardiology in the ED, demand ischemia, echo requested, admitted by medicine, patient does have chronic anemia requiring transfusions. Seen by orthopedics, patient does have history of Lyme disease, followed by factor VIII deficiency, managed by [facility]. Tobacco use until 2015. Echo showed EF of 55-60%, does have normal diastolic function. 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 Patient did undergo surgical management in the form of right intramedullary trochanteric nail. Orthopedic recommends aspirin 81 mg daily for 4 weeks, however deferred to medicine. Did speak to oncology, okay with baby aspirin, patient will need to watch for hemoglobin. Did speak to orthopedics who plans to see patient back in office in 2 weeks. Patient is awake, alert, sitting in chair, communicates well, feels a lot better, okay with the plan of possible DC if he does well with physical therapy, does see doctors in his office, he knows hemoglobin will be repeated in 1 week. With PCP follow-up. Patient was admitted with Hypokalemia [E87.6]  Elevated troponin [R79.89]  NSTEMI (non-ST elevated myocardial infarction) ([address]) [I21.4]  Chronic anemia [D64.9]  Hip fracture requiring operative repair, left, closed, initial encounter ([address]) [S72.002A]  Closed intertrochanteric fracture, right, initial encounter ([address]) Anderson Davila. carotid bruits  Abdomen: soft, non-tender, non-distended, normal bowel sounds, no masses or organomegaly      Recent Labs     11/29/23  0845 11/30/23  0905    137   K 3.4* 3.8   * 106   CO2 19* 20*   BUN 25* 22   CREATININE 1.1 1.0   GLUCOSE 93 90   CALCIUM 9.4 9.2       Recent Labs     11/29/23  0845 11/30/23  0905   WBC 10.9 7.9   RBC 3.26* 2.98*   HGB 10.6* 9.7*   HCT 31.5* 29.2*   MCV 96.6 98.0   MCH 32.5 32.6   MCHC 33.7 33.2   RDW 12.4 12.5    258   MPV 10.5 11.3     Hemoglobin 9.7 on 30th, today's lab work pending    Radiology:   [address]   Final Result   Intraprocedural fluoroscopic spot images as above. See separate procedure   report for more information. CT HIP RIGHT WO CONTRAST   Final Result   Comminuted impacted fracture of the right basicervical region femoral neck   with intertrochanteric extension. Fragmentation of the lateral aspect   without significant angulation. Right femoral head remains well located. CT CSpine W/O Contrast   Final Result   1. There is no acute compression fracture or subluxation of the cervical   spine. 2. Multilevel degenerative disc and degenerative joint disease. CT Head W/O Contrast   Final Result   1. There is no acute intracranial abnormality. Specifically, there is no   intracranial hemorrhage. 2. Atrophy and periventricular leukomalacia,   . XR HIP 2-3 VW W PELVIS RIGHT   Final Result   1. There is shortening of the right femoral neck and I suspect that there is   a fracture through the base of the femoral neck/trochanter interface. Dedicated CT of the right hip is recommended for further evaluation. XR FEMUR RIGHT (MIN 2 VIEWS)   Final Result   Suspected fracture through the distal right femoral neck. Dedicated CT of   the right hip is recommended.              Assessment:    Principal Problem:    Hip fracture requiring operative repair, left, closed, initial encounter

## 2023-12-01 NOTE — CARE COORDINATION
Social Work;    Follow-up visit made with Yanelis Marrero and his daughter Juwan Khan. Social work also spoke with wife Verlena Sandifer via phone. Preference is rehab at [address] [place] [address] , or [facility]. No beds open at [facility] or [facility]. [facility] snf accepted and can take patient on the weekend. Social work also called a tentative referral to [facility] (did not hear back) and [facility] for [employer] and 3-1 commode, if able to return home.   (N-[place], [place], [place] done)    Electronically signed by REZA Staton on 12/1/2023 at 3:57 PM

## 2023-12-01 NOTE — PROGRESS NOTES
Physical Therapy    Initial Assessment     Name: Rosa Joshua  : 1950  MRN: [id number]      Date of Service: 2023    Evaluating PT: Rochelle Mcpherson PT, DPT [id number]      Room #:  4508/8255-U  Diagnosis:  Hypokalemia [E87.6]  Elevated troponin [R79.89]  NSTEMI (non-ST elevated myocardial infarction) [facility]) [I21.4]  Chronic anemia [D64.9]  Hip fracture requiring operative repair, left, closed, initial encounter ([address]) Miquel Mcclendon  Closed intertrochanteric fracture, right, initial encounter ([address]) Austyn Elgin  PMHx/PSHx:   has a past medical history of Blood disorder, Hypertension, and Lyme disease. Procedure/Surgery:  Right Intramedullary trochanteric nail   Precautions:  Fall risk, WBAT R LE, O2  Equipment Needs:  TBD    SUBJECTIVE:    Pt lives with wife in a 1.5 story home with 1-2 stair(s) and 0 rail(s) to enter. Bed and bath are on first floor. Pt ambulated with cane outdoors prior to admission. Pt used no AD in his home. OBJECTIVE:   Initial Evaluation  Date: 23 Treatment Date: Short Term/ Long Term   Goals   AM-PAC 6 Clicks      Was pt agreeable to Eval/treatment? Yes     Does pt have pain? 8/10 pain in R hip with movement     Bed Mobility  Rolling: NT  Supine to sit: Min A  Sit to supine: NT  Scooting: Min A  Rolling: Independent  Supine to sit: Independent  Sit to supine: Independent  Scooting: Independent   Transfers Sit to stand: Min A  Stand to sit: Min A  Stand pivot: Min A with [employer]  Sit to stand: Independent  Stand to sit: Independent  Stand pivot: Mod Independent with [employer]   Ambulation   20 feet with [employer] with Min A  >75 feet with [employer] with Mod Independent    Stair negotiation: ascended and descended NT   4 step(s) with 1 rail(s) with Mod Independent    ROM BUE: Refer to OT note  BLE: WFL     Strength BUE: Refer to OT note  BLE: WFL     Balance Sitting EOB: Independent  Dynamic Standing: Min A with [employer]  Sitting EOB: NA  Dynamic Standing:  Mod Independent with [employer]     Pt is A & O x: 4 throughout session. Skillful positioning in bed to protect skin/joint integrity. Education: Pt was educated on PT role in acute care setting and WB status. Pt's/family goals:  1. To return to PLOF. Prognosis is Fair for reaching above PT goals. Patient and or family understand(s) diagnosis, prognosis, and plan of care. Yes    PHYSICAL THERAPY PLAN OF CARE:    PT POC is established based on physician order and patient diagnosis     Referring provider/PT Order:    Start   Ordering Provider    11/29/23 1615  PT evaluation and treat  Start:  11/29/23 1615,   End:  11/29/23 1615,   ONE TIME,   Standing Count:  1 Occurrences,   R        Order went unreviewed at transfer on u Nov 30, 2023  6:40 PM    IVAN Marcano NP      Diagnosis:  Hypokalemia [E87.6]  Elevated troponin [R79.89]  NSTEMI (non-ST elevated myocardial infarction) [facility]) [I21.4]  Chronic anemia [D64.9]  Hip fracture requiring operative repair, left, closed, initial encounter ([address]) [S72.002A]  Closed intertrochanteric fracture, right, initial encounter ([address]) Amy Maciel  Specific instructions for next treatment:  Progress ambulation, trial stairs.     Current Treatment Recommendations:     [x] Strengthening to improve independence with functional mobility   [] ROM to improve independence with functional mobility   [x] Balance Training to improve static/dynamic balance and to reduce fall risk  [x] Endurance Training to improve activity tolerance during functional mobility   [x] Transfer Training to improve safety and independence with all functional transfers   [x] Gait Training to improve gait mechanics, endurance and assess need for appropriate assistive device  [x] Stair Training in preparation for safe discharge home and/or into the community   [] Positioning to prevent skin breakdown and contractures  [x] Safety and Education Training   [x] Patient/Caregiver Education   [] HEP  [] Other     PT long term treatment goals are located

## 2023-12-01 NOTE — PROGRESS NOTES
Occupational Therapy    OCCUPATIONAL THERAPY INITIAL EVALUATION    [place] [address]   [address] [place], [place]         HXYB:                                                  Patient Name: Chantal Wahl    MRN: [id number]    : 1950    Room: [address] [place] [address]      Evaluating OT: Leeanne Arreola OTR/L   [id number]      Referring IVAN Breaux NP    Specific Provider Orders/Date:OT eval and treat 2023      Diagnosis:  Hypokalemia [E87.6]  Elevated troponin [R79.89]  NSTEMI (non-ST elevated myocardial infarction) [facility]) [I21.4]  Chronic anemia [D64.9]  Hip fracture requiring operative repair, left, closed, initial encounter [facility]) Lisa Corey  Closed intertrochanteric fracture, right, initial encounter [facility]) Michelle Adriano  Surgery:  Right Intramedullary trochanteric nail    Precautions:  Fall Risk, WBAT R LE      Assessment of current deficits    [x] Functional mobility  [x]ADLs  [x] Strength               []Cognition    [x] Functional transfers   [] IADLs         [x] Safety Awareness   [x]Endurance    [] Fine Coordination              [x] Balance      [] Vision/perception   [x]Sensation     []Gross Motor Coordination  [] ROM  [] Delirium                   [] Motor Control     OT PLAN OF CARE   OT POC based on physician orders, patient diagnosis and results of clinical assessment    Frequency/Duration  2-4 days/wk for 2 weeks PRN   Specific OT Treatment Interventions to include:   ADL retraining/adapted techniques and AE recommendations to increase functional independence within precautions                    Energy conservation techniques to improve tolerance for selfcare routine   Functional transfer/mobility training/DME recommendations for increased independence, safety and fall prevention         Patient/family education to increase safety and functional independence             Environmental modifications for safe mobility

## 2023-12-01 NOTE — PROGRESS NOTES
Educated patient on the importance of Incentive Spirometer, Patient returned demonstration of 1500, tolerated 1500 cough deep breathing done well. Dangled at bedside became dizzy back in bed. Hourly rounds contnuied. Aleksandar Lara

## 2023-12-01 NOTE — PROGRESS NOTES
Spoke to [facility] [employer], who will accept patient if dispo is home. Notify on call tomorrow if patient is going home vs snf: [phone].

## 2023-12-02 VITALS
SYSTOLIC BLOOD PRESSURE: 103 MMHG | RESPIRATION RATE: 16 BRPM | HEIGHT: 69 IN | OXYGEN SATURATION: 94 % | WEIGHT: 165 LBS | BODY MASS INDEX: 24.44 KG/M2 | HEART RATE: 92 BPM | TEMPERATURE: 98.2 F | DIASTOLIC BLOOD PRESSURE: 55 MMHG

## 2023-12-02 LAB
BASOPHILS # BLD: 0.08 K/UL (ref 0–0.2)
BASOPHILS NFR BLD: 1 % (ref 0–2)
EOSINOPHIL # BLD: 0.08 K/UL (ref 0.05–0.5)
EOSINOPHILS RELATIVE PERCENT: 1 % (ref 0–6)
ERYTHROCYTE [DISTWIDTH] IN BLOOD BY AUTOMATED COUNT: 12.5 % (ref 11.5–15)
FACTOR VIII ACTIVITY: 128 % (ref 50–150)
HCT VFR BLD AUTO: 28 % (ref 37–54)
HGB BLD-MCNC: 9.2 G/DL (ref 12.5–16.5)
LYMPHOCYTES NFR BLD: 1.18 K/UL (ref 1.5–4)
LYMPHOCYTES RELATIVE PERCENT: 12 % (ref 20–42)
MCH RBC QN AUTO: 32.1 PG (ref 26–35)
MCHC RBC AUTO-ENTMCNC: 32.9 G/DL (ref 32–34.5)
MCV RBC AUTO: 97.6 FL (ref 80–99.9)
MICROORGANISM SPEC CULT: NORMAL
MONOCYTES NFR BLD: 0.93 K/UL (ref 0.1–0.95)
MONOCYTES NFR BLD: 10 % (ref 2–12)
NEUTROPHILS NFR BLD: 77 % (ref 43–80)
NEUTS SEG NFR BLD: 7.42 K/UL (ref 1.8–7.3)
PLATELET CONFIRMATION: NORMAL
PLATELET, FLUORESCENCE: 235 K/UL (ref 130–450)
PMV BLD AUTO: 11.3 FL (ref 7–12)
RBC # BLD AUTO: 2.87 M/UL (ref 3.8–5.8)
SPECIMEN DESCRIPTION: NORMAL
WBC OTHER # BLD: 9.7 K/UL (ref 4.5–11.5)

## 2023-12-02 PROCEDURE — 85025 COMPLETE CBC W/AUTO DIFF WBC: CPT

## 2023-12-02 PROCEDURE — 2700000000 HC OXYGEN THERAPY PER DAY

## 2023-12-02 PROCEDURE — 6370000000 HC RX 637 (ALT 250 FOR IP): Performed by: INTERNAL MEDICINE

## 2023-12-02 PROCEDURE — 6370000000 HC RX 637 (ALT 250 FOR IP): Performed by: STUDENT IN AN ORGANIZED HEALTH CARE EDUCATION/TRAINING PROGRAM

## 2023-12-02 PROCEDURE — 97110 THERAPEUTIC EXERCISES: CPT

## 2023-12-02 PROCEDURE — 6360000002 HC RX W HCPCS: Performed by: INTERNAL MEDICINE

## 2023-12-02 PROCEDURE — 97530 THERAPEUTIC ACTIVITIES: CPT

## 2023-12-02 PROCEDURE — 99239 HOSP IP/OBS DSCHRG MGMT >30: CPT | Performed by: INTERNAL MEDICINE

## 2023-12-02 PROCEDURE — 2580000003 HC RX 258: Performed by: INTERNAL MEDICINE

## 2023-12-02 RX ORDER — POLYETHYLENE GLYCOL 3350 17 G/17G
17 POWDER, FOR SOLUTION ORAL DAILY PRN
Qty: 527 G | Refills: 0 | DISCHARGE
Start: 2023-12-02 | End: 2024-02-02

## 2023-12-02 RX ORDER — OXYCODONE HYDROCHLORIDE AND ACETAMINOPHEN 5; 325 MG/1; MG/1
1 TABLET ORAL EVERY 6 HOURS PRN
Qty: 28 TABLET | Refills: 0 | Status: SHIPPED | OUTPATIENT
Start: 2023-12-02 | End: 2023-12-09

## 2023-12-02 RX ORDER — ASPIRIN 81 MG/1
81 TABLET ORAL DAILY
Qty: 30 TABLET | Refills: 0 | DISCHARGE
Start: 2023-12-02

## 2023-12-02 RX ADMIN — OXYCODONE 10 MG: 5 TABLET ORAL at 00:44

## 2023-12-02 RX ADMIN — OXYCODONE 10 MG: 5 TABLET ORAL at 14:08

## 2023-12-02 RX ADMIN — SODIUM CHLORIDE, PRESERVATIVE FREE 10 ML: 5 INJECTION INTRAVENOUS at 08:37

## 2023-12-02 RX ADMIN — POLYETHYLENE GLYCOL 3350 17 G: 17 POWDER, FOR SOLUTION ORAL at 10:07

## 2023-12-02 RX ADMIN — OXYCODONE 10 MG: 5 TABLET ORAL at 05:22

## 2023-12-02 RX ADMIN — CALCIUM 500 MG: 500 TABLET ORAL at 08:36

## 2023-12-02 RX ADMIN — OXYCODONE 10 MG: 5 TABLET ORAL at 10:06

## 2023-12-02 RX ADMIN — ENOXAPARIN SODIUM 40 MG: 100 INJECTION SUBCUTANEOUS at 08:36

## 2023-12-02 ASSESSMENT — PAIN SCALES - GENERAL
PAINLEVEL_OUTOF10: 10
PAINLEVEL_OUTOF10: 5
PAINLEVEL_OUTOF10: 7
PAINLEVEL_OUTOF10: 9

## 2023-12-02 ASSESSMENT — PAIN - FUNCTIONAL ASSESSMENT
PAIN_FUNCTIONAL_ASSESSMENT: ACTIVITIES ARE NOT PREVENTED
PAIN_FUNCTIONAL_ASSESSMENT: PREVENTS OR INTERFERES WITH MANY ACTIVE NOT PASSIVE ACTIVITIES

## 2023-12-02 ASSESSMENT — PAIN DESCRIPTION - LOCATION
LOCATION: HIP

## 2023-12-02 ASSESSMENT — PAIN DESCRIPTION - ORIENTATION
ORIENTATION: RIGHT

## 2023-12-02 ASSESSMENT — PAIN DESCRIPTION - DESCRIPTORS
DESCRIPTORS: SORE
DESCRIPTORS: DISCOMFORT

## 2023-12-02 NOTE — PROGRESS NOTES
RN call out to [facility]: set up transport from patient's room to [facility]. Pickup ETA 1400.

## 2023-12-02 NOTE — PLAN OF CARE
Problem: Discharge Planning  Goal: Discharge to home or other facility with appropriate resources  12/2/2023 1018 by Radha Brown RN  Outcome: Progressing     Problem: Pain  Goal: Verbalizes/displays adequate comfort level or baseline comfort level  12/2/2023 1018 by Radha Brown RN  Outcome: Progressing     Problem: ABCDS Injury Assessment  Goal: Absence of physical injury  12/2/2023 1018 by Radha Brown RN  Outcome: Progressing     Problem: Safety - Adult  Goal: Free from fall injury  12/2/2023 1018 by Radha Brown RN  Outcome: Progressing     Problem: Skin/Tissue Integrity  Goal: Absence of new skin breakdown  Description: 1. Monitor for areas of redness and/or skin breakdown  2. Assess vascular access sites hourly  3. Every 4-6 hours minimum:  Change oxygen saturation probe site  4. Every 4-6 hours:  If on nasal continuous positive airway pressure, respiratory therapy assess nares and determine need for appliance change or resting period.   12/2/2023 1018 by Sherman West RN  Outcome: Progressing

## 2023-12-02 NOTE — PROGRESS NOTES
Physical Therapy  Treatment Note  Name: Praveen Martins  : 1950  MRN: [id number]    Date of Service: 2023    Evaluating PT: Steffi Maravilla PT, DPT [id number]    Room #:  9584/8471-T  Diagnosis:  Hypokalemia [E87.6]  Elevated troponin [R79.89]  NSTEMI (non-ST elevated myocardial infarction) [facility]) [I21.4]  Chronic anemia [D64.9]  Hip fracture requiring operative repair,  right closed, initial encounter ([address]) [facility]  Closed intertrochanteric fracture, right, initial encounter ([address]) Ahmet Srinivasa  PMHx/PSHx:   has a past medical history of Blood disorder, Hypertension, and Lyme disease. Procedure/Surgery:  Right Intramedullary trochanteric nail 23  Precautions:  Fall risk, WBAT RLE  Equipment Needs:  TBD    SUBJECTIVE:    Pt lives with wife in a 1.5 story home with 1-2 stair(s) and 0 rail(s) to enter. Bed and bath are on first floor. Pt ambulated with cane outdoors prior to admission. Pt used no AD in his home. OBJECTIVE:   Initial Evaluation  Date: 23 Treatment  Short Term/ Long Term   Goals   AM-PAC 6 Clicks 71/72     Was pt agreeable to Eval/treatment? Yes yes    Does pt have pain? 8/10 pain in R hip with movement C/o 10/10 R hip pain with movement    Bed Mobility  Rolling: NT  Supine to sit: Min A  Sit to supine: NT  Scooting: Min A Rolling: MIN A  Supine to sit: MOD A  Sit to supine: MIN A  Scooting: MIN A Rolling: Independent  Supine to sit: Independent  Sit to supine: Independent  Scooting: Independent   Transfers Sit to stand: Min A  Stand to sit: Min A  Stand pivot: Min A with [employer] Sit to stand: MIN A  Stand to sit: MIN A  Stand pivot: MIN A with ww Sit to stand: Independent  Stand to sit: Independent  Stand pivot: Mod Independent with [employer]   Ambulation   20 feet with [employer] with Min A 30 feet with ww MIN A >75 feet with WW with Mod Independent    Stair negotiation: ascended and descended NT  NA, pt c/o fatigue and pain R hip that limited amb distance.   4 step(s) with 1 rail(s) with Mod

## 2023-12-02 NOTE — DISCHARGE SUMMARY
[facility] EMERGENCY SERVICE Physician Discharge Summary        [address] [id number] [phone]          Activity level: As tolerated    Diet: ADULT DIET; Regular    Labs:    Dispo: Skilled nursing facility    Condition at discharge: Stable    Continue supplemental oxygen via nasal canula @ 2 LPM round-the-clock. Continue CPAP / BiPAP during sleep as prior to admission. Patient ID:  Elijah Kiser  [id number]  68 y.o.  1950    Admit date: 11/29/2023    Discharge date and time:  12/2/2023  1:17 PM    Admission Diagnoses: Principal Problem:    Hip fracture requiring operative repair, left, closed, initial encounter [facility])  Active Problems:    Chronic anemia    Preop cardiovascular exam    NSTEMI (non-ST elevated myocardial infarction) ([address])    Primary hypertension    Closed right hip fracture, initial encounter ([address])  Resolved Problems:    * No resolved hospital problems. *      Discharge Diagnoses: Principal Problem:    Hip fracture requiring operative repair, left, closed, initial encounter [facility])  Active Problems:    Chronic anemia    Preop cardiovascular exam    NSTEMI (non-ST elevated myocardial infarction) ([address])    Primary hypertension    Closed right hip fracture, initial encounter ([address])  Resolved Problems:    * No resolved hospital problems. *      Consults:  IP CONSULT TO ORTHOPEDIC SURGERY  IP CONSULT TO CARDIOLOGY  IP CONSULT TO CASE MANAGEMENT  IP CONSULT TO HEM/ONC  IP CONSULT TO IV TEAM  IP CONSULT TO IV TEAM    Procedures: Right intramedullary nail. Hospital Course: Patient was admitted with Hypokalemia [E87.6]  Elevated troponin [R79.89]  NSTEMI (non-ST elevated myocardial infarction) ([address]) [I21.4]  Chronic anemia [D64.9]  Hip fracture requiring operative repair, left, closed, initial encounter ([address]) [S72.002A]  Closed intertrochanteric fracture, right, initial encounter ([address]) Derek High.  Patient Came to ED on the 29th Handicap Placard Misc  by Does not apply route Above patient unable to ambulate more than 200 feet without stopping to rest.  Expires: 1-     ibuprofen 800 MG tablet  Commonly known as: ADVIL;MOTRIN     MULTI-VITAMIN DAILY PO     valsartan-hydroCHLOROthiazide 320-12.5 MG per tablet  Commonly known as: DIOVAN-HCT            STOP taking these medications      cyclophosphamide 50 MG Caps capsule  Commonly known as: CYTOXAN     furosemide 40 MG tablet  Commonly known as: LASIX     hydrALAZINE 25 MG tablet  Commonly known as: APRESOLINE     megestrol 40 MG/ML suspension  Commonly known as: MEGACE     mirtazapine 15 MG tablet  Commonly known as: REMERON     OXYCODONE ER PO               Where to Get Your Medications        You can get these medications from any pharmacy    Bring a paper prescription for each of these medications  oxyCODONE-acetaminophen 5-325 MG per tablet       Information about where to get these medications is not yet available    Ask your nurse or doctor about these medications  aspirin 81 MG EC tablet  polyethylene glycol 17 g packet           Note that more than 30 minutes was spent in preparing discharge papers, discussing discharge with patient, medication review, etc.    Signed:  Electronically signed by Eli Torres MD on 12/2/2023 at 1:17 PM    NOTE: This report was transcribed using voice recognition software. Every effort was made to ensure accuracy; however, inadvertent computerized transcription errors may be present.

## 2024-01-01 PROBLEM — Z01.810 PREOP CARDIOVASCULAR EXAM: Status: RESOLVED | Noted: 2023-11-29 | Resolved: 2024-01-01

## 2024-05-03 ENCOUNTER — HOSPITAL ENCOUNTER (OUTPATIENT)
Dept: CT IMAGING | Age: 74
End: 2024-05-03
Payer: MEDICARE

## 2024-05-03 DIAGNOSIS — Z96.641 PAIN DUE TO RIGHT HIP JOINT PROSTHESIS, INITIAL ENCOUNTER (HCC): ICD-10-CM

## 2024-05-03 DIAGNOSIS — T84.84XA PAIN DUE TO RIGHT HIP JOINT PROSTHESIS, INITIAL ENCOUNTER (HCC): ICD-10-CM

## 2024-05-03 PROCEDURE — 73700 CT LOWER EXTREMITY W/O DYE: CPT

## 2024-05-14 ENCOUNTER — HOSPITAL ENCOUNTER (OUTPATIENT)
Dept: PREADMISSION TESTING | Age: 74
Discharge: HOME | End: 2024-05-14
Payer: MEDICARE

## 2024-05-14 VITALS
WEIGHT: 159 LBS | RESPIRATION RATE: 20 BRPM | HEART RATE: 74 BPM | OXYGEN SATURATION: 98 % | DIASTOLIC BLOOD PRESSURE: 67 MMHG | SYSTOLIC BLOOD PRESSURE: 154 MMHG | TEMPERATURE: 98 F | BODY MASS INDEX: 24.96 KG/M2 | HEIGHT: 67 IN

## 2024-05-14 LAB
ANION GAP SERPL CALCULATED.3IONS-SCNC: 9 MMOL/L (ref 7–16)
BUN SERPL-MCNC: 29 MG/DL (ref 6–23)
CALCIUM SERPL-MCNC: 10 MG/DL (ref 8.6–10.2)
CHLORIDE SERPL-SCNC: 108 MMOL/L (ref 98–107)
CO2 SERPL-SCNC: 22 MMOL/L (ref 22–29)
CREAT SERPL-MCNC: 1.2 MG/DL (ref 0.7–1.2)
ERYTHROCYTE [DISTWIDTH] IN BLOOD BY AUTOMATED COUNT: 12.4 % (ref 11.5–15)
GFR, ESTIMATED: 62 ML/MIN/1.73M2
GLUCOSE SERPL-MCNC: 88 MG/DL (ref 74–99)
HCT VFR BLD AUTO: 33.9 % (ref 37–54)
HGB BLD-MCNC: 10.6 G/DL (ref 12.5–16.5)
MCH RBC QN AUTO: 31.5 PG (ref 26–35)
MCHC RBC AUTO-ENTMCNC: 31.3 G/DL (ref 32–34.5)
MCV RBC AUTO: 100.9 FL (ref 80–99.9)
PLATELET # BLD AUTO: 264 K/UL (ref 130–450)
PMV BLD AUTO: 11.2 FL (ref 7–12)
POTASSIUM SERPL-SCNC: 4.4 MMOL/L (ref 3.5–5)
RBC # BLD AUTO: 3.36 M/UL (ref 3.8–5.8)
SODIUM SERPL-SCNC: 139 MMOL/L (ref 132–146)
WBC OTHER # BLD: 6 K/UL (ref 4.5–11.5)

## 2024-05-14 PROCEDURE — 87081 CULTURE SCREEN ONLY: CPT

## 2024-05-14 PROCEDURE — 85027 COMPLETE CBC AUTOMATED: CPT

## 2024-05-14 PROCEDURE — 80048 BASIC METABOLIC PNL TOTAL CA: CPT

## 2024-05-14 PROCEDURE — 36415 COLL VENOUS BLD VENIPUNCTURE: CPT

## 2024-05-14 RX ORDER — VITAMIN B COMPLEX
1 CAPSULE ORAL DAILY
COMMUNITY

## 2024-05-14 RX ORDER — OXYCODONE HYDROCHLORIDE AND ACETAMINOPHEN 5; 325 MG/1; MG/1
1 TABLET ORAL EVERY 4 HOURS PRN
Status: ON HOLD | COMMUNITY
End: 2024-05-18 | Stop reason: HOSPADM

## 2024-05-14 RX ORDER — CALCIUM CARBONATE 500(1250)
500 TABLET ORAL DAILY
COMMUNITY

## 2024-05-14 RX ORDER — MEGESTROL ACETATE 40 MG/ML
200 SUSPENSION ORAL DAILY
COMMUNITY

## 2024-05-14 RX ORDER — HYDRALAZINE HYDROCHLORIDE 25 MG/1
25 TABLET, FILM COATED ORAL 2 TIMES DAILY
COMMUNITY

## 2024-05-14 ASSESSMENT — HOOS JR
GOING UP OR DOWN STAIRS: MODERATE
RISING FROM SITTING: MODERATE
BENDING TO THE FLOOR TO PICK UP OBJECT: SEVERE
WALKING ON UNEVEN SURFACE: MODERATE
HOOS JR RAW SCORE: 13
HOOS JR TOTAL INTERVAL SCORE: 49.858
SITTING: MODERATE
LYING IN BED (TURNING OVER, MAINTAINING HIP POSITION): MODERATE
HOOS JR RAW SCORE: 13

## 2024-05-14 ASSESSMENT — PAIN SCALES - GENERAL: PAINLEVEL_OUTOF10: 8

## 2024-05-14 ASSESSMENT — PAIN DESCRIPTION - FREQUENCY: FREQUENCY: CONTINUOUS

## 2024-05-14 ASSESSMENT — PAIN DESCRIPTION - PAIN TYPE: TYPE: CHRONIC PAIN

## 2024-05-14 ASSESSMENT — PAIN DESCRIPTION - LOCATION: LOCATION: HIP

## 2024-05-14 ASSESSMENT — PAIN - FUNCTIONAL ASSESSMENT: PAIN_FUNCTIONAL_ASSESSMENT: PREVENTS OR INTERFERES WITH MANY ACTIVE NOT PASSIVE ACTIVITIES

## 2024-05-14 ASSESSMENT — PAIN DESCRIPTION - ORIENTATION: ORIENTATION: RIGHT

## 2024-05-14 NOTE — H&P
History and Physical  KNikki Mckee MD    Chief Complaint       Lam, a 73 year old male, is seen today for a right hip pain for a duration of  about 2-3 weeks.  Patient underwent Right hip fx IMN with Dr Tapia on 11/30/23.   Patient rates his pain as 10/10.  Patient states that medication, rest makes the pain better and activity, mornings makes the pain worse.  He arrived today using a walker. Xrays were done on 4/10/24 at [facility].       History of present illness   Lam is a 73 Years Old Male who presents to the office for evaluation of right hip pain beginning 5 months ago. The patient had a fall in November and had a right hip IMN for intertrochanteric fracture with Dr. Tapia. He says that he has had pain since the surgery and as he progressed with therapy he actually developed worsening pain of the right hip. He does admit to low back pain as well but says that this was never a big problem for him before his fall. He ambulated without assistance prior to his fall and was [place] a [place].     Past medical history is significant for HTN and factor 8 deficiency. He says that this is controlled and he follows with a hematologist, Dr. Barnes.   Currently pain is 10 out of 10.  Pain is localized to the groin, thigh, lateral hip, buttock, lower back. Symptoms include instability, stiffness, night pain. Pain is worse with activity, lying flat, stairs, walking and improves with rest.  Previous and current medications include norco  Previous and current treatments include home exercise program, physical therapy, cane, walker, wheel chair    Physical Exam   Awake, alert, oriented x3  No acute distress  Well developed, well nourished  Eyes appear Normal  No obvious abnormalities  No lower extremity edema present  Respiratory effort: non-labored  Skin Appearance: Normal    Right Hip Exam   Skin Exam:    skin intact  Pain with log roll:   yes  Flexion contracture:   no  Painful ROM:   yes  TTP Greater Trochanter:

## 2024-05-14 NOTE — PROGRESS NOTES
CBC, BMP results dated 5/14/2024 [facility] as [facility] as to Dr Mckee's office.     [facility] message at [facility] to report the above.  Spoke to Lisa at Dr Mckee's - reported the above.  
Paper copy Hematology clearance by Dr Barnes and Medical clearance by IVAN Cespedes placed on chart  
dental.    [x]  The Outpatient Pharm[facility] is available to fill your prescription here on your day of surgery, ask your preop nurse for details      EDUCATIONAL MATERIALS PROVIDED:    [x] PAT Preoperative Education Packet/Booklet     [x] Shower with soap, lather and rinse well, and use CHG wipes provided the evening before surgery as instructed    [x] Incentive spirometer with instructions - bring it the day of surgery

## 2024-05-15 LAB
MICROORGANISM SPEC CULT: NORMAL
SPECIMEN DESCRIPTION: NORMAL

## 2024-05-17 ENCOUNTER — APPOINTMENT (OUTPATIENT)
Dept: GENERAL RADIOLOGY | Age: 74
End: 2024-05-17
Attending: ORTHOPAEDIC SURGERY
Payer: MEDICARE

## 2024-05-17 ENCOUNTER — ANESTHESIA (OUTPATIENT)
Dept: OPERATING ROOM | Age: 74
End: 2024-05-17
Payer: MEDICARE

## 2024-05-17 ENCOUNTER — HOSPITAL ENCOUNTER (INPATIENT)
Age: 74
LOS: 1 days | Discharge: HOME | End: 2024-05-18
Attending: ORTHOPAEDIC SURGERY | Admitting: ORTHOPAEDIC SURGERY
Payer: MEDICARE

## 2024-05-17 ENCOUNTER — ANESTHESIA EVENT (OUTPATIENT)
Dept: OPERATING ROOM | Age: 74
End: 2024-05-17
Payer: MEDICARE

## 2024-05-17 ENCOUNTER — HOSPITAL ENCOUNTER (OUTPATIENT)
Dept: GENERAL RADIOLOGY | Age: 74
End: 2024-05-17
Attending: ORTHOPAEDIC SURGERY
Payer: MEDICARE

## 2024-05-17 DIAGNOSIS — S72.141S INTERTROCHANTERIC FRACTURE OF RIGHT HIP, SEQUELA: Primary | ICD-10-CM

## 2024-05-17 DIAGNOSIS — T84.84XA PAIN DUE TO RIGHT HIP JOINT PROSTHESIS, INITIAL ENCOUNTER (HCC): ICD-10-CM

## 2024-05-17 DIAGNOSIS — Z96.641 PAIN DUE TO RIGHT HIP JOINT PROSTHESIS, INITIAL ENCOUNTER (HCC): ICD-10-CM

## 2024-05-17 DIAGNOSIS — R52 PAIN: ICD-10-CM

## 2024-05-17 DIAGNOSIS — S72.141P CLOSED DISPLACED INTERTROCHANTERIC FRACTURE OF RIGHT FEMUR WITH MALUNION: ICD-10-CM

## 2024-05-17 PROCEDURE — C1776 JOINT DEVICE (IMPLANTABLE): HCPCS | Performed by: ORTHOPAEDIC SURGERY

## 2024-05-17 PROCEDURE — 51798 US URINE CAPACITY MEASURE: CPT

## 2024-05-17 PROCEDURE — 2580000003 HC RX 258

## 2024-05-17 PROCEDURE — 6360000002 HC RX W HCPCS: Performed by: ORTHOPAEDIC SURGERY

## 2024-05-17 PROCEDURE — A4216 STERILE WATER/SALINE, 10 ML: HCPCS | Performed by: ANESTHESIOLOGY

## 2024-05-17 PROCEDURE — 2720000010 HC SURG SUPPLY STERILE: Performed by: ORTHOPAEDIC SURGERY

## 2024-05-17 PROCEDURE — 6360000002 HC RX W HCPCS

## 2024-05-17 PROCEDURE — 0QP804Z REMOVAL OF INTERNAL FIXATION DEVICE FROM RIGHT FEMORAL SHAFT, OPEN APPROACH: ICD-10-PCS | Performed by: ORTHOPAEDIC SURGERY

## 2024-05-17 PROCEDURE — 6370000000 HC RX 637 (ALT 250 FOR IP): Performed by: ORTHOPAEDIC SURGERY

## 2024-05-17 PROCEDURE — 73502 X-RAY EXAM HIP UNI 2-3 VIEWS: CPT

## 2024-05-17 PROCEDURE — 6370000000 HC RX 637 (ALT 250 FOR IP)

## 2024-05-17 PROCEDURE — 3600000015 HC SURGERY LEVEL 5 ADDTL 15MIN: Performed by: ORTHOPAEDIC SURGERY

## 2024-05-17 PROCEDURE — 2709999900 HC NON-CHARGEABLE SUPPLY: Performed by: ORTHOPAEDIC SURGERY

## 2024-05-17 PROCEDURE — 2580000003 HC RX 258: Performed by: ORTHOPAEDIC SURGERY

## 2024-05-17 PROCEDURE — 0SR903Z REPLACEMENT OF RIGHT HIP JOINT WITH CERAMIC SYNTHETIC SUBSTITUTE, OPEN APPROACH: ICD-10-PCS | Performed by: ORTHOPAEDIC SURGERY

## 2024-05-17 PROCEDURE — 3700000000 HC ANESTHESIA ATTENDED CARE: Performed by: ORTHOPAEDIC SURGERY

## 2024-05-17 PROCEDURE — 2580000003 HC RX 258: Performed by: ANESTHESIOLOGY

## 2024-05-17 PROCEDURE — 2500000003 HC RX 250 WO HCPCS: Performed by: ANESTHESIOLOGY

## 2024-05-17 PROCEDURE — 7100000000 HC PACU RECOVERY - FIRST 15 MIN: Performed by: ORTHOPAEDIC SURGERY

## 2024-05-17 PROCEDURE — 2060000000 HC ICU INTERMEDIATE R&B

## 2024-05-17 PROCEDURE — 3600000005 HC SURGERY LEVEL 5 BASE: Performed by: ORTHOPAEDIC SURGERY

## 2024-05-17 PROCEDURE — 6360000002 HC RX W HCPCS: Performed by: ANESTHESIOLOGY

## 2024-05-17 PROCEDURE — 7100000001 HC PACU RECOVERY - ADDTL 15 MIN: Performed by: ORTHOPAEDIC SURGERY

## 2024-05-17 PROCEDURE — 2500000003 HC RX 250 WO HCPCS: Performed by: ORTHOPAEDIC SURGERY

## 2024-05-17 PROCEDURE — 2500000003 HC RX 250 WO HCPCS: Performed by: NURSE ANESTHETIST, CERTIFIED REGISTERED

## 2024-05-17 PROCEDURE — A4216 STERILE WATER/SALINE, 10 ML: HCPCS | Performed by: ORTHOPAEDIC SURGERY

## 2024-05-17 PROCEDURE — 2580000003 HC RX 258: Performed by: NURSE ANESTHETIST, CERTIFIED REGISTERED

## 2024-05-17 PROCEDURE — 3700000001 HC ADD 15 MINUTES (ANESTHESIA): Performed by: ORTHOPAEDIC SURGERY

## 2024-05-17 PROCEDURE — 8E0Y0CZ ROBOTIC ASSISTED PROCEDURE OF LOWER EXTREMITY, OPEN APPROACH: ICD-10-PCS | Performed by: ORTHOPAEDIC SURGERY

## 2024-05-17 PROCEDURE — 6360000002 HC RX W HCPCS: Performed by: NURSE ANESTHETIST, CERTIFIED REGISTERED

## 2024-05-17 DEVICE — CERAMIC V40 FEMORAL HEAD
Type: IMPLANTABLE DEVICE | Site: HIP | Status: FUNCTIONAL
Brand: BIOLOX

## 2024-05-17 DEVICE — TRIDENT II TRITANIUM CLUSTER 54E
Type: IMPLANTABLE DEVICE | Site: HIP | Status: FUNCTIONAL
Brand: TRIDENT II

## 2024-05-17 DEVICE — MODULAR HIP SYSTEM
Type: IMPLANTABLE DEVICE | Site: HIP | Status: FUNCTIONAL
Brand: RESTORATION

## 2024-05-17 DEVICE — LINER- CEMENTLESS
Type: IMPLANTABLE DEVICE | Site: HIP | Status: FUNCTIONAL
Brand: MDM

## 2024-05-17 DEVICE — RESTORATION ADM/MDM X3 INSERT
Type: IMPLANTABLE DEVICE | Site: HIP | Status: FUNCTIONAL
Brand: RESTORATION ADM/MDM X3 INSERT

## 2024-05-17 RX ORDER — SODIUM CHLORIDE 9 MG/ML
INJECTION, SOLUTION INTRAVENOUS PRN
Status: DISCONTINUED | OUTPATIENT
Start: 2024-05-17 | End: 2024-05-17 | Stop reason: HOSPADM

## 2024-05-17 RX ORDER — FENTANYL CITRATE 50 UG/ML
INJECTION, SOLUTION INTRAMUSCULAR; INTRAVENOUS PRN
Status: DISCONTINUED | OUTPATIENT
Start: 2024-05-17 | End: 2024-05-17 | Stop reason: SDUPTHER

## 2024-05-17 RX ORDER — TRANEXAMIC ACID 10 MG/ML
1000 INJECTION, SOLUTION INTRAVENOUS
Status: DISCONTINUED | OUTPATIENT
Start: 2024-05-17 | End: 2024-05-17

## 2024-05-17 RX ORDER — LABETALOL HYDROCHLORIDE 5 MG/ML
5 INJECTION, SOLUTION INTRAVENOUS
Status: DISCONTINUED | OUTPATIENT
Start: 2024-05-17 | End: 2024-05-17 | Stop reason: HOSPADM

## 2024-05-17 RX ORDER — ACETAMINOPHEN 500 MG
1000 TABLET ORAL ONCE
Status: COMPLETED | OUTPATIENT
Start: 2024-05-17 | End: 2024-05-17

## 2024-05-17 RX ORDER — LIDOCAINE HYDROCHLORIDE 20 MG/ML
JELLY TOPICAL
Status: DISCONTINUED
Start: 2024-05-17 | End: 2024-05-17 | Stop reason: WASHOUT

## 2024-05-17 RX ORDER — ACETAMINOPHEN 325 MG/1
650 TABLET ORAL EVERY 6 HOURS
Status: DISCONTINUED | OUTPATIENT
Start: 2024-05-17 | End: 2024-05-18 | Stop reason: HOSPADM

## 2024-05-17 RX ORDER — DEXAMETHASONE SODIUM PHOSPHATE 10 MG/ML
10 INJECTION INTRAMUSCULAR; INTRAVENOUS ONCE
Status: COMPLETED | OUTPATIENT
Start: 2024-05-18 | End: 2024-05-18

## 2024-05-17 RX ORDER — SODIUM CHLORIDE 9 MG/ML
INJECTION, SOLUTION INTRAVENOUS CONTINUOUS
Status: ACTIVE | OUTPATIENT
Start: 2024-05-17 | End: 2024-05-18

## 2024-05-17 RX ORDER — KETOROLAC TROMETHAMINE 15 MG/ML
15 INJECTION, SOLUTION INTRAMUSCULAR; INTRAVENOUS EVERY 6 HOURS PRN
Status: DISCONTINUED | OUTPATIENT
Start: 2024-05-17 | End: 2024-05-18 | Stop reason: HOSPADM

## 2024-05-17 RX ORDER — METOCLOPRAMIDE HYDROCHLORIDE 5 MG/ML
10 INJECTION INTRAMUSCULAR; INTRAVENOUS ONCE
Status: COMPLETED | OUTPATIENT
Start: 2024-05-17 | End: 2024-05-17

## 2024-05-17 RX ORDER — ASPIRIN 325 MG
325 TABLET, DELAYED RELEASE (ENTERIC COATED) ORAL DAILY
Status: DISCONTINUED | OUTPATIENT
Start: 2024-05-18 | End: 2024-05-18 | Stop reason: HOSPADM

## 2024-05-17 RX ORDER — MEGESTROL ACETATE 40 MG/ML
200 SUSPENSION ORAL DAILY
Status: DISCONTINUED | OUTPATIENT
Start: 2024-05-17 | End: 2024-05-18 | Stop reason: HOSPADM

## 2024-05-17 RX ORDER — MEPERIDINE HYDROCHLORIDE 25 MG/ML
12.5 INJECTION INTRAMUSCULAR; INTRAVENOUS; SUBCUTANEOUS
Status: DISCONTINUED | OUTPATIENT
Start: 2024-05-17 | End: 2024-05-17 | Stop reason: HOSPADM

## 2024-05-17 RX ORDER — DIPHENHYDRAMINE HYDROCHLORIDE 50 MG/ML
12.5 INJECTION INTRAMUSCULAR; INTRAVENOUS
Status: DISCONTINUED | OUTPATIENT
Start: 2024-05-17 | End: 2024-05-17 | Stop reason: HOSPADM

## 2024-05-17 RX ORDER — HYDRALAZINE HYDROCHLORIDE 25 MG/1
25 TABLET, FILM COATED ORAL 2 TIMES DAILY
Status: DISCONTINUED | OUTPATIENT
Start: 2024-05-17 | End: 2024-05-18 | Stop reason: HOSPADM

## 2024-05-17 RX ORDER — DIPHENHYDRAMINE HYDROCHLORIDE 50 MG/ML
INJECTION INTRAMUSCULAR; INTRAVENOUS PRN
Status: DISCONTINUED | OUTPATIENT
Start: 2024-05-17 | End: 2024-05-17 | Stop reason: SDUPTHER

## 2024-05-17 RX ORDER — SODIUM CHLORIDE, SODIUM LACTATE, POTASSIUM CHLORIDE, CALCIUM CHLORIDE 600; 310; 30; 20 MG/100ML; MG/100ML; MG/100ML; MG/100ML
INJECTION, SOLUTION INTRAVENOUS CONTINUOUS PRN
Status: DISCONTINUED | OUTPATIENT
Start: 2024-05-17 | End: 2024-05-17 | Stop reason: SDUPTHER

## 2024-05-17 RX ORDER — DEXAMETHASONE SODIUM PHOSPHATE 4 MG/ML
INJECTION, SOLUTION INTRA-ARTICULAR; INTRALESIONAL; INTRAMUSCULAR; INTRAVENOUS; SOFT TISSUE PRN
Status: DISCONTINUED | OUTPATIENT
Start: 2024-05-17 | End: 2024-05-17 | Stop reason: SDUPTHER

## 2024-05-17 RX ORDER — NALOXONE HYDROCHLORIDE 0.4 MG/ML
INJECTION, SOLUTION INTRAMUSCULAR; INTRAVENOUS; SUBCUTANEOUS PRN
Status: DISCONTINUED | OUTPATIENT
Start: 2024-05-17 | End: 2024-05-17 | Stop reason: HOSPADM

## 2024-05-17 RX ORDER — ONDANSETRON 2 MG/ML
4 INJECTION INTRAMUSCULAR; INTRAVENOUS EVERY 6 HOURS PRN
Status: DISCONTINUED | OUTPATIENT
Start: 2024-05-17 | End: 2024-05-18 | Stop reason: HOSPADM

## 2024-05-17 RX ORDER — TRAMADOL HYDROCHLORIDE 50 MG/1
50 TABLET ORAL EVERY 6 HOURS
Status: DISCONTINUED | OUTPATIENT
Start: 2024-05-17 | End: 2024-05-18 | Stop reason: HOSPADM

## 2024-05-17 RX ORDER — GLYCOPYRROLATE 0.2 MG/ML
INJECTION INTRAMUSCULAR; INTRAVENOUS PRN
Status: DISCONTINUED | OUTPATIENT
Start: 2024-05-17 | End: 2024-05-17 | Stop reason: SDUPTHER

## 2024-05-17 RX ORDER — SODIUM CHLORIDE 9 MG/ML
INJECTION, SOLUTION INTRAVENOUS CONTINUOUS
Status: DISCONTINUED | OUTPATIENT
Start: 2024-05-17 | End: 2024-05-17 | Stop reason: HOSPADM

## 2024-05-17 RX ORDER — HYDROMORPHONE HYDROCHLORIDE 2 MG/ML
INJECTION, SOLUTION INTRAMUSCULAR; INTRAVENOUS; SUBCUTANEOUS PRN
Status: DISCONTINUED | OUTPATIENT
Start: 2024-05-17 | End: 2024-05-17 | Stop reason: SDUPTHER

## 2024-05-17 RX ORDER — ONDANSETRON 2 MG/ML
INJECTION INTRAMUSCULAR; INTRAVENOUS PRN
Status: DISCONTINUED | OUTPATIENT
Start: 2024-05-17 | End: 2024-05-17 | Stop reason: SDUPTHER

## 2024-05-17 RX ORDER — SENNA AND DOCUSATE SODIUM 50; 8.6 MG/1; MG/1
1 TABLET, FILM COATED ORAL 2 TIMES DAILY
Status: DISCONTINUED | OUTPATIENT
Start: 2024-05-17 | End: 2024-05-18 | Stop reason: HOSPADM

## 2024-05-17 RX ORDER — DEXAMETHASONE SODIUM PHOSPHATE 10 MG/ML
8 INJECTION, SOLUTION INTRAMUSCULAR; INTRAVENOUS ONCE
Status: DISCONTINUED | OUTPATIENT
Start: 2024-05-17 | End: 2024-05-17 | Stop reason: HOSPADM

## 2024-05-17 RX ORDER — SODIUM CHLORIDE 0.9 % (FLUSH) 0.9 %
5-40 SYRINGE (ML) INJECTION PRN
Status: DISCONTINUED | OUTPATIENT
Start: 2024-05-17 | End: 2024-05-18 | Stop reason: HOSPADM

## 2024-05-17 RX ORDER — PROCHLORPERAZINE EDISYLATE 5 MG/ML
5 INJECTION INTRAMUSCULAR; INTRAVENOUS
Status: DISCONTINUED | OUTPATIENT
Start: 2024-05-17 | End: 2024-05-17 | Stop reason: HOSPADM

## 2024-05-17 RX ORDER — SODIUM CHLORIDE 0.9 % (FLUSH) 0.9 %
5-40 SYRINGE (ML) INJECTION EVERY 12 HOURS SCHEDULED
Status: DISCONTINUED | OUTPATIENT
Start: 2024-05-17 | End: 2024-05-18 | Stop reason: HOSPADM

## 2024-05-17 RX ORDER — ONDANSETRON 4 MG/1
4 TABLET, ORALLY DISINTEGRATING ORAL EVERY 8 HOURS PRN
Status: DISCONTINUED | OUTPATIENT
Start: 2024-05-17 | End: 2024-05-18 | Stop reason: HOSPADM

## 2024-05-17 RX ORDER — HYDROCHLOROTHIAZIDE 12.5 MG/1
12.5 TABLET ORAL DAILY
Status: DISCONTINUED | OUTPATIENT
Start: 2024-05-17 | End: 2024-05-18 | Stop reason: HOSPADM

## 2024-05-17 RX ORDER — SODIUM CHLORIDE 0.9 % (FLUSH) 0.9 %
5-40 SYRINGE (ML) INJECTION PRN
Status: DISCONTINUED | OUTPATIENT
Start: 2024-05-17 | End: 2024-05-17 | Stop reason: HOSPADM

## 2024-05-17 RX ORDER — TRANEXAMIC ACID 10 MG/ML
1000 INJECTION, SOLUTION INTRAVENOUS ONCE
Status: DISCONTINUED | OUTPATIENT
Start: 2024-05-17 | End: 2024-05-17

## 2024-05-17 RX ORDER — OXYCODONE HYDROCHLORIDE 5 MG/1
10 TABLET ORAL EVERY 4 HOURS PRN
Status: DISCONTINUED | OUTPATIENT
Start: 2024-05-17 | End: 2024-05-18 | Stop reason: HOSPADM

## 2024-05-17 RX ORDER — SODIUM CHLORIDE 9 MG/ML
INJECTION, SOLUTION INTRAVENOUS PRN
Status: DISCONTINUED | OUTPATIENT
Start: 2024-05-17 | End: 2024-05-18 | Stop reason: HOSPADM

## 2024-05-17 RX ORDER — TRANEXAMIC ACID 100 MG/ML
INJECTION, SOLUTION INTRAVENOUS PRN
Status: DISCONTINUED | OUTPATIENT
Start: 2024-05-17 | End: 2024-05-17 | Stop reason: ALTCHOICE

## 2024-05-17 RX ORDER — METHOCARBAMOL 100 MG/ML
INJECTION, SOLUTION INTRAMUSCULAR; INTRAVENOUS
Status: COMPLETED
Start: 2024-05-17 | End: 2024-05-17

## 2024-05-17 RX ORDER — LIDOCAINE HYDROCHLORIDE 20 MG/ML
INJECTION, SOLUTION EPIDURAL; INFILTRATION; INTRACAUDAL; PERINEURAL PRN
Status: DISCONTINUED | OUTPATIENT
Start: 2024-05-17 | End: 2024-05-17 | Stop reason: SDUPTHER

## 2024-05-17 RX ORDER — VANCOMYCIN HYDROCHLORIDE 1 G/20ML
INJECTION, POWDER, LYOPHILIZED, FOR SOLUTION INTRAVENOUS PRN
Status: DISCONTINUED | OUTPATIENT
Start: 2024-05-17 | End: 2024-05-17 | Stop reason: ALTCHOICE

## 2024-05-17 RX ORDER — KETAMINE HYDROCHLORIDE 10 MG/ML
INJECTION, SOLUTION INTRAMUSCULAR; INTRAVENOUS PRN
Status: DISCONTINUED | OUTPATIENT
Start: 2024-05-17 | End: 2024-05-17 | Stop reason: SDUPTHER

## 2024-05-17 RX ORDER — ROCURONIUM BROMIDE 10 MG/ML
INJECTION, SOLUTION INTRAVENOUS PRN
Status: DISCONTINUED | OUTPATIENT
Start: 2024-05-17 | End: 2024-05-17 | Stop reason: SDUPTHER

## 2024-05-17 RX ORDER — NEOSTIGMINE METHYLSULFATE 1 MG/ML
INJECTION, SOLUTION INTRAVENOUS PRN
Status: DISCONTINUED | OUTPATIENT
Start: 2024-05-17 | End: 2024-05-17 | Stop reason: SDUPTHER

## 2024-05-17 RX ORDER — KETOROLAC TROMETHAMINE 30 MG/ML
15 INJECTION, SOLUTION INTRAMUSCULAR; INTRAVENOUS ONCE
Status: COMPLETED | OUTPATIENT
Start: 2024-05-17 | End: 2024-05-17

## 2024-05-17 RX ORDER — VALSARTAN 320 MG/1
320 TABLET ORAL DAILY
Status: DISCONTINUED | OUTPATIENT
Start: 2024-05-17 | End: 2024-05-18 | Stop reason: HOSPADM

## 2024-05-17 RX ORDER — MIDAZOLAM HYDROCHLORIDE 1 MG/ML
INJECTION INTRAMUSCULAR; INTRAVENOUS PRN
Status: DISCONTINUED | OUTPATIENT
Start: 2024-05-17 | End: 2024-05-17 | Stop reason: SDUPTHER

## 2024-05-17 RX ORDER — SODIUM CHLORIDE 9 MG/ML
INJECTION, SOLUTION INTRAVENOUS
Status: DISPENSED
Start: 2024-05-17 | End: 2024-05-18

## 2024-05-17 RX ORDER — OXYCODONE HYDROCHLORIDE 5 MG/1
5 TABLET ORAL EVERY 4 HOURS PRN
Status: DISCONTINUED | OUTPATIENT
Start: 2024-05-17 | End: 2024-05-18 | Stop reason: HOSPADM

## 2024-05-17 RX ORDER — HYDRALAZINE HYDROCHLORIDE 20 MG/ML
5 INJECTION INTRAMUSCULAR; INTRAVENOUS
Status: DISCONTINUED | OUTPATIENT
Start: 2024-05-17 | End: 2024-05-17 | Stop reason: HOSPADM

## 2024-05-17 RX ORDER — VALSARTAN AND HYDROCHLOROTHIAZIDE 320; 12.5 MG/1; MG/1
1 TABLET, FILM COATED ORAL DAILY
Status: DISCONTINUED | OUTPATIENT
Start: 2024-05-17 | End: 2024-05-17 | Stop reason: CLARIF

## 2024-05-17 RX ORDER — PROPOFOL 10 MG/ML
INJECTION, EMULSION INTRAVENOUS PRN
Status: DISCONTINUED | OUTPATIENT
Start: 2024-05-17 | End: 2024-05-17 | Stop reason: SDUPTHER

## 2024-05-17 RX ORDER — SODIUM CHLORIDE 0.9 % (FLUSH) 0.9 %
5-40 SYRINGE (ML) INJECTION EVERY 12 HOURS SCHEDULED
Status: DISCONTINUED | OUTPATIENT
Start: 2024-05-17 | End: 2024-05-17 | Stop reason: HOSPADM

## 2024-05-17 RX ORDER — FENTANYL CITRATE 50 UG/ML
25 INJECTION, SOLUTION INTRAMUSCULAR; INTRAVENOUS EVERY 5 MIN PRN
Status: DISCONTINUED | OUTPATIENT
Start: 2024-05-17 | End: 2024-05-17 | Stop reason: HOSPADM

## 2024-05-17 RX ADMIN — METHOCARBAMOL 1000 MG: 100 INJECTION INTRAMUSCULAR; INTRAVENOUS at 15:04

## 2024-05-17 RX ADMIN — HYDROMORPHONE HYDROCHLORIDE 0.5 MG: 2 INJECTION, SOLUTION INTRAMUSCULAR; INTRAVENOUS; SUBCUTANEOUS at 14:14

## 2024-05-17 RX ADMIN — WATER 2000 MG: 1 INJECTION INTRAMUSCULAR; INTRAVENOUS; SUBCUTANEOUS at 22:21

## 2024-05-17 RX ADMIN — KETAMINE HYDROCHLORIDE 10 MG: 10 INJECTION INTRAMUSCULAR; INTRAVENOUS at 14:15

## 2024-05-17 RX ADMIN — ROCURONIUM BROMIDE 40 MG: 10 INJECTION, SOLUTION INTRAVENOUS at 12:16

## 2024-05-17 RX ADMIN — ACETAMINOPHEN 650 MG: 325 TABLET ORAL at 23:45

## 2024-05-17 RX ADMIN — SODIUM CHLORIDE: 9 INJECTION, SOLUTION INTRAVENOUS at 17:34

## 2024-05-17 RX ADMIN — KETOROLAC TROMETHAMINE 15 MG: 30 INJECTION, SOLUTION INTRAMUSCULAR at 10:14

## 2024-05-17 RX ADMIN — PHENYLEPHRINE HYDROCHLORIDE 200 MCG: 10 INJECTION INTRAVENOUS at 12:36

## 2024-05-17 RX ADMIN — DEXAMETHASONE SODIUM PHOSPHATE 8 MG: 4 INJECTION, SOLUTION INTRAMUSCULAR; INTRAVENOUS at 12:51

## 2024-05-17 RX ADMIN — ACETAMINOPHEN 650 MG: 325 TABLET ORAL at 17:31

## 2024-05-17 RX ADMIN — HYDRALAZINE HYDROCHLORIDE 25 MG: 25 TABLET ORAL at 22:11

## 2024-05-17 RX ADMIN — TRAMADOL HYDROCHLORIDE 50 MG: 50 TABLET, COATED ORAL at 17:31

## 2024-05-17 RX ADMIN — ONDANSETRON 4 MG: 2 INJECTION INTRAMUSCULAR; INTRAVENOUS at 13:53

## 2024-05-17 RX ADMIN — METOCLOPRAMIDE 10 MG: 5 INJECTION, SOLUTION INTRAMUSCULAR; INTRAVENOUS at 10:14

## 2024-05-17 RX ADMIN — DIPHENHYDRAMINE HYDROCHLORIDE 12.5 MG: 50 INJECTION, SOLUTION INTRAMUSCULAR; INTRAVENOUS at 12:09

## 2024-05-17 RX ADMIN — HYDROCHLOROTHIAZIDE 12.5 MG: 12.5 TABLET ORAL at 17:31

## 2024-05-17 RX ADMIN — HYDROMORPHONE HYDROCHLORIDE 0.5 MG: 1 INJECTION, SOLUTION INTRAMUSCULAR; INTRAVENOUS; SUBCUTANEOUS at 15:10

## 2024-05-17 RX ADMIN — PHENYLEPHRINE HYDROCHLORIDE 200 MCG: 10 INJECTION INTRAVENOUS at 13:04

## 2024-05-17 RX ADMIN — PHENYLEPHRINE HYDROCHLORIDE 50 MCG: 10 INJECTION INTRAVENOUS at 13:59

## 2024-05-17 RX ADMIN — HYDROMORPHONE HYDROCHLORIDE 0.5 MG: 1 INJECTION, SOLUTION INTRAMUSCULAR; INTRAVENOUS; SUBCUTANEOUS at 15:25

## 2024-05-17 RX ADMIN — DOCUSATE SODIUM 50 MG AND SENNOSIDES 8.6 MG 1 TABLET: 8.6; 5 TABLET, FILM COATED ORAL at 22:11

## 2024-05-17 RX ADMIN — MIDAZOLAM 2 MG: 1 INJECTION INTRAMUSCULAR; INTRAVENOUS at 12:07

## 2024-05-17 RX ADMIN — VALSARTAN 320 MG: 320 TABLET ORAL at 17:32

## 2024-05-17 RX ADMIN — HYDROMORPHONE HYDROCHLORIDE 0.5 MG: 2 INJECTION, SOLUTION INTRAMUSCULAR; INTRAVENOUS; SUBCUTANEOUS at 14:19

## 2024-05-17 RX ADMIN — PHENYLEPHRINE HYDROCHLORIDE 200 MCG: 10 INJECTION INTRAVENOUS at 12:23

## 2024-05-17 RX ADMIN — GLYCOPYRROLATE 0.3 MG: 0.2 INJECTION, SOLUTION INTRAMUSCULAR; INTRAVENOUS at 13:25

## 2024-05-17 RX ADMIN — FENTANYL CITRATE 100 MCG: 50 INJECTION, SOLUTION INTRAMUSCULAR; INTRAVENOUS at 12:16

## 2024-05-17 RX ADMIN — ACETAMINOPHEN 1000 MG: 500 TABLET ORAL at 10:12

## 2024-05-17 RX ADMIN — SODIUM CHLORIDE, PRESERVATIVE FREE 10 ML: 5 INJECTION INTRAVENOUS at 22:13

## 2024-05-17 RX ADMIN — WATER 2000 MG: 1 INJECTION INTRAMUSCULAR; INTRAVENOUS; SUBCUTANEOUS at 12:21

## 2024-05-17 RX ADMIN — GLYCOPYRROLATE 0.5 MG: 0.2 INJECTION, SOLUTION INTRAMUSCULAR; INTRAVENOUS at 14:08

## 2024-05-17 RX ADMIN — SODIUM CHLORIDE: 9 INJECTION, SOLUTION INTRAVENOUS at 13:41

## 2024-05-17 RX ADMIN — KETOROLAC TROMETHAMINE 15 MG: 15 INJECTION, SOLUTION INTRAMUSCULAR; INTRAVENOUS at 22:13

## 2024-05-17 RX ADMIN — SODIUM CHLORIDE: 9 INJECTION, SOLUTION INTRAVENOUS at 12:07

## 2024-05-17 RX ADMIN — TRAMADOL HYDROCHLORIDE 50 MG: 50 TABLET, COATED ORAL at 23:45

## 2024-05-17 RX ADMIN — HYDROMORPHONE HYDROCHLORIDE 0.5 MG: 2 INJECTION, SOLUTION INTRAMUSCULAR; INTRAVENOUS; SUBCUTANEOUS at 14:21

## 2024-05-17 RX ADMIN — HYDROMORPHONE HYDROCHLORIDE 0.5 MG: 2 INJECTION, SOLUTION INTRAMUSCULAR; INTRAVENOUS; SUBCUTANEOUS at 14:32

## 2024-05-17 RX ADMIN — KETAMINE HYDROCHLORIDE 20 MG: 10 INJECTION INTRAMUSCULAR; INTRAVENOUS at 12:16

## 2024-05-17 RX ADMIN — LIDOCAINE HYDROCHLORIDE 100 MG: 20 INJECTION, SOLUTION EPIDURAL; INFILTRATION; INTRACAUDAL; PERINEURAL at 12:16

## 2024-05-17 RX ADMIN — KETAMINE HYDROCHLORIDE 10 MG: 10 INJECTION INTRAMUSCULAR; INTRAVENOUS at 12:46

## 2024-05-17 RX ADMIN — FENTANYL CITRATE 50 MCG: 50 INJECTION, SOLUTION INTRAMUSCULAR; INTRAVENOUS at 13:16

## 2024-05-17 RX ADMIN — SODIUM CHLORIDE: 9 INJECTION, SOLUTION INTRAVENOUS at 16:37

## 2024-05-17 RX ADMIN — Medication 3 MG: at 14:08

## 2024-05-17 RX ADMIN — FENTANYL CITRATE 50 MCG: 50 INJECTION, SOLUTION INTRAMUSCULAR; INTRAVENOUS at 12:30

## 2024-05-17 RX ADMIN — SODIUM CHLORIDE, POTASSIUM CHLORIDE, SODIUM LACTATE AND CALCIUM CHLORIDE: 600; 310; 30; 20 INJECTION, SOLUTION INTRAVENOUS at 12:40

## 2024-05-17 RX ADMIN — FENTANYL CITRATE 50 MCG: 50 INJECTION, SOLUTION INTRAMUSCULAR; INTRAVENOUS at 13:41

## 2024-05-17 RX ADMIN — KETAMINE HYDROCHLORIDE 10 MG: 10 INJECTION INTRAMUSCULAR; INTRAVENOUS at 12:50

## 2024-05-17 RX ADMIN — FAMOTIDINE 20 MG: 10 INJECTION, SOLUTION INTRAVENOUS at 10:14

## 2024-05-17 RX ADMIN — PROPOFOL 150 MG: 10 INJECTION, EMULSION INTRAVENOUS at 12:16

## 2024-05-17 RX ADMIN — PHENYLEPHRINE HYDROCHLORIDE 50 MCG: 10 INJECTION INTRAVENOUS at 13:54

## 2024-05-17 ASSESSMENT — PAIN SCALES - GENERAL
PAINLEVEL_OUTOF10: 6
PAINLEVEL_OUTOF10: 4
PAINLEVEL_OUTOF10: 8
PAINLEVEL_OUTOF10: 5
PAINLEVEL_OUTOF10: 6
PAINLEVEL_OUTOF10: 0

## 2024-05-17 ASSESSMENT — PAIN DESCRIPTION - ONSET
ONSET: GRADUAL
ONSET: GRADUAL

## 2024-05-17 ASSESSMENT — PAIN DESCRIPTION - DESCRIPTORS
DESCRIPTORS: DISCOMFORT;ACHING;SORE;TENDER
DESCRIPTORS: DISCOMFORT
DESCRIPTORS: DISCOMFORT
DESCRIPTORS: ACHING;DISCOMFORT
DESCRIPTORS: DISCOMFORT
DESCRIPTORS: DISCOMFORT;SORE;TENDER

## 2024-05-17 ASSESSMENT — PAIN - FUNCTIONAL ASSESSMENT
PAIN_FUNCTIONAL_ASSESSMENT: PREVENTS OR INTERFERES SOME ACTIVE ACTIVITIES AND ADLS
PAIN_FUNCTIONAL_ASSESSMENT: PREVENTS OR INTERFERES SOME ACTIVE ACTIVITIES AND ADLS
PAIN_FUNCTIONAL_ASSESSMENT: ACTIVITIES ARE NOT PREVENTED
PAIN_FUNCTIONAL_ASSESSMENT: ACTIVITIES ARE NOT PREVENTED
PAIN_FUNCTIONAL_ASSESSMENT: 0-10
PAIN_FUNCTIONAL_ASSESSMENT: PREVENTS OR INTERFERES SOME ACTIVE ACTIVITIES AND ADLS
PAIN_FUNCTIONAL_ASSESSMENT: 0-10

## 2024-05-17 ASSESSMENT — PAIN DESCRIPTION - FREQUENCY
FREQUENCY: CONTINUOUS
FREQUENCY: CONTINUOUS

## 2024-05-17 ASSESSMENT — PAIN DESCRIPTION - LOCATION
LOCATION: HIP

## 2024-05-17 ASSESSMENT — PAIN DESCRIPTION - ORIENTATION
ORIENTATION: RIGHT

## 2024-05-17 ASSESSMENT — LIFESTYLE VARIABLES: SMOKING_STATUS: 0

## 2024-05-17 ASSESSMENT — PAIN DESCRIPTION - PAIN TYPE
TYPE: ACUTE PAIN;SURGICAL PAIN
TYPE: ACUTE PAIN;SURGICAL PAIN

## 2024-05-17 NOTE — DISCHARGE SUMMARY
Lam Summers  [id number]  73 y.o.  1950    Admit date: 5/17/2024    Discharge date and time: 5/18/2024    Admitting Physician: VIDYA Mckee MD    Discharge Physician: VIDYA Mckee MD    Admission Diagnoses: Right intertrochanteric hip fracture, malunion    Discharge Diagnoses: Same    Admission Condition: Good    Discharged Condition: Good    Procedure and Date: Explant right trochanteric nail, conversion to a right total hip arthroplasty    Hospital Course: A total hip arthroplasty was performed on 5/17/2024.  Following this procedure the patient was admitted for a 1 day postoperative hospital stay.  During this admission, DVT prophylaxis was followed using bilateral ICDs and ASA. Post-operative pain control was achieved with the use of IV/oral pain medications. Discharge plans were discussed with the patient with the aid of .     Disposition: Home    Patient Instructions:     Medications for pain:    Acetaminophen - Take one tablet every 6 hours for 1 week.  Then take every 6 hours as needed for pain.    Tramadol - Take one tablet every 6 hours for 1 week.  Then take every 6 hours as needed for pain.    Oxycodone 1 tablet every 4 hours as needed for pain.  You can take 2 every 6 hours for severe pain.    Medication for DVT prophylaxis (Prevention of blood clots)  Aspirin - Take 1 tablet daily for 6 weeks for prevention of blood clots    Medication for constipation:  Colace - Take 1 tablet every 12 hours as needed for constipation        Activity: Weight bear as tolerated with a walker. Posterior hip precautions.  Diet: regular diet  Wound Care: Patient should remove dressing in 9 days.  Patient can shower with the dressing on but should not bathe.  After removing, the dressing keep incision clean and dry    Follow-up with Dr. Mckee in 2 weeks.

## 2024-05-17 NOTE — PROGRESS NOTES
went to see patient while rounding. Patient was sleeping.   prayed a silent prayer for the patient and left devotional material and information about  services.  Chaplains will remain available to offer spiritual and emotional support as needed.

## 2024-05-17 NOTE — OP NOTE
Operative Report  Lam Summers  [id number]  5/17/2024    Pre-operative diagnosis:      1. Right intertrochanteric hip fracture malunion                                                   2. Failed hardware right hip                                                   3. Status post IMN right hip fracture     Post-operative diagnosis:    1. Right intertrochanteric hip fracture malunion                                                   2. Failed hardware right hip                                                   3. Status post IMN right hip fracture     Procedure:    1. Explant intramedullary nail right hip  2. Alexandre right total hip arthroplasty     Surgeon: Tejinder Mckee MD     Assistant:  Corinne Schenker, NP; assisted with positioning, retracting and closing      Anesthesia:  General     Operative Indication: Lam is a 72 y/o male who is 6 months status post a an IMN with Dr. Tapia for a right intertrochanteric hip fracture.  He has had increasing difficulty walking and pain. Xrays showed a collapse of the femoral head and neck and a malunion of the intertrochanteric hip fracture. Explant of the intramedullary nail and conversion to a total hip arthroplasty was discussed.   The risks and benefits of surgery were discussed and all questions were answered.  The risks for surgery include bleeding, infection, damage to blood vessels, damage to nerves, risk of further surgery, chronic pain,  restricted range of motion, risk of continued discomfort, risk of need for further reconstructive procedures, leg length discrepancy, risk of need for altered activities and altered gait, risk of blood clots, pulmonary embolism, myocardial infarction, and risk of death were discussed. The patient understood these risks and consented for surgery.     EBL: 150 cc     Complications: None     Components: 1. Steven restoration modular stem 115/19 mm, 21 mm +0 proximal cone body                          2. Steven Trident cup, size  abduction pillow between the legs. The patient was taken to recovery in stable condition.     Tejinder Mckee MD

## 2024-05-17 NOTE — ANESTHESIA POSTPROCEDURE EVALUATION
Department of Anesthesiology  Postprocedure Note    Patient: Lam Summers  MRN: [id number]  YOB: 1950  Date of evaluation: 5/17/2024    Procedure Summary       Date: 05/17/24 Room / Location: [address]    Anesthesia Start: 1207 Anesthesia Stop: 1432    Procedure: EXPLANT RIGHT HIP INTRAMEDULLARY NAIL CONVERSION JIMMY RIGHT TOTAL HIP ARTHROPLASTY (Right: Hip) Diagnosis:       Closed displaced intertrochanteric fracture of right femur with malunion      Pain due to right hip joint prosthesis, initial encounter ([facility])      (Closed displaced intertrochanteric fracture of right femur with malunion [S72.141P])      (Pain due to right hip joint prosthesis, initial encounter ([facility]) [T84.84XA, Z96.641])    Surgeons: Tejinder Mckee MD Responsible Provider: Scott Whittaker DO    Anesthesia Type: general ASA Status: 3            Anesthesia Type: No value filed.    Kayleigh Phase I: Kayleigh Score: 9    Kayleigh Phase II:      Anesthesia Post Evaluation    Patient location during evaluation: bedside  Patient participation: complete - patient participated  Level of consciousness: awake  Pain score: 3  Airway patency: patent  Nausea & Vomiting: no vomiting and no nausea  Cardiovascular status: hemodynamically stable  Respiratory status: acceptable  Hydration status: stable  Comments: Seen and examined.  Progressing as expected.  No questions or concerns at this time.  Multimodal analgesia pain management approach  Pain management: adequate    No notable events documented.

## 2024-05-17 NOTE — DISCHARGE INSTRUCTIONS
Orthopaedic Patient Instructions:     Medications for pain:    Acetaminophen - Take one tablet every 6 hours for 1 week.  Then take every 6 hours as needed for pain.    Tramadol - Take one tablet every 6 hours for 1 week.  Then take every 6 hours as needed for pain.    Oxycodone 1 tablet every 4 hours as needed for pain.  You can take 2 every 6 hours for severe pain.    Medication for DVT prophylaxis (Prevention of blood clots)  Aspirin - Take 1 tablet daily for 6 weeks for prevention of blood clots    Medication for constipation:  Colace - Take 1 tablet every 12 hours as needed for constipation        Activity: activity as tolerated  Diet: regular diet  Wound Care: Patient should remove dressing in 9 days.  Patient can shower with the dressing on but should not bathe.  After removing, the dressing keep incision clean and dry    Follow-up with Dr. Mckee in 2 weeks.  Call for an appointment

## 2024-05-17 NOTE — ACP (ADVANCE CARE PLANNING)
Advance Care Planning   Healthcare Decision Maker:    Primary Decision Maker: Jefry Summers - [phone]    Click here to complete Healthcare Decision Makers including selection of the Healthcare Decision Maker Relationship (ie \"Primary\").

## 2024-05-17 NOTE — ANESTHESIA PRE PROCEDURE
tablets      megestrol (MEGACE) 40 MG/ML suspension Take 5 mLs by mouth daily      b complex vitamins capsule Take 1 capsule by mouth daily      Multiple Vitamin (MULTI-VITAMIN DAILY PO) Take by mouth      Handicap Placard MISC by Does not apply route Above patient unable to ambulate more than 200 feet without stopping to rest.  Expires: 1- 1 each 0    valsartan-hydroCHLOROthiazide (DIOVAN-HCT) 320-12.5 MG per tablet Take 1 tablet by mouth daily         Allergies:    Allergies   Allergen Reactions    Doxycycline Rash       Problem List:    Patient Active Problem List   Diagnosis Code    Chronic anemia D64.9    Hemorrhagic complications of dental implant placement M27.61    BPH (benign prostatic hyperplasia) N40.0    Aspiration pneumonia ([facility]) J69.0    T12 compression fracture, initial encounter ([facility]) S22.080A    Lumbar facet arthropathy M47.816    Lumbar disc disorder M51.9    Lumbar spondylosis M47.816    Closed compression fracture of body of L1 vertebra ([facility]) S32.010A    Chronic pain syndrome G89.4    Spinal stenosis of lumbar region without neurogenic claudication M48.061    Hip fracture requiring operative repair, left, closed, initial encounter ([facility]) S72.002A    NSTEMI (non-ST elevated myocardial infarction) ([facility]) I21.4    Primary hypertension I10    Closed right hip fracture, initial encounter ([facility]) S72.001A       Past Medical History:        Diagnosis Date    Blood disorder     blood didn't want to clot, going to the [facility] (acquired hemophiliac)    Fracture, hip - Right 11/2023    not healed correctly    History of blood transfusion     due to clotting disorder -    Hypertension     Lyme disease 2018       Past Surgical History:        Procedure Laterality Date    COLONOSCOPY      CYST REMOVAL      HIP SURGERY Right 11/30/2023    RIGHT HIP INTRAMEDULLARY  NAIL INSERTION performed by Ernesto Tapia DO at [facility] OR    OTHER SURGICAL HISTORY      dental implant       Social History:    Social History

## 2024-05-17 NOTE — PROGRESS NOTES
4 Eyes Skin Assessment     NAME:  Lam Summers  YOB: 1950  MEDICAL RECORD NUMBER:  [id number]    The patient is being assessed for  Admission    I agree that at least one RN has performed a thorough Head to Toe Skin Assessment on the patient. ALL assessment sites listed below have been assessed.      Areas assessed by both nurses:    Head, Face, Ears, Shoulders, Back, Chest, Arms, Elbows, Hands, Sacrum. Buttock, Coccyx, Ischium, and Legs. Feet and Heels        Does the Patient have a Wound? No noted wound(s)       Damion Prevention initiated by RN: No  Wound Care Orders initiated by RN: No    Pressure Injury (Stage 3,4, Unstageable, DTI, NWPT, and Complex wounds) if present, place Wound referral order by RN under : No    New Ostomies, if present place, Ostomy referral order under : No     Nurse 1 eSignature: Electronically signed by Corby Zaragoza RN on 5/17/24 at 6:33 PM EDT    **SHARE this note so that the co-signing nurse can place an eSignature**    Nurse 2 eSignature: Electronically signed by Mariaa Ontiveros RN on 5/17/24 at 6:34 PM EDT

## 2024-05-17 NOTE — CARE COORDINATION
Pt seen in ortho class. Still in OR for right total hip arthoplasty. Pt lives with spouse in [facility] home, 2-3 steps in with rails. Bed and bath on 1st floor. Has high commode, wheeled walker and walk in shower. Plan is home with [facility] health-notified and orders completed. Follows with Usha Delgado CNP. Hx of [facility] in Nov 2023 after hip fracture. Pt wants home-o

## 2024-05-18 VITALS
HEART RATE: 81 BPM | RESPIRATION RATE: 18 BRPM | TEMPERATURE: 98.4 F | BODY MASS INDEX: 24.96 KG/M2 | WEIGHT: 159 LBS | DIASTOLIC BLOOD PRESSURE: 51 MMHG | OXYGEN SATURATION: 98 % | HEIGHT: 67 IN | SYSTOLIC BLOOD PRESSURE: 144 MMHG

## 2024-05-18 LAB
ANION GAP SERPL CALCULATED.3IONS-SCNC: 9 MMOL/L (ref 7–16)
BUN SERPL-MCNC: 30 MG/DL (ref 6–23)
CALCIUM SERPL-MCNC: 8.6 MG/DL (ref 8.6–10.2)
CHLORIDE SERPL-SCNC: 111 MMOL/L (ref 98–107)
CO2 SERPL-SCNC: 18 MMOL/L (ref 22–29)
CREAT SERPL-MCNC: 1.1 MG/DL (ref 0.7–1.2)
ERYTHROCYTE [DISTWIDTH] IN BLOOD BY AUTOMATED COUNT: 12.1 % (ref 11.5–15)
GFR, ESTIMATED: 69 ML/MIN/1.73M2
GLUCOSE SERPL-MCNC: 113 MG/DL (ref 74–99)
HCT VFR BLD AUTO: 25.1 % (ref 37–54)
HGB BLD-MCNC: 8 G/DL (ref 12.5–16.5)
MCH RBC QN AUTO: 31.7 PG (ref 26–35)
MCHC RBC AUTO-ENTMCNC: 31.9 G/DL (ref 32–34.5)
MCV RBC AUTO: 99.6 FL (ref 80–99.9)
PLATELET # BLD AUTO: 200 K/UL (ref 130–450)
PMV BLD AUTO: 11.4 FL (ref 7–12)
POTASSIUM SERPL-SCNC: 4.3 MMOL/L (ref 3.5–5)
RBC # BLD AUTO: 2.52 M/UL (ref 3.8–5.8)
SODIUM SERPL-SCNC: 138 MMOL/L (ref 132–146)
WBC OTHER # BLD: 7 K/UL (ref 4.5–11.5)

## 2024-05-18 PROCEDURE — 6370000000 HC RX 637 (ALT 250 FOR IP): Performed by: ORTHOPAEDIC SURGERY

## 2024-05-18 PROCEDURE — 97116 GAIT TRAINING THERAPY: CPT

## 2024-05-18 PROCEDURE — 97530 THERAPEUTIC ACTIVITIES: CPT

## 2024-05-18 PROCEDURE — 51798 US URINE CAPACITY MEASURE: CPT

## 2024-05-18 PROCEDURE — 85027 COMPLETE CBC AUTOMATED: CPT

## 2024-05-18 PROCEDURE — 97165 OT EVAL LOW COMPLEX 30 MIN: CPT

## 2024-05-18 PROCEDURE — 6360000002 HC RX W HCPCS: Performed by: ORTHOPAEDIC SURGERY

## 2024-05-18 PROCEDURE — 2580000003 HC RX 258: Performed by: ORTHOPAEDIC SURGERY

## 2024-05-18 PROCEDURE — 97535 SELF CARE MNGMENT TRAINING: CPT

## 2024-05-18 PROCEDURE — 97161 PT EVAL LOW COMPLEX 20 MIN: CPT

## 2024-05-18 PROCEDURE — 80048 BASIC METABOLIC PNL TOTAL CA: CPT

## 2024-05-18 RX ORDER — OXYCODONE HYDROCHLORIDE 5 MG/1
5 TABLET ORAL EVERY 4 HOURS PRN
Qty: 42 TABLET | Refills: 0 | Status: SHIPPED | OUTPATIENT
Start: 2024-05-18 | End: 2024-05-25

## 2024-05-18 RX ORDER — ACETAMINOPHEN 325 MG/1
650 TABLET ORAL EVERY 6 HOURS
Qty: 120 TABLET | Refills: 3 | Status: SHIPPED | OUTPATIENT
Start: 2024-05-18

## 2024-05-18 RX ORDER — TRAMADOL HYDROCHLORIDE 50 MG/1
50 TABLET ORAL EVERY 6 HOURS
Qty: 28 TABLET | Refills: 0 | Status: SHIPPED | OUTPATIENT
Start: 2024-05-18 | End: 2024-05-25

## 2024-05-18 RX ORDER — ASPIRIN 325 MG
325 TABLET, DELAYED RELEASE (ENTERIC COATED) ORAL DAILY
Qty: 42 TABLET | Refills: 0 | Status: SHIPPED | OUTPATIENT
Start: 2024-05-19

## 2024-05-18 RX ORDER — SENNA AND DOCUSATE SODIUM 50; 8.6 MG/1; MG/1
1 TABLET, FILM COATED ORAL 2 TIMES DAILY
Qty: 30 TABLET | Refills: 0 | Status: SHIPPED | OUTPATIENT
Start: 2024-05-18

## 2024-05-18 RX ADMIN — HYDRALAZINE HYDROCHLORIDE 25 MG: 25 TABLET ORAL at 09:35

## 2024-05-18 RX ADMIN — VALSARTAN 320 MG: 320 TABLET ORAL at 09:35

## 2024-05-18 RX ADMIN — DEXAMETHASONE SODIUM PHOSPHATE 10 MG: 10 INJECTION INTRAMUSCULAR; INTRAVENOUS at 16:18

## 2024-05-18 RX ADMIN — MEGESTROL ACETATE 200 MG: 400 SUSPENSION ORAL at 09:35

## 2024-05-18 RX ADMIN — ACETAMINOPHEN 650 MG: 325 TABLET ORAL at 10:40

## 2024-05-18 RX ADMIN — TRAMADOL HYDROCHLORIDE 50 MG: 50 TABLET, COATED ORAL at 05:33

## 2024-05-18 RX ADMIN — SODIUM CHLORIDE, PRESERVATIVE FREE 10 ML: 5 INJECTION INTRAVENOUS at 09:36

## 2024-05-18 RX ADMIN — TRAMADOL HYDROCHLORIDE 50 MG: 50 TABLET, COATED ORAL at 10:40

## 2024-05-18 RX ADMIN — WATER 2000 MG: 1 INJECTION INTRAMUSCULAR; INTRAVENOUS; SUBCUTANEOUS at 05:33

## 2024-05-18 RX ADMIN — DOCUSATE SODIUM 50 MG AND SENNOSIDES 8.6 MG 1 TABLET: 8.6; 5 TABLET, FILM COATED ORAL at 09:35

## 2024-05-18 RX ADMIN — HYDROCHLOROTHIAZIDE 12.5 MG: 12.5 TABLET ORAL at 09:35

## 2024-05-18 RX ADMIN — ASPIRIN 325 MG: 325 TABLET, COATED ORAL at 09:35

## 2024-05-18 RX ADMIN — ACETAMINOPHEN 650 MG: 325 TABLET ORAL at 05:33

## 2024-05-18 ASSESSMENT — PAIN DESCRIPTION - ORIENTATION: ORIENTATION: RIGHT

## 2024-05-18 ASSESSMENT — PAIN SCALES - GENERAL
PAINLEVEL_OUTOF10: 5
PAINLEVEL_OUTOF10: 5
PAINLEVEL_OUTOF10: 7

## 2024-05-18 ASSESSMENT — PAIN DESCRIPTION - LOCATION
LOCATION: HIP
LOCATION: HIP

## 2024-05-18 ASSESSMENT — PAIN DESCRIPTION - DESCRIPTORS: DESCRIPTORS: ACHING;SORE;DISCOMFORT;TENDER

## 2024-05-18 ASSESSMENT — PAIN - FUNCTIONAL ASSESSMENT: PAIN_FUNCTIONAL_ASSESSMENT: ACTIVITIES ARE NOT PREVENTED

## 2024-05-18 NOTE — PROGRESS NOTES
Pt dangled at bedside and then stood. Pt took a few steps, attempted to void and returned to bed. Bladder scan done and pt is retaining 523mL. Straight cath performed and 500 mL obtained. -297

## 2024-05-18 NOTE — PROGRESS NOTES
Initial Eval        Attending Provider:  Tejinder Mckee MD    Evaluating PT:  Mihir Fuentesedgar PT    Room #:  0730/0730-A  Diagnosis:  1. Right intertrochanteric hip fracture malunion                        2. Failed hardware right hip                        3. Status post IMN right hip fracture  Pertinent PMHx/PSHx:  See PMH  Procedure/Surgery:  1. Explant intramedullary nail right hip              2. Alexandre right total hip arthroplasty  Precautions:  WBAT, [place] hip ELISEO, Falls,   Equipment Needs:  WW, BSC, OT equip, Abd pillow    SUBJECTIVE:    Pt lives with wife in a 1.5 story home with 1 stairs and 1 rail to enter.   Pt ambulated with WW indep PTA.    OBJECTIVE:   Initial Evaluation  Date: 5/18/24 Treatment Short Term/ Long Term   Goals   Was pt agreeable to Eval/treatment? Yes     Does pt have pain? RT hip     Bed Mobility  Rolling: NA  Supine to sit: CGA/SBA  Sit to supine: NA  Scooting: S/SBA  S/Indep levels    Transfers Sit to stand: SBA/CGA  Stand to sit: CGA  Stand pivot: SBA/CGA   S/Indep   Ambulation   100 feet with WW CGA/SBA  300-500 feet with WW S/SBA   Stair negotiation: ascended and descended  TBA  4 steps with 1 rail S/SBA   ROM RT hip dec with knee/ankle WFL  RT hip as eloy within ROM restrictions   MMT RT hip 3-/5, knee 4-5/5  RT Hip .5 to 1 Gr   AM-PAC 6 Clicks 16/24       Pt is A & O x 4   Sensation:  Pt denies numbness and tingling to RTLE  Edema:  Post operative RTLE  Balance: sitting:Indep and standing: WW use SBA/CGA  Endurance: Functional     Patient education  Pt educated on [place] ELISEO ROM restrictions, seated LE/core position, No reaching beyond knee levels to keep < 90*, Abd pillow use,     Patient response to education:   Pt verbalized understanding Pt demonstrated skill Pt requires further education in this area   Y Y Review PRN     ASSESSMENT:    Comments:  In bed on arrival in no distress.  Transfer to EOB was CGA/SBA w/o any bed rail use to guide or assist.  Sat EOB w/o hand-bed

## 2024-05-18 NOTE — PROGRESS NOTES
OCCUPATIONAL THERAPY INITIAL EVALUATION    [facility]   [address] [facility]        Date:2024                                                  Patient Name: Lam Summers    MRN: [id number]    : 1950    Room: [address] [place] [address]    Evaluating OT: Tonie Leahy OTR/L #[id number]    Referring Provider:  Tejinder Mckee MD     Specific Provider Orders/Date:  OT Eval and Treat , 2024     Diagnosis:   1. Intertrochanteric fracture of right hip, sequela    2. Closed displaced intertrochanteric fracture of right femur with malunion    3. Pain due to right hip joint prosthesis, initial encounter ([facility])         Surgery: S/p explant R hip intramedullary nail conversion JIMMY R total hip arthroplasty 24       Pertinent Medical History: R hip fracture s/p R hip intramedullary nail insertion 2023      Precautions:  Fall Risk, full weight bearing as tolerated R LE, posterolateral hip precautions      Assessment of current deficits    [x] Functional mobility  [x]ADLs  [x] Strength               []Cognition    [x] Functional transfers   [x] IADLs         [x] Safety Awareness   [x]Endurance    [] Fine Coordination              [x] Balance      [] Vision/perception   []Sensation     []Gross Motor Coordination  [] ROM  [] Delirium                   [] Motor Control     OT PLAN OF CARE   OT POC based on physician orders, patient diagnosis and results of clinical assessment    Frequency/Duration 2-5 days/wk for 2-4 weeks BID PRN     Specific OT Treatment Interventions to include:   * Instruction/training on adapted ADL techniques and AE recommendations to increase functional independence within precautions       * Training on energy conservation strategies, correct breathing pattern and techniques to improve independence/tolerance for self-care routine  * Functional transfer/mobility training/DME recommendations for increased independence, safety, and fall  of independence and safely.  Pt demonstrated good understanding of education & follow through.  At end of session, patient was in chair with call light and phone within reach, all lines and tubes intact.  Overall, patient demonstrated  decreased independence and safety during completion of ADL tasks.  Pt would benefit from continued skilled OT to increase safety and independence with completion of ADL tasks and functional mobility for improved quality of life.       Treatment: OT treatment provided this date includes:   Instruction, education and training on safe facilitation and adapted techniques for completion of ADLs. These include safe functional transfer techniques (including simulated car transfers) and energy conservation for completion of ADLs. Education provided on hand/feet placement with walker and body mechanics for fall prevention. Cues for energy conservation and safety for in the home at [place], including modifications and DME. Extended time to complete all tasks, including skilled monitoring of patient's response during treatment session and vital signs. Prior to and at the end of session, environmental modifications / line management completed for patients safety and efficiency of treatment session. See above for further details.    Rehab Potential: Good for established goals.      Patient / Family Goal: Return home       Patient and/or family were instructed on functional diagnosis, prognosis/goals and OT plan of care. Demonstrated good understanding.     Eval Complexity: Low    Time In: 10:45 AM   Time Out: 1130 AM    Total Treatment Time: 25       Min Units   OT Eval Low 97165  X  1    OT Eval Medium 78402      OT Eval High 01088      OT Re-Eval 62276            ADL/Self Care 84496 15 1   Therapeutic Activities 38231 10 1   Therapeutic Ex 03336       Orthotic Management 87693       Manual 34914     Neuro Re-Ed 23811       Non-Billable Time        Evaluation Time additionally includes thorough review

## 2024-05-18 NOTE — PLAN OF CARE
Problem: Discharge Planning  Goal: Discharge to home or other facility with appropriate resources  5/18/2024 1211 by Jamilah Sharp RN  Outcome: Progressing  5/18/2024 0352 by Loren Richter RN  Outcome: Progressing     Problem: Pain  Goal: Verbalizes/displays adequate comfort level or baseline comfort level  5/18/2024 1211 by Jamilah Sharp RN  Outcome: Progressing  5/18/2024 0352 by Loren Richter RN  Outcome: Progressing     Problem: Safety - Adult  Goal: Free from fall injury  5/18/2024 1211 by Jamilah Sharp RN  Outcome: Progressing  5/18/2024 0352 by Loren Richter RN  Outcome: Progressing

## 2024-05-18 NOTE — PROGRESS NOTES
Orthopaedic Surgery Progress Note  VIDYA Mckee MD      SUBJECTIVE:  No acute events over night. Pain controlled. Denies chest pain or shortness of breath.    OBJECTIVE:   AAOx3, NAD    Right lower extremity    Dressings C/D/I  SILT L1-S1  TA/EHL/GS intact  Calf soft, NT  +2 DP, W/WP     Data:  CBC:   Lab Results   Component Value Date/Time    WBC 7.0 05/18/2024 05:40 AM    RBC 2.52 05/18/2024 05:40 AM    HGB 8.0 05/18/2024 05:40 AM    HCT 25.1 05/18/2024 05:40 AM    MCV 99.6 05/18/2024 05:40 AM    MCH 31.7 05/18/2024 05:40 AM    MCHC 31.9 05/18/2024 05:40 AM    RDW 12.1 05/18/2024 05:40 AM     05/18/2024 05:40 AM    MPV 11.4 05/18/2024 05:40 AM     BMP:    Lab Results   Component Value Date/Time     05/18/2024 05:40 AM    K 4.3 05/18/2024 05:40 AM    K 4.2 10/27/2021 02:06 AM     05/18/2024 05:40 AM    CO2 18 05/18/2024 05:40 AM    BUN 30 05/18/2024 05:40 AM    CREATININE 1.1 05/18/2024 05:40 AM    CALCIUM 8.6 05/18/2024 05:40 AM    GFRAA >60 07/07/2022 04:30 PM    LABGLOM 69 05/18/2024 05:40 AM    LABGLOM >60 12/01/2023 08:30 AM    GLUCOSE 113 05/18/2024 05:40 AM         A/P: POD 1 explant right troch nail, conversion to right total hip arthroplasty  - WBAT  - Pain control  - PT/OT  - DVT prophylaxis  - D/C planning for home today    VIDYA Mckee MD

## 2024-05-20 ENCOUNTER — HOSPITAL ENCOUNTER (EMERGENCY)
Age: 74
Discharge: HOME | End: 2024-05-20
Payer: MEDICARE

## 2024-05-20 VITALS
HEART RATE: 97 BPM | RESPIRATION RATE: 20 BRPM | DIASTOLIC BLOOD PRESSURE: 75 MMHG | HEIGHT: 67 IN | WEIGHT: 160 LBS | BODY MASS INDEX: 25.11 KG/M2 | SYSTOLIC BLOOD PRESSURE: 149 MMHG | TEMPERATURE: 98.7 F | OXYGEN SATURATION: 100 %

## 2024-05-20 DIAGNOSIS — R33.9 URINARY RETENTION: Primary | ICD-10-CM

## 2024-05-20 LAB
BILIRUB UR QL STRIP: NEGATIVE
CLARITY UR: CLEAR
COLOR UR: YELLOW
GLUCOSE UR STRIP-MCNC: NEGATIVE MG/DL
HGB UR QL STRIP.AUTO: NEGATIVE
KETONES UR STRIP-MCNC: NEGATIVE MG/DL
LEUKOCYTE ESTERASE UR QL STRIP: NEGATIVE
NITRITE UR QL STRIP: NEGATIVE
PH UR STRIP: 6 [PH] (ref 5–9)
PROT UR STRIP-MCNC: NEGATIVE MG/DL
RBC #/AREA URNS HPF: NORMAL /HPF
SP GR UR STRIP: 1.01 (ref 1–1.03)
UROBILINOGEN UR STRIP-ACNC: 0.2 EU/DL (ref 0–1)
WBC #/AREA URNS HPF: NORMAL /HPF

## 2024-05-20 PROCEDURE — 99283 EMERGENCY DEPT VISIT LOW MDM: CPT

## 2024-05-20 PROCEDURE — 51702 INSERT TEMP BLADDER CATH: CPT

## 2024-05-20 PROCEDURE — 81001 URINALYSIS AUTO W/SCOPE: CPT

## 2024-05-20 ASSESSMENT — PAIN DESCRIPTION - PAIN TYPE: TYPE: ACUTE PAIN

## 2024-05-20 ASSESSMENT — PAIN SCALES - GENERAL: PAINLEVEL_OUTOF10: 10

## 2024-05-20 ASSESSMENT — PAIN DESCRIPTION - ONSET: ONSET: ON-GOING

## 2024-05-20 ASSESSMENT — LIFESTYLE VARIABLES
HOW MANY STANDARD DRINKS CONTAINING ALCOHOL DO YOU HAVE ON A TYPICAL DAY: PATIENT DOES NOT DRINK
HOW OFTEN DO YOU HAVE A DRINK CONTAINING ALCOHOL: NEVER

## 2024-05-20 ASSESSMENT — PAIN - FUNCTIONAL ASSESSMENT: PAIN_FUNCTIONAL_ASSESSMENT: 0-10

## 2024-05-20 ASSESSMENT — PAIN DESCRIPTION - FREQUENCY: FREQUENCY: CONTINUOUS

## 2024-05-21 NOTE — ED PROVIDER NOTES
Independent LORI Visit.     [facility]  Department of Emergency Medicine   ED  Encounter Note  Admit Date/RoomTime: 2024  9:20 PM  ED Room:     NAME: Lam Summers  : 1950  MRN: [id number]     Chief Complaint:  Urinary Retention (Had hip sx Friday, has been unable to urinate since Friday. )    History of Present Illness       Lam Summers is a 73 y.o. old male who presents to the emergency department by private vehicle, for urinary retention, which occured 2 day(s) prior to arrival.  Since onset the symptoms have been gradually worsening and moderate in severity.  Symptoms are associated with abdominal pain.  Lam Summers has a history of no prior or recurrent UTI's.   He denies any fever, chills, nausea, or vomiting.  Patient had hip surgery under general anesthesia due to complexity of surgery on Friday.  After the surgery he was having some difficulty urinating but he was able to urinate the amount that they required for discharge.  The following day which was Saturday he was urinating several times throughout the day but did not seem to be passing as much urine as usual.   yesterday, he was only urinating small streams and little bit of dribbling.  Today he is only had dribbling.    ROS   Pertinent positives and negatives are stated within HPI, all other systems reviewed and are negative.    Past Medical History:  has a past medical history of Blood disorder, Fracture, hip - Right, History of blood transfusion, Hypertension, and Lyme disease.    Surgical History:  has a past surgical history that includes other surgical history; cyst removal; Colonoscopy; hip surgery (Right, 2023); and Total hip arthroplasty (Right, 2024).    Social History:  reports that he quit smoking about 9 years ago. His smoking use included cigarettes. He has never used smokeless tobacco. He reports current alcohol use. He reports that he does not use drugs.    Family

## 2024-05-28 LAB — SURGICAL PATHOLOGY REPORT: NORMAL

## (undated) DEVICE — LIQUIBAND RAPID ADHESIVE 36/CS 0.8ML: Brand: MEDLINE

## (undated) DEVICE — GLOVE ORTHO 8   MSG9480

## (undated) DEVICE — DRAPE,REIN 53X77,STERILE: Brand: MEDLINE

## (undated) DEVICE — 2108 SERIES SAGITTAL BLADE, OFFSET (20.0 X 0.89 X 80.0MM)

## (undated) DEVICE — PAD PERINL POST FOAM DISPOSABLE FOR AMSCO SURG TBL

## (undated) DEVICE — 3M™ COBAN™ NL STERILE NON-LATEX SELF-ADHERENT WRAP, 2084S, 4 IN X 5 YD (10 CM X 4,5 M), 18 ROLLS/CASE: Brand: 3M™ COBAN™

## (undated) DEVICE — GUIDEWIRE ORTH L400MM DIA3.2MM FOR TFN

## (undated) DEVICE — BLADE,STAINLESS-STEEL,10,STRL,DISPOSABLE: Brand: MEDLINE

## (undated) DEVICE — SUTURE STRATAFIX SPRL SZ 2 0 L14IN ABSRB UD MH L36MM 1 2 CIR SXMP2B401

## (undated) DEVICE — TOTAL HIP PK

## (undated) DEVICE — PEEL-AWAY HOOD: Brand: FLYTE, SURGICOOL

## (undated) DEVICE — 4-PORT MANIFOLD: Brand: NEPTUNE 2

## (undated) DEVICE — TAPE ADH W3INXL10YD WHT COT WVN BK POWERFUL RUB BASE HIGHLY

## (undated) DEVICE — 3M™ STERI-DRAPE™ U-DRAPE 1015: Brand: STERI-DRAPE™

## (undated) DEVICE — SKN PREP SPNG STKS PVP PNT STR: Brand: MEDLINE INDUSTRIES, INC.

## (undated) DEVICE — NEEDLE,22GX1.5",REG,BEVEL: Brand: MEDLINE

## (undated) DEVICE — DOUBLE BASIN SET: Brand: MEDLINE INDUSTRIES, INC.

## (undated) DEVICE — GLOVE ORANGE PI 7 1/2   MSG9075

## (undated) DEVICE — GLOVE SURG SZ 65 THK91MIL LTX FREE SYN POLYISOPRENE

## (undated) DEVICE — SYRINGE MED 30ML STD CLR PLAS LUERLOCK TIP N CTRL DISP

## (undated) DEVICE — GLOVE SURG SZ 65 L12IN FNGR THK79MIL GRN LTX FREE

## (undated) DEVICE — COVER HNDL LT DISP

## (undated) DEVICE — SOLUTION IRRIG 1000ML 0.9% SOD CHL USP POUR PLAS BTL

## (undated) DEVICE — 1000 S-DRAPE TOWEL DRAPE 10/BX: Brand: STERI-DRAPE™

## (undated) DEVICE — SPONGE LAP W18XL18IN WHT COT 4 PLY FLD STRUNG RADPQ DISP ST 2 PER PACK

## (undated) DEVICE — SOLUTION IRRIG 3000ML 0.9% SOD CHL USP UROMATIC PLAS CONT

## (undated) DEVICE — Device: Brand: COVER, PERINEAL POST, 12 PK

## (undated) DEVICE — APPLICATOR MEDICATED 26 CC SOLUTION HI LT ORNG CHLORAPREP

## (undated) DEVICE — DRESSING HYDROFIBER AQUACEL AG ADVANTAGE 3.5X6 IN

## (undated) DEVICE — Device

## (undated) DEVICE — PIN BNE FIX TEMP L140MM DIA4MM MAKO

## (undated) DEVICE — 3M™ IOBAN™ 2 ANTIMICROBIAL INCISE DRAPE 6640EZ: Brand: IOBAN™ 2

## (undated) DEVICE — STRIP,CLOSURE,WOUND,MEDI-STRIP,1/2X4: Brand: MEDLINE

## (undated) DEVICE — GAUZE,SPONGE,4"X4",8PLY,STRL,LF,10/TRAY: Brand: MEDLINE

## (undated) DEVICE — PADDING CAST W6INXL4YD COT LO LINTING WYTEX

## (undated) DEVICE — BLADE ES L6IN ELASTOMERIC COAT EXT DURABLE BEND UPTO 90DEG

## (undated) DEVICE — TUBING, SUCTION, 9/32" X 10', STRAIGHT: Brand: MEDLINE

## (undated) DEVICE — SUTURE STRATAFIX SYMMETRIC SZ 1 L18IN ABSRB VLT CT1 L36CM SXPP1A404

## (undated) DEVICE — NEEDLE HYPO 18GA L1.5IN PNK POLYPR HUB S STL REG BVL STR

## (undated) DEVICE — SYRINGE,CONTROL,LL,FINGER,GRIP: Brand: MEDLINE INDUSTRIES, INC.

## (undated) DEVICE — PACK PROCEDURE SURG GEN CUST

## (undated) DEVICE — BIT DRL L L266MM DIA16MM FEM FLX CANN QUIK CPL

## (undated) DEVICE — GOWN,SIRUS,FABRNF,XL,20/CS: Brand: MEDLINE

## (undated) DEVICE — DRAPE,TOP,102X53,STERILE: Brand: MEDLINE

## (undated) DEVICE — KIT DRP FOR RIO ROBOTIC ARM ASST SYS

## (undated) DEVICE — WIPES SKIN CLOTH READYPREP 9 X 10.5 IN 2% CHLORHEX GLUCONATE CHG PREOP

## (undated) DEVICE — ELECTRODE PT RET AD L9FT HI MOIST COND ADH HYDRGEL CORDED

## (undated) DEVICE — HANDPIECE SET WITH COAXIAL HIGH FLOW TIP AND SUCTION TUBE: Brand: INTERPULSE

## (undated) DEVICE — Device: Brand: PROTECTORS, LEG SPAR BALL JOINT, 12/PR

## (undated) DEVICE — DRESSING FOAM ADH POLYUR W/ SIL WND SHT 4IN LEN 4IN W

## (undated) DEVICE — GLOVE SURG SZ 8 CRM LTX FREE POLYISOPRENE POLYMER BEAD ANTI

## (undated) DEVICE — KIT SURG W7XL11IN 2 PKT UNTREATED NA

## (undated) DEVICE — KIT TRK HIP PROC VIZADISC

## (undated) DEVICE — 3M™ IOBAN™ 2 ANTIMICROBIAL INCISE DRAPE 6651EZ: Brand: IOBAN™ 2

## (undated) DEVICE — GLOVE SURG SZ 6 THK91MIL LTX FREE SYN POLYISOPRENE ANTI

## (undated) DEVICE — TOWEL,OR,DSP,ST,BLUE,STD,6/PK,12PK/CS: Brand: MEDLINE

## (undated) DEVICE — BANDAGE,GAUZE,BULKEE II,4.5"X4.1YD,STRL: Brand: MEDLINE

## (undated) DEVICE — 3M™ IOBAN™ 2 ANTIMICROBIAL INCISE DRAPE 6650EZ: Brand: IOBAN™ 2

## (undated) DEVICE — BIT DRL L500MM DIA6X9MM CANN STP L QUIK CPL FOR DH DC TFN

## (undated) DEVICE — BIT DRL L330MM DIA4.2MM CALIB L100MM ST 3 FLUT QUIK CPL FOR

## (undated) DEVICE — DRAPE ISOLATN PT ST W/POCKET

## (undated) DEVICE — 3M™ TEGADERM™ TRANSPARENT FILM DRESSING FRAME STYLE, 1626W, 4 IN X 4-3/4 IN (10 CM X 12 CM), 50/CT 4CT/CASE: Brand: 3M™ TEGADERM™

## (undated) DEVICE — DRESSING HYDROFIBER AQUACEL AG ADVANTAGE 3.5X10 IN

## (undated) DEVICE — C-ARM: Brand: UNBRANDED

## (undated) DEVICE — MARKER RAD 3.5MM HEX CKPT STEREOTAXIC IMAG LESION LOC FOR

## (undated) DEVICE — SUTURE STRATAFIX SYMMETRIC PDS + SZ 1 L18IN ABSRB VLT OS-6 SXPP1A201